# Patient Record
Sex: FEMALE | Race: WHITE | NOT HISPANIC OR LATINO | Employment: UNEMPLOYED | URBAN - METROPOLITAN AREA
[De-identification: names, ages, dates, MRNs, and addresses within clinical notes are randomized per-mention and may not be internally consistent; named-entity substitution may affect disease eponyms.]

---

## 2023-02-02 PROBLEM — R82.998 LEUKOCYTES IN URINE: Status: ACTIVE | Noted: 2023-02-02

## 2023-02-02 PROBLEM — R30.0 DYSURIA: Status: ACTIVE | Noted: 2023-02-02

## 2023-02-02 PROBLEM — R31.9 HEMATURIA: Status: ACTIVE | Noted: 2023-02-02

## 2023-02-02 PROBLEM — K63.5 COLON POLYP: Status: ACTIVE | Noted: 2023-02-02

## 2023-02-02 PROBLEM — R20.2 PARESTHESIAS: Status: ACTIVE | Noted: 2023-02-02

## 2023-02-02 PROBLEM — M76.62 ACHILLES TENDINITIS, LEFT LEG: Status: RESOLVED | Noted: 2023-02-02 | Resolved: 2023-02-02

## 2023-02-02 PROBLEM — L28.2 PRURITIC RASH: Status: ACTIVE | Noted: 2023-02-02

## 2023-02-02 PROBLEM — R10.2 ABDOMINAL PAIN, SUPRAPUBIC: Status: ACTIVE | Noted: 2023-02-02

## 2023-02-02 PROBLEM — J06.9 URI, ACUTE: Status: ACTIVE | Noted: 2023-02-02

## 2023-02-02 PROBLEM — K14.3 TONGUE PAPILLAE HYPERTROPHY: Status: ACTIVE | Noted: 2023-02-02

## 2023-02-02 PROBLEM — T81.40XA POST OP INFECTION: Status: ACTIVE | Noted: 2023-02-02

## 2023-02-02 PROBLEM — M72.2 PLANTAR FASCIITIS, BILATERAL: Status: ACTIVE | Noted: 2023-02-02

## 2023-02-02 PROBLEM — M76.62 ACHILLES TENDINITIS OF LEFT LOWER EXTREMITY: Status: ACTIVE | Noted: 2023-02-02

## 2023-02-02 PROBLEM — G56.03 BILATERAL CARPAL TUNNEL SYNDROME: Status: ACTIVE | Noted: 2023-02-02

## 2023-02-02 PROBLEM — R39.9 URINARY SYMPTOM OR SIGN: Status: ACTIVE | Noted: 2023-02-02

## 2023-02-02 PROBLEM — M79.643 HAND PAIN: Status: ACTIVE | Noted: 2023-02-02

## 2023-02-02 PROBLEM — R11.2 NAUSEA AND VOMITING: Status: ACTIVE | Noted: 2023-02-02

## 2023-02-02 PROBLEM — D25.9 UTERINE FIBROID: Status: ACTIVE | Noted: 2023-02-02

## 2023-02-02 PROBLEM — M76.62 ACHILLES BURSITIS OF LEFT LOWER EXTREMITY: Status: ACTIVE | Noted: 2023-02-02

## 2023-02-02 PROBLEM — M25.569 KNEE PAIN: Status: ACTIVE | Noted: 2023-02-02

## 2023-02-02 PROBLEM — G47.00 INSOMNIA: Status: ACTIVE | Noted: 2023-02-02

## 2023-02-02 PROBLEM — R10.2 ACUTE PAIN IN FEMALE PELVIS: Status: ACTIVE | Noted: 2023-02-02

## 2023-02-02 PROBLEM — B37.31 VAGINAL YEAST INFECTION: Status: ACTIVE | Noted: 2023-02-02

## 2023-02-02 PROBLEM — E11.9 INSULIN-REQUIRING OR DEPENDENT TYPE II DIABETES MELLITUS (MULTI): Status: ACTIVE | Noted: 2023-02-02

## 2023-02-02 PROBLEM — E78.5 HLD (HYPERLIPIDEMIA): Status: ACTIVE | Noted: 2023-02-02

## 2023-02-02 PROBLEM — D17.9 LIPOMA: Status: ACTIVE | Noted: 2023-02-02

## 2023-02-02 PROBLEM — E11.9 TYPE 2 DIABETES MELLITUS (MULTI): Status: RESOLVED | Noted: 2023-02-02 | Resolved: 2023-02-02

## 2023-02-02 PROBLEM — H66.93 ACUTE OTITIS MEDIA, BILATERAL: Status: ACTIVE | Noted: 2023-02-02

## 2023-02-02 PROBLEM — H60.93 BILATERAL OTITIS EXTERNA: Status: ACTIVE | Noted: 2023-02-02

## 2023-02-02 PROBLEM — R10.84 GENERALIZED ABDOMINAL PAIN: Status: ACTIVE | Noted: 2023-02-02

## 2023-02-02 PROBLEM — M54.9 BACK PAIN: Status: ACTIVE | Noted: 2023-02-02

## 2023-02-02 PROBLEM — B96.89 ACUTE BACTERIAL BRONCHITIS: Status: ACTIVE | Noted: 2023-02-02

## 2023-02-02 PROBLEM — F98.8 ADD (ATTENTION DEFICIT DISORDER): Status: ACTIVE | Noted: 2023-02-02

## 2023-02-02 PROBLEM — E66.01 SEVERE OBESITY (BMI 35.0-39.9) WITH COMORBIDITY (MULTI): Status: ACTIVE | Noted: 2023-02-02

## 2023-02-02 PROBLEM — R05.9 COUGH: Status: ACTIVE | Noted: 2023-02-02

## 2023-02-02 PROBLEM — K59.09 CHRONIC CONSTIPATION: Status: ACTIVE | Noted: 2023-02-02

## 2023-02-02 PROBLEM — F32.A DEPRESSION: Status: ACTIVE | Noted: 2023-02-02

## 2023-02-02 PROBLEM — J30.9 ALLERGIC RHINITIS: Status: ACTIVE | Noted: 2023-02-02

## 2023-02-02 PROBLEM — E11.9 TYPE 2 DIABETES MELLITUS (MULTI): Status: ACTIVE | Noted: 2023-02-02

## 2023-02-02 PROBLEM — R09.81 NASAL CONGESTION: Status: ACTIVE | Noted: 2023-02-02

## 2023-02-02 PROBLEM — L56.8: Status: ACTIVE | Noted: 2023-02-02

## 2023-02-02 PROBLEM — Z79.4 INSULIN-REQUIRING OR DEPENDENT TYPE II DIABETES MELLITUS (MULTI): Status: ACTIVE | Noted: 2023-02-02

## 2023-02-02 PROBLEM — K21.9 GERD WITHOUT ESOPHAGITIS: Status: ACTIVE | Noted: 2023-02-02

## 2023-02-02 PROBLEM — G89.18 POSTOPERATIVE PAIN: Status: ACTIVE | Noted: 2023-02-02

## 2023-02-02 PROBLEM — J20.8 ACUTE BACTERIAL BRONCHITIS: Status: ACTIVE | Noted: 2023-02-02

## 2023-02-02 PROBLEM — M79.672 LEFT FOOT PAIN: Status: ACTIVE | Noted: 2023-02-02

## 2023-02-02 RX ORDER — INSULIN GLARGINE 100 [IU]/ML
40 INJECTION, SOLUTION SUBCUTANEOUS EVERY MORNING
COMMUNITY
Start: 2022-06-29 | End: 2023-03-15 | Stop reason: SDUPTHER

## 2023-02-02 RX ORDER — ATORVASTATIN CALCIUM 20 MG/1
1 TABLET, FILM COATED ORAL NIGHTLY
COMMUNITY
Start: 2022-06-29 | End: 2023-03-15 | Stop reason: SDUPTHER

## 2023-02-02 RX ORDER — ALBUTEROL SULFATE 90 UG/1
1 AEROSOL, METERED RESPIRATORY (INHALATION) EVERY 4 HOURS PRN
COMMUNITY
End: 2023-03-15 | Stop reason: SDUPTHER

## 2023-02-02 RX ORDER — POLYETHYLENE GLYCOL 3350 17 G/17G
17 POWDER, FOR SOLUTION ORAL DAILY PRN
COMMUNITY
Start: 2022-10-03 | End: 2023-03-15 | Stop reason: SDUPTHER

## 2023-02-02 RX ORDER — TALC
1 POWDER (GRAM) TOPICAL NIGHTLY
COMMUNITY
Start: 2022-11-15 | End: 2023-03-15 | Stop reason: SDUPTHER

## 2023-02-02 RX ORDER — CLOTRIMAZOLE AND BETAMETHASONE DIPROPIONATE 10; .64 MG/G; MG/G
CREAM TOPICAL 2 TIMES DAILY
COMMUNITY
End: 2023-03-15 | Stop reason: SDUPTHER

## 2023-02-02 RX ORDER — BLOOD SUGAR DIAGNOSTIC
STRIP MISCELLANEOUS
COMMUNITY
Start: 2022-10-17 | End: 2023-03-15 | Stop reason: SDUPTHER

## 2023-02-02 RX ORDER — OMEPRAZOLE 40 MG/1
1 CAPSULE, DELAYED RELEASE ORAL DAILY
COMMUNITY
Start: 2019-06-10 | End: 2023-03-15 | Stop reason: SDUPTHER

## 2023-02-02 RX ORDER — FLUCONAZOLE 150 MG/1
150 TABLET ORAL ONCE
COMMUNITY
End: 2023-03-15 | Stop reason: SDUPTHER

## 2023-02-02 RX ORDER — LANCETS
EACH MISCELLANEOUS 3 TIMES DAILY
COMMUNITY
End: 2023-03-15 | Stop reason: SDUPTHER

## 2023-02-02 RX ORDER — BLOOD SUGAR DIAGNOSTIC
STRIP MISCELLANEOUS 3 TIMES DAILY
COMMUNITY
End: 2023-03-15 | Stop reason: SDUPTHER

## 2023-02-02 RX ORDER — BENZONATATE 100 MG/1
100 CAPSULE ORAL
COMMUNITY
End: 2023-03-15 | Stop reason: SDUPTHER

## 2023-02-02 RX ORDER — CEPHALEXIN 500 MG/1
500 CAPSULE ORAL 2 TIMES DAILY
COMMUNITY
End: 2023-08-29 | Stop reason: ALTCHOICE

## 2023-02-02 RX ORDER — HYDROCORTISONE 1 %
CREAM (GRAM) TOPICAL
COMMUNITY
Start: 2022-01-27 | End: 2024-03-07 | Stop reason: WASHOUT

## 2023-03-15 ENCOUNTER — OFFICE VISIT (OUTPATIENT)
Dept: PRIMARY CARE | Facility: CLINIC | Age: 38
End: 2023-03-15
Payer: MEDICARE

## 2023-03-15 VITALS
HEIGHT: 66 IN | HEART RATE: 95 BPM | DIASTOLIC BLOOD PRESSURE: 83 MMHG | TEMPERATURE: 97.1 F | WEIGHT: 247 LBS | SYSTOLIC BLOOD PRESSURE: 116 MMHG | BODY MASS INDEX: 39.7 KG/M2 | OXYGEN SATURATION: 98 %

## 2023-03-15 DIAGNOSIS — Z79.4 INSULIN-REQUIRING OR DEPENDENT TYPE II DIABETES MELLITUS (MULTI): Primary | ICD-10-CM

## 2023-03-15 DIAGNOSIS — E78.5 HYPERLIPIDEMIA, UNSPECIFIED HYPERLIPIDEMIA TYPE: ICD-10-CM

## 2023-03-15 DIAGNOSIS — E66.01 OBESITY, CLASS III, BMI 40-49.9 (MORBID OBESITY) (MULTI): ICD-10-CM

## 2023-03-15 DIAGNOSIS — E11.9 INSULIN-REQUIRING OR DEPENDENT TYPE II DIABETES MELLITUS (MULTI): Primary | ICD-10-CM

## 2023-03-15 DIAGNOSIS — R20.2 PARESTHESIAS: ICD-10-CM

## 2023-03-15 DIAGNOSIS — F90.8 ATTENTION DEFICIT HYPERACTIVITY DISORDER (ADHD), OTHER TYPE: ICD-10-CM

## 2023-03-15 DIAGNOSIS — K59.03 DRUG-INDUCED CONSTIPATION: ICD-10-CM

## 2023-03-15 DIAGNOSIS — K21.9 GERD WITHOUT ESOPHAGITIS: ICD-10-CM

## 2023-03-15 DIAGNOSIS — J45.909 ASTHMA, UNSPECIFIED ASTHMA SEVERITY, UNSPECIFIED WHETHER COMPLICATED, UNSPECIFIED WHETHER PERSISTENT (HHS-HCC): ICD-10-CM

## 2023-03-15 DIAGNOSIS — F32.A DEPRESSION, UNSPECIFIED DEPRESSION TYPE: ICD-10-CM

## 2023-03-15 PROBLEM — G56.03 BILATERAL CARPAL TUNNEL SYNDROME: Status: RESOLVED | Noted: 2023-02-02 | Resolved: 2023-03-15

## 2023-03-15 PROBLEM — E66.813 OBESITY, CLASS III, BMI 40-49.9 (MORBID OBESITY): Status: ACTIVE | Noted: 2023-03-15

## 2023-03-15 PROCEDURE — 99215 OFFICE O/P EST HI 40 MIN: CPT | Performed by: INTERNAL MEDICINE

## 2023-03-15 PROCEDURE — 3079F DIAST BP 80-89 MM HG: CPT | Performed by: INTERNAL MEDICINE

## 2023-03-15 PROCEDURE — 3074F SYST BP LT 130 MM HG: CPT | Performed by: INTERNAL MEDICINE

## 2023-03-15 RX ORDER — TALC
3 POWDER (GRAM) TOPICAL NIGHTLY
Qty: 90 TABLET | Refills: 3 | Status: SHIPPED | OUTPATIENT
Start: 2023-03-15 | End: 2023-08-29 | Stop reason: ALTCHOICE

## 2023-03-15 RX ORDER — INSULIN GLARGINE 100 [IU]/ML
40 INJECTION, SOLUTION SUBCUTANEOUS EVERY MORNING
Qty: 3 ML | Refills: 11 | Status: SHIPPED | OUTPATIENT
Start: 2023-03-15 | End: 2023-08-01

## 2023-03-15 RX ORDER — ATORVASTATIN CALCIUM 20 MG/1
20 TABLET, FILM COATED ORAL NIGHTLY
Qty: 90 TABLET | Refills: 3 | Status: SHIPPED | OUTPATIENT
Start: 2023-03-15 | End: 2023-08-29 | Stop reason: SDDI

## 2023-03-15 RX ORDER — POLYETHYLENE GLYCOL 3350 17 G/17G
17 POWDER, FOR SOLUTION ORAL DAILY PRN
Qty: 540 G | Refills: 11 | Status: SHIPPED | OUTPATIENT
Start: 2023-03-15 | End: 2023-08-29 | Stop reason: ALTCHOICE

## 2023-03-15 RX ORDER — ACETAMINOPHEN 500 MG
1000 TABLET ORAL EVERY 6 HOURS
COMMUNITY

## 2023-03-15 RX ORDER — FLUCONAZOLE 150 MG/1
150 TABLET ORAL ONCE
Qty: 1 TABLET | Refills: 0 | Status: SHIPPED | OUTPATIENT
Start: 2023-03-15 | End: 2023-03-15

## 2023-03-15 RX ORDER — MICONAZOLE NITRATE 2 %
CREAM WITH APPLICATOR VAGINAL
Qty: 45 G | Refills: 3 | Status: SHIPPED | OUTPATIENT
Start: 2023-03-15 | End: 2023-08-29 | Stop reason: ALTCHOICE

## 2023-03-15 RX ORDER — LANCETS 33 GAUGE
EACH MISCELLANEOUS
COMMUNITY
Start: 2022-11-03 | End: 2024-03-07 | Stop reason: SDUPTHER

## 2023-03-15 RX ORDER — OMEPRAZOLE 40 MG/1
40 CAPSULE, DELAYED RELEASE ORAL
Qty: 90 CAPSULE | Refills: 3 | Status: SHIPPED | OUTPATIENT
Start: 2023-03-15 | End: 2024-03-14

## 2023-03-15 RX ORDER — LANCETS
1 EACH MISCELLANEOUS 3 TIMES DAILY
Qty: 90 EACH | Refills: 3 | Status: SHIPPED | OUTPATIENT
Start: 2023-03-15

## 2023-03-15 RX ORDER — MICONAZOLE NITRATE 2 %
CREAM WITH APPLICATOR VAGINAL
COMMUNITY
Start: 2023-02-21 | End: 2023-03-15 | Stop reason: SDUPTHER

## 2023-03-15 RX ORDER — ALBUTEROL SULFATE 90 UG/1
1 AEROSOL, METERED RESPIRATORY (INHALATION) EVERY 4 HOURS PRN
Qty: 18 G | Refills: 3 | Status: SHIPPED | OUTPATIENT
Start: 2023-03-15 | End: 2023-08-29 | Stop reason: SDDI

## 2023-03-15 RX ORDER — KETOROLAC TROMETHAMINE 10 MG/1
TABLET, FILM COATED ORAL
COMMUNITY
Start: 2022-10-27 | End: 2023-11-02 | Stop reason: ALTCHOICE

## 2023-03-15 RX ORDER — BENZONATATE 100 MG/1
100 CAPSULE ORAL 3 TIMES DAILY PRN
Qty: 20 CAPSULE | Refills: 3 | Status: SHIPPED | OUTPATIENT
Start: 2023-03-15 | End: 2023-08-29 | Stop reason: ALTCHOICE

## 2023-03-15 RX ORDER — CLOTRIMAZOLE AND BETAMETHASONE DIPROPIONATE 10; .64 MG/G; MG/G
CREAM TOPICAL 2 TIMES DAILY
Qty: 60 G | Refills: 3 | Status: SHIPPED | OUTPATIENT
Start: 2023-03-15 | End: 2023-11-02 | Stop reason: SDUPTHER

## 2023-03-15 RX ORDER — BLOOD SUGAR DIAGNOSTIC
STRIP MISCELLANEOUS
Qty: 100 STRIP | Refills: 11 | Status: SHIPPED | OUTPATIENT
Start: 2023-03-15 | End: 2024-03-07 | Stop reason: SDUPTHER

## 2023-03-15 SDOH — ECONOMIC STABILITY: FOOD INSECURITY: WITHIN THE PAST 12 MONTHS, YOU WORRIED THAT YOUR FOOD WOULD RUN OUT BEFORE YOU GOT MONEY TO BUY MORE.: SOMETIMES TRUE

## 2023-03-15 SDOH — ECONOMIC STABILITY: FOOD INSECURITY: WITHIN THE PAST 12 MONTHS, THE FOOD YOU BOUGHT JUST DIDN'T LAST AND YOU DIDN'T HAVE MONEY TO GET MORE.: SOMETIMES TRUE

## 2023-03-15 ASSESSMENT — ENCOUNTER SYMPTOMS
DEPRESSION: 0
LOSS OF SENSATION IN FEET: 1
OCCASIONAL FEELINGS OF UNSTEADINESS: 1

## 2023-03-15 ASSESSMENT — LIFESTYLE VARIABLES: HOW MANY STANDARD DRINKS CONTAINING ALCOHOL DO YOU HAVE ON A TYPICAL DAY: PATIENT DOES NOT DRINK

## 2023-03-15 ASSESSMENT — PATIENT HEALTH QUESTIONNAIRE - PHQ9
1. LITTLE INTEREST OR PLEASURE IN DOING THINGS: NOT AT ALL
10. IF YOU CHECKED OFF ANY PROBLEMS, HOW DIFFICULT HAVE THESE PROBLEMS MADE IT FOR YOU TO DO YOUR WORK, TAKE CARE OF THINGS AT HOME, OR GET ALONG WITH OTHER PEOPLE: NOT DIFFICULT AT ALL
SUM OF ALL RESPONSES TO PHQ9 QUESTIONS 1 & 2: 2
2. FEELING DOWN, DEPRESSED OR HOPELESS: MORE THAN HALF THE DAYS

## 2023-03-15 NOTE — PROGRESS NOTES
Chief Complaint/HPI:  DM type 2: patient takes Lantus and Januvia. Glucoses fluctuate, she is aware of this. Patient had surgery last fall, had hysterectomy,  has ovaries, she does get intermittent sharp pain    Hyperlipidemia: patient takes atorvastatin, last labs were checked in 11/2022     Patient feels tired at times, gets numbness in the hands and feet.  Hands get numb at times    Panic attack: patient gets shortness of breath when she has panic attacks      ROS otherwise negative aside from what was mentioned above in HPI.      Patient Active Problem List   Diagnosis    Abdominal pain, suprapubic    Achilles bursitis of left lower extremity    Achilles tendinitis of left lower extremity    Acute bacterial bronchitis    Acute otitis media, bilateral    Acute pain in female pelvis    ADD (attention deficit disorder)    Allergic rhinitis    Back pain    Bilateral otitis externa    Chronic constipation    Colon polyp    Cough    Depression    Dermatitis, photoallergic    Dysuria    Generalized abdominal pain    GERD without esophagitis    Hand pain    Hematuria    HLD (hyperlipidemia)    Insomnia    Insulin-requiring or dependent type II diabetes mellitus (CMS/HCC)    Knee pain    Left foot pain    Leukocytes in urine    Lipoma    Nasal congestion    Nausea and vomiting    Paresthesias    Plantar fasciitis, bilateral    Post op infection    Postoperative pain    Pruritic rash    Tongue papillae hypertrophy    Type 2 diabetes mellitus (CMS/HCC)    URI, acute    Urinary symptom or sign    Uterine fibroid    Vaginal yeast infection    Obesity, Class III, BMI 40-49.9 (morbid obesity) (CMS/HCC)         Past Medical History:   Diagnosis Date    Achilles tendinitis, left leg 02/02/2023    Encounter for screening for malignant neoplasm of colon     Encounter for colonoscopy in patient with family history of colon cancer    Personal history of other diseases of the digestive system     History of acute pancreatitis     Personal history of other endocrine, nutritional and metabolic disease     History of type 1 diabetes mellitus    Type 2 diabetes mellitus (CMS/[facility]) 02/02/2023     Past Surgical History:   Procedure Laterality Date    OTHER SURGICAL HISTORY  06/29/2022    No history of surgery     Social History     Social History Narrative    Not on file         ALLERGIES  Morphine and Wool      MEDICATIONS  Current Outpatient Medications on File Prior to Visit   Medication Sig Dispense Refill    cephalexin (Keflex) 500 mg capsule Take 1 capsule (500 mg) by mouth in the morning and 1 capsule (500 mg) before bedtime. After meals.      flash glucose sensor (FREESTYLE KAMALJIT 2 SENSOR [facility])       hydrocortisone 1 % cream Hydrocortisone 1 % External Cream   Quantity: 28  Refills: 0        Start : 27-Jan-2022   Active      OneTouch Delica Plus Lancet 33 gauge misc       SITagliptin phosphate (Januvia) 100 mg tablet Take 1 tablet (100 mg) by mouth once daily.      [DISCONTINUED] albuterol 90 mcg/actuation inhaler Inhale 1 puff every 4 hours if needed.      [DISCONTINUED] atorvastatin (Lipitor) 20 mg tablet Take 1 tablet (20 mg) by mouth once daily at bedtime.      [DISCONTINUED] benzonatate (Tessalon) 100 mg capsule Take 1 capsule (100 mg) by mouth. Do not crush or chew. 2-3 times daily as directed      [DISCONTINUED] blood sugar diagnostic (Accu-Chek Guide test strips) strip TEST 3 TIMES DAILY.      [DISCONTINUED] blood sugar diagnostic (Accu-Chek Guide test strips) strip in the morning, at noon, and at bedtime.      [DISCONTINUED] clotrimazole-betamethasone (Lotrisone) cream in the morning and at bedtime. Apply and rub in a thin film to affected areas      [DISCONTINUED] fluconazole (Diflucan) 150 mg tablet Take 1 tablet (150 mg) by mouth 1 time. May repeat in 1 week if still symptomatic      [DISCONTINUED] insulin glargine (Lantus) 100 unit/mL (3 mL) pen Inject 40 Units under the skin once daily in the morning.      [DISCONTINUED]  "lancets misc in the morning, at noon, and at bedtime. As directed      [DISCONTINUED] melatonin 3 mg tablet Take 1 tablet (3 mg) by mouth once daily at bedtime.      [DISCONTINUED] miconazole (Micotin) 2 % vaginal cream APPLY INTRAVAGINALLY AT BEDTIME NIGHTLY.      [DISCONTINUED] omeprazole (PriLOSEC) 40 mg DR capsule Take 1 capsule (40 mg) by mouth once daily.      [DISCONTINUED] polyethylene glycol (Glycolax) 17 gram/dose powder Take 17 g by mouth once daily as needed (constipation). In liquid      acetaminophen (Tylenol) 500 mg tablet Take 2 tablets (1,000 mg) by mouth in the morning and 2 tablets (1,000 mg) at noon and 2 tablets (1,000 mg) in the evening and 2 tablets (1,000 mg) before bedtime.      elderberry fruit 50 mg/5 mL syrup 1   once a day      ketorolac (Toradol) 10 mg tablet        No current facility-administered medications on file prior to visit.         PHYSICAL EXAM  /83 (BP Location: Right arm, Patient Position: Sitting, BP Cuff Size: Large adult)   Pulse 95   Temp 36.2 °C (97.1 °F)   Ht 1.664 m (5' 5.5\")   Wt 112 kg (247 lb)   SpO2 98%   BMI 40.48 kg/m²   Body mass index is 40.48 kg/m².  Gen: Alert, NAD, she is morbidly obese, wearing a mask, accompanied by her dog  HEENT:  PERRLA, EOMI, conjunctiva and sclera normal in appearance  Respiratory:  Lungs CTAB  Cardiovascular:  Heart RRR. No M/R/G, difficulty hearing, due to barking dog  Neuro:  Gross motor and sensory intact, subjective numbness of the hands is noted      ASSESSMENT/PLAN  Problem List Items Addressed This Visit          Nervous    Paresthesias    Current Assessment & Plan     Patient has numbness in the hands, check NCTs of the hands will be ordered, check routine labs also          Relevant Medications    albuterol 90 mcg/actuation inhaler    benzonatate (Tessalon) 100 mg capsule    clotrimazole-betamethasone (Lotrisone) cream    fluconazole (Diflucan) 150 mg tablet    miconazole (Micotin) 2 % vaginal cream    " omeprazole (PriLOSEC) 40 mg DR capsule       Digestive    GERD without esophagitis       Endocrine/Metabolic    Insulin-requiring or dependent type II diabetes mellitus (CMS/HCC) - Primary    Current Assessment & Plan     Glucoses do fluctuate, she takes Januvia now. Continue the insulin therapy, trial of Ozempic to see if this promotes weight loss and improved glucose control, stop the Januvia when starting Ozempic, recheck labs         Relevant Medications    albuterol 90 mcg/actuation inhaler    benzonatate (Tessalon) 100 mg capsule    blood sugar diagnostic (Accu-Chek Guide test strips) strip    clotrimazole-betamethasone (Lotrisone) cream    fluconazole (Diflucan) 150 mg tablet    insulin glargine (Lantus) 100 unit/mL (3 mL) pen    lancets misc    miconazole (Micotin) 2 % vaginal cream    omeprazole (PriLOSEC) 40 mg DR capsule    semaglutide (Ozempic) injection 0.25 mg (Start on 3/15/2023  6:45 PM)    Obesity, Class III, BMI 40-49.9 (morbid obesity) (CMS/[facility])    Current Assessment & Plan     See if Ozempic causes any weight loss, will stop the Januvia         Relevant Medications    albuterol 90 mcg/actuation inhaler    benzonatate (Tessalon) 100 mg capsule    clotrimazole-betamethasone (Lotrisone) cream    fluconazole (Diflucan) 150 mg tablet    miconazole (Micotin) 2 % vaginal cream    omeprazole (PriLOSEC) 40 mg DR capsule    semaglutide (Ozempic) injection 0.25 mg (Start on 3/15/2023  6:45 PM)       Other    ADD (attention deficit disorder)    Relevant Medications    albuterol 90 mcg/actuation inhaler    benzonatate (Tessalon) 100 mg capsule    clotrimazole-betamethasone (Lotrisone) cream    fluconazole (Diflucan) 150 mg tablet    miconazole (Micotin) 2 % vaginal cream    omeprazole (PriLOSEC) 40 mg DR capsule    Depression    Relevant Medications    albuterol 90 mcg/actuation inhaler    benzonatate (Tessalon) 100 mg capsule    clotrimazole-betamethasone (Lotrisone) cream    fluconazole (Diflucan) 150 mg  tablet    melatonin 3 mg tablet    miconazole (Micotin) 2 % vaginal cream    omeprazole (PriLOSEC) 40 mg DR capsule    HLD (hyperlipidemia)    Current Assessment & Plan     Patient takes atorvastatin, check labs         Relevant Medications    albuterol 90 mcg/actuation inhaler    atorvastatin (Lipitor) 20 mg tablet    benzonatate (Tessalon) 100 mg capsule    clotrimazole-betamethasone (Lotrisone) cream    fluconazole (Diflucan) 150 mg tablet    miconazole (Micotin) 2 % vaginal cream    omeprazole (PriLOSEC) 40 mg DR capsule     Other Visit Diagnoses       Asthma, unspecified asthma severity, unspecified whether complicated, unspecified whether persistent        Relevant Medications    albuterol 90 mcg/actuation inhaler    benzonatate (Tessalon) 100 mg capsule    clotrimazole-betamethasone (Lotrisone) cream    fluconazole (Diflucan) 150 mg tablet    miconazole (Micotin) 2 % vaginal cream    omeprazole (PriLOSEC) 40 mg DR capsule    Drug-induced constipation        Relevant Medications    miconazole (Micotin) 2 % vaginal cream    omeprazole (PriLOSEC) 40 mg DR capsule    polyethylene glycol (Glycolax) 17 gram/dose powder          Wheezing: encourage the patient to quit smoking, meds refilled  Hany Sabillon MD

## 2023-03-15 NOTE — ASSESSMENT & PLAN NOTE
Glucoses do fluctuate, she takes Januvia now. Continue the insulin therapy, trial of Ozempic to see if this promotes weight loss and improved glucose control, stop the Januvia when starting Ozempic, recheck labs

## 2023-03-15 NOTE — ASSESSMENT & PLAN NOTE
Patient has numbness in the hands, check NCTs of the hands will be ordered, check routine labs also

## 2023-03-21 ENCOUNTER — TELEPHONE (OUTPATIENT)
Dept: PRIMARY CARE | Facility: CLINIC | Age: 38
End: 2023-03-21
Payer: MEDICAID

## 2023-03-21 NOTE — TELEPHONE ENCOUNTER
Prior authorization request received via fax for [employer] LANCETS    Form given to: PLACED IN LEAD MA'S BOX ON 3/21.

## 2023-03-21 NOTE — TELEPHONE ENCOUNTER
Pt is needing a letter for work accommodations to say that she can take a break daily in order to check her sugar to make sure it isn't high or low.

## 2023-05-13 ENCOUNTER — HOSPITAL ENCOUNTER (OUTPATIENT)
Dept: DATA CONVERSION | Facility: HOSPITAL | Age: 38
End: 2023-05-13

## 2023-06-14 ENCOUNTER — TELEPHONE (OUTPATIENT)
Dept: PRIMARY CARE | Facility: CLINIC | Age: 38
End: 2023-06-14
Payer: MEDICAID

## 2023-06-14 DIAGNOSIS — B37.31 VAGINAL YEAST INFECTION: Primary | ICD-10-CM

## 2023-06-14 RX ORDER — FLUCONAZOLE 150 MG/1
150 TABLET ORAL SEE ADMIN INSTRUCTIONS
Qty: 2 TABLET | Refills: 1 | Status: SHIPPED | OUTPATIENT
Start: 2023-06-14 | End: 2023-07-24 | Stop reason: SDUPTHER

## 2023-07-17 ENCOUNTER — APPOINTMENT (OUTPATIENT)
Dept: PRIMARY CARE | Facility: CLINIC | Age: 38
End: 2023-07-17
Payer: MEDICARE

## 2023-07-17 DIAGNOSIS — Z79.4 INSULIN-REQUIRING OR DEPENDENT TYPE II DIABETES MELLITUS (MULTI): Primary | ICD-10-CM

## 2023-07-17 DIAGNOSIS — E11.9 INSULIN-REQUIRING OR DEPENDENT TYPE II DIABETES MELLITUS (MULTI): Primary | ICD-10-CM

## 2023-07-17 RX ORDER — SITAGLIPTIN 100 MG/1
TABLET, FILM COATED ORAL
Qty: 90 TABLET | Refills: 3 | Status: SHIPPED | OUTPATIENT
Start: 2023-07-17

## 2023-07-17 NOTE — TELEPHONE ENCOUNTER
Pt called to check on her appt. (Today 07/17/23 @ 3:00pm)  Her phone number changed and didn't notify us so her appt was canceled.  Pt was to be check for her A1C.  Dr Sabillon next available appt in August.  She wanted to make sure she is ok to wait that long for her A1C appt. Thanks:)

## 2023-07-20 ENCOUNTER — CLINICAL SUPPORT (OUTPATIENT)
Dept: PRIMARY CARE | Facility: CLINIC | Age: 38
End: 2023-07-20
Payer: MEDICARE

## 2023-07-20 DIAGNOSIS — B37.31 VAGINAL YEAST INFECTION: ICD-10-CM

## 2023-07-20 DIAGNOSIS — N39.0 ACUTE UTI: ICD-10-CM

## 2023-07-20 DIAGNOSIS — R82.998 LEUKOCYTES IN URINE: Primary | ICD-10-CM

## 2023-07-20 LAB
POC APPEARANCE, URINE: ABNORMAL
POC BILIRUBIN, URINE: ABNORMAL
POC BLOOD, URINE: ABNORMAL
POC COLOR, URINE: ABNORMAL
POC GLUCOSE, URINE: ABNORMAL MG/DL
POC KETONES, URINE: ABNORMAL MG/DL
POC LEUKOCYTES, URINE: ABNORMAL
POC NITRITE,URINE: POSITIVE
POC PH, URINE: 6 PH
POC PROTEIN, URINE: ABNORMAL MG/DL
POC SPECIFIC GRAVITY, URINE: >=1.03
POC UROBILINOGEN, URINE: 1 EU/DL

## 2023-07-20 PROCEDURE — 87186 SC STD MICRODIL/AGAR DIL: CPT

## 2023-07-20 PROCEDURE — 87086 URINE CULTURE/COLONY COUNT: CPT

## 2023-07-20 PROCEDURE — 81002 URINALYSIS NONAUTO W/O SCOPE: CPT | Performed by: INTERNAL MEDICINE

## 2023-07-20 PROCEDURE — 99211 OFF/OP EST MAY X REQ PHY/QHP: CPT | Performed by: INTERNAL MEDICINE

## 2023-07-20 PROCEDURE — 87077 CULTURE AEROBIC IDENTIFY: CPT

## 2023-07-20 NOTE — PROGRESS NOTES
Patient came in stating she is a diabetic and gets yeast infections all the time. I had her leave a urine sample just in case and it does look like it is positive for infection. Please send in antibiotic and something for yeast she uses [facility] in [place]

## 2023-07-23 LAB — URINE CULTURE: ABNORMAL

## 2023-07-24 RX ORDER — SULFAMETHOXAZOLE AND TRIMETHOPRIM 800; 160 MG/1; MG/1
1 TABLET ORAL 2 TIMES DAILY
Qty: 14 TABLET | Refills: 0 | Status: SHIPPED | OUTPATIENT
Start: 2023-07-24 | End: 2023-07-31

## 2023-07-24 RX ORDER — FLUCONAZOLE 150 MG/1
150 TABLET ORAL SEE ADMIN INSTRUCTIONS
Qty: 2 TABLET | Refills: 1 | Status: SHIPPED | OUTPATIENT
Start: 2023-07-24 | End: 2023-08-29 | Stop reason: ALTCHOICE

## 2023-07-31 DIAGNOSIS — Z79.4 INSULIN-REQUIRING OR DEPENDENT TYPE II DIABETES MELLITUS (MULTI): ICD-10-CM

## 2023-07-31 DIAGNOSIS — E11.9 INSULIN-REQUIRING OR DEPENDENT TYPE II DIABETES MELLITUS (MULTI): ICD-10-CM

## 2023-08-01 DIAGNOSIS — E11.9 INSULIN-REQUIRING OR DEPENDENT TYPE II DIABETES MELLITUS (MULTI): ICD-10-CM

## 2023-08-01 DIAGNOSIS — Z79.4 INSULIN-REQUIRING OR DEPENDENT TYPE II DIABETES MELLITUS (MULTI): ICD-10-CM

## 2023-08-01 PROBLEM — K76.0 FATTY LIVER: Status: ACTIVE | Noted: 2021-01-05

## 2023-08-01 PROBLEM — H52.4 MYOPIA WITH ASTIGMATISM AND PRESBYOPIA, BILATERAL: Status: ACTIVE | Noted: 2021-03-09

## 2023-08-01 PROBLEM — B37.9 YEAST INFECTION: Status: ACTIVE | Noted: 2021-12-25

## 2023-08-01 PROBLEM — K21.9 GERD (GASTROESOPHAGEAL REFLUX DISEASE): Status: ACTIVE | Noted: 2020-10-23

## 2023-08-01 PROBLEM — G43.909 MIGRAINES: Status: ACTIVE | Noted: 2021-01-05

## 2023-08-01 PROBLEM — R19.7 DIARRHEA: Status: ACTIVE | Noted: 2021-12-25

## 2023-08-01 PROBLEM — Z86.19 HISTORY OF HELICOBACTER PYLORI INFECTION: Status: ACTIVE | Noted: 2021-01-05

## 2023-08-01 PROBLEM — K64.8 INTERNAL HEMORRHOIDS: Status: ACTIVE | Noted: 2021-01-05

## 2023-08-01 PROBLEM — M79.7 FIBROMYALGIA: Status: ACTIVE | Noted: 2021-01-18

## 2023-08-01 PROBLEM — R21 RASH: Status: ACTIVE | Noted: 2023-08-01

## 2023-08-01 PROBLEM — Z86.19 HISTORY OF HERPES GENITALIS: Status: ACTIVE | Noted: 2021-01-05

## 2023-08-01 PROBLEM — E78.49 OTHER HYPERLIPIDEMIA: Status: ACTIVE | Noted: 2022-03-09

## 2023-08-01 PROBLEM — N93.9: Status: ACTIVE | Noted: 2021-11-02

## 2023-08-01 PROBLEM — L23.9 ALLERGIC DERMATITIS: Status: ACTIVE | Noted: 2023-08-01

## 2023-08-01 PROBLEM — F17.200 TOBACCO USE DISORDER: Status: ACTIVE | Noted: 2021-11-02

## 2023-08-01 PROBLEM — H52.13 MYOPIA WITH ASTIGMATISM AND PRESBYOPIA, BILATERAL: Status: ACTIVE | Noted: 2021-03-09

## 2023-08-01 PROBLEM — H52.203 MYOPIA WITH ASTIGMATISM AND PRESBYOPIA, BILATERAL: Status: ACTIVE | Noted: 2021-03-09

## 2023-08-01 PROBLEM — H66.92 ACUTE OTITIS MEDIA, LEFT: Status: ACTIVE | Noted: 2017-07-03

## 2023-08-01 PROBLEM — D25.2: Status: ACTIVE | Noted: 2021-11-02

## 2023-08-01 PROBLEM — L03.90 CELLULITIS: Status: ACTIVE | Noted: 2023-08-01

## 2023-08-01 PROBLEM — F43.10 PTSD (POST-TRAUMATIC STRESS DISORDER): Status: ACTIVE | Noted: 2021-01-05

## 2023-08-01 PROBLEM — G47.30 SLEEP APNEA: Status: ACTIVE | Noted: 2021-11-02

## 2023-08-01 PROBLEM — D25.2 FIBROIDS, SUBSEROUS: Status: ACTIVE | Noted: 2021-11-02
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
## 2023-08-01 PROBLEM — D21.9 MYOMA: Status: ACTIVE | Noted: 2021-05-13

## 2023-08-01 RX ORDER — INSULIN GLARGINE 100 [IU]/ML
INJECTION, SOLUTION SUBCUTANEOUS
Qty: 21 ML | Refills: 3 | Status: SHIPPED | OUTPATIENT
Start: 2023-08-01 | End: 2023-08-01 | Stop reason: SDUPTHER

## 2023-08-01 RX ORDER — INSULIN GLARGINE 100 [IU]/ML
INJECTION, SOLUTION SUBCUTANEOUS
Qty: 21 ML | Refills: 3 | Status: SHIPPED | OUTPATIENT
Start: 2023-08-01 | End: 2024-01-13 | Stop reason: SDUPTHER

## 2023-08-01 RX ORDER — ASPIRIN 325 MG
1 TABLET, DELAYED RELEASE (ENTERIC COATED) ORAL EVERY EVENING
COMMUNITY
Start: 2023-07-25 | End: 2024-03-07 | Stop reason: WASHOUT

## 2023-08-01 RX ORDER — AZELASTINE 1 MG/ML
2 SPRAY, METERED NASAL
COMMUNITY
Start: 2021-12-14

## 2023-08-01 RX ORDER — PANTOPRAZOLE SODIUM 40 MG/1
1 TABLET, DELAYED RELEASE ORAL 2 TIMES DAILY
COMMUNITY
Start: 2020-12-09 | End: 2023-11-02 | Stop reason: ALTCHOICE

## 2023-08-01 RX ORDER — PREDNISONE 50 MG/1
50 TABLET ORAL DAILY
COMMUNITY
Start: 2023-07-28 | End: 2023-11-02 | Stop reason: ALTCHOICE

## 2023-08-01 RX ORDER — DIPHENHYDRAMINE HCL 25 MG
25 TABLET ORAL EVERY 6 HOURS PRN
COMMUNITY
Start: 2023-07-26

## 2023-08-01 RX ORDER — IBUPROFEN 800 MG/1
800 TABLET ORAL
COMMUNITY
Start: 2021-08-20

## 2023-08-01 RX ORDER — PREDNISONE 20 MG/1
20 TABLET ORAL DAILY
COMMUNITY
Start: 2023-07-26 | End: 2023-11-02 | Stop reason: ALTCHOICE

## 2023-08-01 RX ORDER — FAMOTIDINE 20 MG/1
1 TABLET, FILM COATED ORAL DAILY
COMMUNITY
Start: 2023-07-26 | End: 2023-11-02 | Stop reason: ALTCHOICE

## 2023-08-02 ENCOUNTER — TELEPHONE (OUTPATIENT)
Dept: PRIMARY CARE | Facility: CLINIC | Age: 38
End: 2023-08-02
Payer: MEDICAID

## 2023-08-02 DIAGNOSIS — E78.5 HYPERLIPIDEMIA, UNSPECIFIED HYPERLIPIDEMIA TYPE: ICD-10-CM

## 2023-08-02 DIAGNOSIS — Z79.4 INSULIN-REQUIRING OR DEPENDENT TYPE II DIABETES MELLITUS (MULTI): Primary | ICD-10-CM

## 2023-08-02 DIAGNOSIS — E11.9 INSULIN-REQUIRING OR DEPENDENT TYPE II DIABETES MELLITUS (MULTI): Primary | ICD-10-CM

## 2023-08-02 NOTE — TELEPHONE ENCOUNTER
Pt called in and is requesting a note be written stating that the pt is able to work while having diabetes and that it is under control. Pt has a potential job offer and will be needing the note. Pt would like a call once this is completed.

## 2023-08-29 ENCOUNTER — OFFICE VISIT (OUTPATIENT)
Dept: PRIMARY CARE | Facility: CLINIC | Age: 38
End: 2023-08-29
Payer: MEDICARE

## 2023-08-29 VITALS
RESPIRATION RATE: 16 BRPM | SYSTOLIC BLOOD PRESSURE: 103 MMHG | TEMPERATURE: 97.7 F | HEIGHT: 66 IN | DIASTOLIC BLOOD PRESSURE: 66 MMHG | BODY MASS INDEX: 38.09 KG/M2 | OXYGEN SATURATION: 97 % | HEART RATE: 107 BPM | WEIGHT: 237 LBS

## 2023-08-29 DIAGNOSIS — E66.01 OBESITY, CLASS III, BMI 40-49.9 (MORBID OBESITY) (MULTI): ICD-10-CM

## 2023-08-29 DIAGNOSIS — E11.9 INSULIN-REQUIRING OR DEPENDENT TYPE II DIABETES MELLITUS (MULTI): ICD-10-CM

## 2023-08-29 DIAGNOSIS — Z91.148 NONCOMPLIANCE WITH MEDICATION REGIMEN: Primary | ICD-10-CM

## 2023-08-29 DIAGNOSIS — Z79.4 INSULIN-REQUIRING OR DEPENDENT TYPE II DIABETES MELLITUS (MULTI): ICD-10-CM

## 2023-08-29 DIAGNOSIS — K76.0 FATTY LIVER: ICD-10-CM

## 2023-08-29 DIAGNOSIS — F17.200 TOBACCO DEPENDENCE SYNDROME: ICD-10-CM

## 2023-08-29 DIAGNOSIS — L02.91 ABSCESS: ICD-10-CM

## 2023-08-29 DIAGNOSIS — E78.49 OTHER HYPERLIPIDEMIA: ICD-10-CM

## 2023-08-29 PROBLEM — S46.219A RUPTURE OF BICEPS TENDON: Status: ACTIVE | Noted: 2023-08-29

## 2023-08-29 PROBLEM — G47.19 EXCESSIVE DAYTIME SLEEPINESS: Status: ACTIVE | Noted: 2023-08-29

## 2023-08-29 PROBLEM — N39.0 ACUTE LOWER URINARY TRACT INFECTION: Status: ACTIVE | Noted: 2023-08-29

## 2023-08-29 PROBLEM — R06.00 DYSPNEA: Status: ACTIVE | Noted: 2023-08-29

## 2023-08-29 PROBLEM — D72.829 LEUKOCYTOSIS: Status: ACTIVE | Noted: 2023-08-29

## 2023-08-29 PROBLEM — F60.3 BORDERLINE PERSONALITY DISORDER (MULTI): Status: ACTIVE | Noted: 2023-08-29

## 2023-08-29 PROBLEM — F33.9 RECURRENT MAJOR DEPRESSIVE DISORDER (CMS-HCC): Status: ACTIVE | Noted: 2023-08-29

## 2023-08-29 PROBLEM — K62.5 RECTAL HEMORRHAGE: Status: ACTIVE | Noted: 2023-08-29

## 2023-08-29 PROBLEM — R53.82 CHRONIC FATIGUE: Status: ACTIVE | Noted: 2023-08-29

## 2023-08-29 PROCEDURE — 99214 OFFICE O/P EST MOD 30 MIN: CPT | Performed by: INTERNAL MEDICINE

## 2023-08-29 PROCEDURE — 3074F SYST BP LT 130 MM HG: CPT | Performed by: INTERNAL MEDICINE

## 2023-08-29 PROCEDURE — 3078F DIAST BP <80 MM HG: CPT | Performed by: INTERNAL MEDICINE

## 2023-08-29 RX ORDER — CHLORHEXIDINE GLUCONATE 40 MG/ML
SOLUTION TOPICAL DAILY PRN
Qty: 473 ML | Refills: 1 | Status: SHIPPED | OUTPATIENT
Start: 2023-08-29 | End: 2024-03-07 | Stop reason: WASHOUT

## 2023-08-29 RX ORDER — SULFAMETHOXAZOLE AND TRIMETHOPRIM 800; 160 MG/1; MG/1
1 TABLET ORAL 2 TIMES DAILY
Qty: 28 TABLET | Refills: 0 | Status: SHIPPED | OUTPATIENT
Start: 2023-08-29 | End: 2023-09-12

## 2023-08-29 RX ORDER — MUPIROCIN 20 MG/G
OINTMENT TOPICAL 3 TIMES DAILY
Qty: 22 G | Refills: 0 | Status: SHIPPED | OUTPATIENT
Start: 2023-08-29 | End: 2023-09-08

## 2023-08-29 RX ORDER — CAMPHOR/EUCALYPTUS/MENTHOL
2 LIQUID (ML) MISCELLANEOUS 4 TIMES DAILY PRN
Qty: 250 EACH | Refills: 3 | Status: SHIPPED | OUTPATIENT
Start: 2023-08-29 | End: 2024-03-07 | Stop reason: WASHOUT

## 2023-08-29 ASSESSMENT — ENCOUNTER SYMPTOMS
FATIGUE: 1
SORE THROAT: 0
ANOREXIA: 0
FEVER: 0
EYE PAIN: 1
SHORTNESS OF BREATH: 0
VOMITING: 0
RHINORRHEA: 1
DIARRHEA: 0
COUGH: 1
NAIL CHANGES: 0

## 2023-08-29 NOTE — PROGRESS NOTES
"Chief Complaint/HPI:    DM type 2: patient has taken insulin since 12/2021. 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 Patient has not had blood work for DM checked recently, patient takes insulin and Januvia,  she does not take Ozempic. Patient states that she has financial restraints     history of MRSA: patient had lesions  on the buttocks recently, she was treated with antibiotic therapy and prednisone. Redness has resolved, still has \"black circles\" on the back. Patient has a lesion on the right back area and a \"pimple\" on the chin     hyperlipidemia: patient apparently is not taking atorvastatin now     smoker: patient still smokes about 1 ppd she admits, she is trying to cut down.      ADHD/ depression/ PTSD: patient has been homeless in the past she states, patient prefers to avoid medication therapy, she prefers talking to someone, she currently talks to her friends who have similar issues     uterine fibroid: patient had surgery completed, hysterectomy was done     ROS otherwise negative aside from what was mentioned above in HPI.      Patient Active Problem List   Diagnosis    Abdominal pain, suprapubic    Achilles bursitis of left lower extremity    Achilles tendinitis of left lower extremity    Acute bacterial bronchitis    Acute otitis media, bilateral    Acute pain in female pelvis    ADD (attention deficit disorder)    Allergic rhinitis    Back pain    Bilateral otitis externa    Chronic constipation    Colon polyp    Cough    Depression    Dermatitis, photoallergic    Dysuria    Generalized abdominal pain    GERD without esophagitis    Hand pain    Hematuria    HLD (hyperlipidemia)    Insomnia    " Insulin-requiring or dependent type II diabetes mellitus (CMS/HCC)    Knee pain    Left foot pain    Leukocytes in urine    Lipoma    Nasal congestion    Nausea and vomiting    Paresthesias    Plantar fasciitis, bilateral    Post op infection    Postoperative pain    Pruritic rash    Tongue papillae hypertrophy    Type 2 diabetes mellitus (CMS/HCC)    URI, acute    Urinary symptom or sign    Uterine fibroid    Vaginal yeast infection    Obesity, Class III, BMI 40-49.9 (morbid obesity) (CMS/HCC)    Abnormal uterine bleeding due to subserous leiomyoma of uterus    Rash    Allergic dermatitis    GERD (gastroesophageal reflux disease)    Other hyperlipidemia    Acute otitis media, left    Yeast infection    Type 2 diabetes mellitus without retinopathy (CMS/HCC)    Tobacco use disorder    Sleep apnea    PTSD (post-traumatic stress disorder)    Myopia with astigmatism and presbyopia, bilateral    Migraines    Legal intervention    Internal hemorrhoids    History of herpes genitalis    History of Helicobacter pylori infection    Myoma    Fibromyalgia    Fatty liver    Diarrhea    Cellulitis    Fibroids, subserous    Acute lower urinary tract infection    Borderline personality disorder (CMS/HCC)    Chronic fatigue    Dyspnea    Excessive daytime sleepiness    Leukocytosis    Noncompliance with medication regimen    Rectal hemorrhage    Recurrent major depressive disorder (CMS/HCC)    Rupture of biceps tendon    Tobacco dependence syndrome    Abscess         Past Medical History:   Diagnosis Date    Achilles tendinitis, left leg 02/02/2023    Encounter for screening for malignant neoplasm of colon     Encounter for colonoscopy in patient with family history of colon cancer    Personal history of other diseases of the digestive system     History of acute pancreatitis    Personal history of other endocrine, nutritional and metabolic disease     History of type 1 diabetes mellitus    Type 2 diabetes mellitus (CMS/HCC)  02/02/2023     Past Surgical History:   Procedure Laterality Date    OTHER SURGICAL HISTORY  06/29/2022    No history of surgery     Social History     Social History Narrative    Not on file         ALLERGIES  Hydrocodone-acetaminophen, Morphine, Oxycodone-acetaminophen, and Wool      MEDICATIONS  Current Outpatient Medications on File Prior to Visit   Medication Sig Dispense Refill    blood sugar diagnostic (Accu-Chek Guide test strips) strip TEST 3 TIMES DAILY. 100 strip 11    clotrimazole (Lotrimin) 1 % vaginal cream Insert 1 applicator into the vagina once daily in the evening.      insulin glargine (Basaglar KwikPen U-100 Insulin) 100 unit/mL (3 mL) pen INJECT 40 UNITS UNDER THE SKIN ONCE DAILY IN THE MORNING. 21 mL 3    lancets misc 1 each  in the morning and 1 each in the evening and 1 each before bedtime. As directed. 90 each 3    omeprazole (PriLOSEC) 40 mg DR capsule Take 1 capsule (40 mg) by mouth once daily in the morning. Take before meals. 90 capsule 3    OneTouch Delica Plus Lancet 33 gauge misc       SITagliptin phosphate (Januvia) 100 mg tablet TAKE 1 TABLET BY MOUTH DAILY 90 tablet 3    [DISCONTINUED] cephalexin (Keflex) 500 mg capsule Take 1 capsule (500 mg) by mouth 2 times a day. After meals      acetaminophen (Tylenol) 500 mg tablet Take 2 tablets (1,000 mg) by mouth every 6 hours.      azelastine (Astelin) 137 mcg (0.1 %) nasal spray Administer 2 sprays into affected nostril(s) once daily.      clotrimazole-betamethasone (Lotrisone) cream Apply topically 2 times a day. Apply and rub in a thin film to affected areas 60 g 3    diphenhydrAMINE (Benadryl Allergy) 25 mg tablet Take 1 tablet (25 mg) by mouth every 6 hours if needed for itching or allergies.      elderberry fruit 50 mg/5 mL syrup 1   once a day      flash glucose sensor (FREESTYLE KAMALJIT 2 SENSOR [facility])       hydrocortisone 1 % cream Hydrocortisone 1 % External Cream   Quantity: 28  Refills: 0        Start : 27-Jan-2022   Active       ibuprofen 800 mg tablet Take 1 tablet (800 mg) by mouth.      ketorolac (Toradol) 10 mg tablet       pantoprazole (ProtoNix) 40 mg EC tablet Take 1 tablet (40 mg) by mouth 2 times a day.      Pepcid 20 mg tablet Take 1 tablet (20 mg) by mouth once daily.      predniSONE (Deltasone) 20 mg tablet Take 1 tablet (20 mg) by mouth once daily.      predniSONE (Deltasone) 50 mg tablet Take 1 tablet (50 mg) by mouth once daily.      [DISCONTINUED] albuterol 90 mcg/actuation inhaler Inhale 1 puff every 4 hours if needed for shortness of breath. (Patient not taking: Reported on 8/29/2023) 18 g 3    [DISCONTINUED] atorvastatin (Lipitor) 20 mg tablet Take 1 tablet (20 mg) by mouth once daily at bedtime. (Patient not taking: Reported on 8/29/2023) 90 tablet 3    [DISCONTINUED] benzonatate (Tessalon) 100 mg capsule Take 1 capsule (100 mg) by mouth 3 times a day as needed for cough. Do not crush or chew. 2-3 times daily as directed (Patient not taking: Reported on 8/29/2023) 20 capsule 3    [DISCONTINUED] fluconazole (Diflucan) 150 mg tablet Take 1 tablet (150 mg) by mouth see administration instructions. May repeat x 1 if symptoms persist (Patient not taking: Reported on 8/29/2023) 2 tablet 1    [DISCONTINUED] melatonin 3 mg tablet Take 1 tablet (3 mg) by mouth once daily at bedtime. (Patient not taking: Reported on 8/29/2023) 90 tablet 3    [DISCONTINUED] miconazole (Micotin) 2 % vaginal cream APPLY INTRAVAGINALLY AT BEDTIME NIGHTLY.  Strength: 2 % (Patient not taking: Reported on 8/29/2023) 45 g 3    [DISCONTINUED] polyethylene glycol (Glycolax) 17 gram/dose powder Take 17 g by mouth once daily as needed (constipation). In liquid (Patient not taking: Reported on 8/29/2023) 540 g 11     Current Facility-Administered Medications on File Prior to Visit   Medication Dose Route Frequency Provider Last Rate Last Admin    semaglutide (Ozempic) injection 0.25 mg  0.25 mg subcutaneous Once Hany Sabillon MD             PHYSICAL  "EXAM  /66 (BP Location: Right arm, Patient Position: Sitting, BP Cuff Size: Large adult)   Pulse 107   Temp 36.5 °C (97.7 °F)   Resp 16   Ht 1.676 m (5' 6\")   Wt 108 kg (237 lb)   SpO2 97%   BMI 38.25 kg/m²   Body mass index is 38.25 kg/m².  Constitutional   General appearance: Abnormal.  well developed, well nourished, morbidly obese , speech is rapid, A and O x 3  Eyes   Inspection of eyes: Sclera and conjunctiva were normal.   Ears, Nose, Mouth, and Throat   Ears: Auricles: Normal.   Pulmonary   Respiratory assessment: No respiratory distress, normal respiratory rhythm and effort.    Auscultation of Lungs: Clear bilateral breath sounds.   Cardiovascular   Auscultation of heart: Apical pulse normal, heart rate and rhythm normal, normal S1 and S2, no murmurs and no pericardial rub.    Exam for edema: No peripheral edema.   Skin   Examination of the skin for lesions: Abnormal.  a well demarcated raised lesion over the left chin, submental region, a 2nd lesion is present over the right lateral abdomen, it appears to be swelled, it is tender. The lesions are erythematous.   Neurologic   Cranial nerves: Nerves 2-12 were intact, no focal neuro defects.   Psychiatric   Orientation: Oriented to person, place, and time.    Mood and affect: Normal.     ASSESSMENT/PLAN  Problem List Items Addressed This Visit       Insulin-requiring or dependent type II diabetes mellitus (CMS/HCC)    Current Assessment & Plan     Check labs, glucose readings may have an effect on recurrence of abscesses         Relevant Orders    CBC and Auto Differential    Comprehensive metabolic panel    Hemoglobin A1c    Albumin, urine, random    Obesity, Class III, BMI 40-49.9 (morbid obesity) (CMS/HCC)    Relevant Orders    CBC and Auto Differential    Comprehensive metabolic panel    Other hyperlipidemia    Current Assessment & Plan     Check labs, patient stopped atorvastatin         Relevant Orders    CBC and Auto Differential    Lipid " panel    Comprehensive metabolic panel    Fatty liver    Current Assessment & Plan     Check labs         Relevant Orders    CBC and Auto Differential    Lipid panel    Comprehensive metabolic panel    Noncompliance with medication regimen - Primary    Relevant Orders    CBC and Auto Differential    Comprehensive metabolic panel    Tobacco dependence syndrome    Relevant Orders    CBC and Auto Differential    Comprehensive metabolic panel    Abscess    Current Assessment & Plan     Concerned about MRSA abscess, patient denies pregnancy, post hysterectomy. Will treat with Bactrim DS, trial of Bactroban under the nails to prevent MRSA transmission.         Relevant Medications    sulfamethoxazole-trimethoprim (Bactrim DS) 800-160 mg tablet    mupirocin (Bactroban) 2 % ointment    chlorhexidine (Hibiclens) 4 % external liquid    disposable gloves misc    Other Relevant Orders    CBC and Auto Differential    Comprehensive metabolic panel    Referral to Infectious Disease     Referral to ID for an assessment of recurrent abscesses     Follow up in 3 months    Hany Sabillon MD

## 2023-08-29 NOTE — ASSESSMENT & PLAN NOTE
Concerned about MRSA abscess, patient denies pregnancy, post hysterectomy. Will treat with Bactrim DS, trial of Bactroban under the nails to prevent MRSA transmission.

## 2023-08-30 ENCOUNTER — TELEPHONE (OUTPATIENT)
Dept: PRIMARY CARE | Facility: CLINIC | Age: 38
End: 2023-08-30
Payer: MEDICAID

## 2023-08-30 NOTE — TELEPHONE ENCOUNTER
Pt called to say that she started to take the Bactrum and woke up last night with a huge, itchy rash on her back.  She did go to the Emergency Room where they gave her some medication to help.  She has called scheduling for an Infectious Disease doctor but not able to get into till November.  She has spoken to another Infectious Disease doctor's office but waiting for there answer on if they can see her.  Pt said that she would like to know if we are able to squeeze her in with a  Infectious Disease doctor because she feels her quality of life isn't good.  Is there anything that we can do?

## 2023-10-11 ENCOUNTER — TELEPHONE (OUTPATIENT)
Dept: GASTROENTEROLOGY | Facility: CLINIC | Age: 38
End: 2023-10-11
Payer: MEDICAID

## 2023-10-11 NOTE — TELEPHONE ENCOUNTER
Patient was scheduled at 1:15 on a hospital coverage day. This is a scheduling error since I start at 1:45 and already have 3 new patients scheduled that afternoon. Please reschedule patient

## 2023-10-23 ENCOUNTER — APPOINTMENT (OUTPATIENT)
Dept: GASTROENTEROLOGY | Facility: CLINIC | Age: 38
End: 2023-10-23
Payer: MEDICARE

## 2023-10-24 ENCOUNTER — APPOINTMENT (OUTPATIENT)
Dept: RADIOLOGY | Facility: HOSPITAL | Age: 38
End: 2023-10-24
Payer: MEDICARE

## 2023-10-24 ENCOUNTER — HOSPITAL ENCOUNTER (EMERGENCY)
Facility: HOSPITAL | Age: 38
Discharge: HOME | End: 2023-10-24
Attending: EMERGENCY MEDICINE
Payer: MEDICARE

## 2023-10-24 VITALS
HEART RATE: 90 BPM | BODY MASS INDEX: 38.99 KG/M2 | WEIGHT: 234 LBS | OXYGEN SATURATION: 96 % | HEIGHT: 65 IN | TEMPERATURE: 98.4 F | SYSTOLIC BLOOD PRESSURE: 115 MMHG | RESPIRATION RATE: 20 BRPM | DIASTOLIC BLOOD PRESSURE: 88 MMHG

## 2023-10-24 DIAGNOSIS — J06.9 VIRAL URI WITH COUGH: ICD-10-CM

## 2023-10-24 LAB
FLUAV RNA RESP QL NAA+PROBE: NOT DETECTED
FLUBV RNA RESP QL NAA+PROBE: NOT DETECTED
SARS-COV-2 RNA RESP QL NAA+PROBE: NOT DETECTED

## 2023-10-24 PROCEDURE — 2500000002 HC RX 250 W HCPCS SELF ADMINISTERED DRUGS (ALT 637 FOR MEDICARE OP, ALT 636 FOR OP/ED): Performed by: EMERGENCY MEDICINE

## 2023-10-24 PROCEDURE — 94640 AIRWAY INHALATION TREATMENT: CPT

## 2023-10-24 PROCEDURE — 71045 X-RAY EXAM CHEST 1 VIEW: CPT | Mod: FOREIGN READ | Performed by: RADIOLOGY

## 2023-10-24 PROCEDURE — 99283 EMERGENCY DEPT VISIT LOW MDM: CPT | Mod: 25 | Performed by: EMERGENCY MEDICINE

## 2023-10-24 PROCEDURE — 71045 X-RAY EXAM CHEST 1 VIEW: CPT | Mod: FY

## 2023-10-24 PROCEDURE — 87636 SARSCOV2 & INF A&B AMP PRB: CPT

## 2023-10-24 RX ORDER — AZITHROMYCIN 250 MG/1
250 TABLET, FILM COATED ORAL DAILY
Qty: 6 TABLET | Refills: 0 | Status: SHIPPED | OUTPATIENT
Start: 2023-10-24 | End: 2023-10-29

## 2023-10-24 RX ORDER — ALBUTEROL SULFATE 90 UG/1
1-2 AEROSOL, METERED RESPIRATORY (INHALATION) EVERY 6 HOURS PRN
Qty: 18 G | Refills: 0 | Status: SHIPPED | OUTPATIENT
Start: 2023-10-24 | End: 2024-06-10 | Stop reason: WASHOUT

## 2023-10-24 RX ORDER — IPRATROPIUM BROMIDE AND ALBUTEROL SULFATE 2.5; .5 MG/3ML; MG/3ML
3 SOLUTION RESPIRATORY (INHALATION) ONCE
Status: COMPLETED | OUTPATIENT
Start: 2023-10-24 | End: 2023-10-24

## 2023-10-24 RX ORDER — BENZONATATE 100 MG/1
100 CAPSULE ORAL 3 TIMES DAILY PRN
Qty: 15 CAPSULE | Refills: 0 | Status: SHIPPED | OUTPATIENT
Start: 2023-10-24 | End: 2023-10-29

## 2023-10-24 RX ADMIN — IPRATROPIUM BROMIDE AND ALBUTEROL SULFATE 3 ML: 2.5; .5 SOLUTION RESPIRATORY (INHALATION) at 01:37

## 2023-10-24 ASSESSMENT — PAIN DESCRIPTION - LOCATION: LOCATION: RIB CAGE

## 2023-10-24 ASSESSMENT — COLUMBIA-SUICIDE SEVERITY RATING SCALE - C-SSRS
2. HAVE YOU ACTUALLY HAD ANY THOUGHTS OF KILLING YOURSELF?: NO
6. HAVE YOU EVER DONE ANYTHING, STARTED TO DO ANYTHING, OR PREPARED TO DO ANYTHING TO END YOUR LIFE?: NO
1. IN THE PAST MONTH, HAVE YOU WISHED YOU WERE DEAD OR WISHED YOU COULD GO TO SLEEP AND NOT WAKE UP?: NO

## 2023-10-24 ASSESSMENT — PAIN SCALES - GENERAL: PAINLEVEL_OUTOF10: 8

## 2023-10-24 ASSESSMENT — PAIN - FUNCTIONAL ASSESSMENT: PAIN_FUNCTIONAL_ASSESSMENT: 0-10

## 2023-10-24 NOTE — ED PROVIDER NOTES
HPI   Chief Complaint   Patient presents with    Shortness of Breath     Sick for 1 week, OTC medications not helping.  Severe rib pain when coughing, cannot control bladder.       HPI:  The patient is a 38-year-old female that is had cough and chest congestion for the last 4 to 5 days cough is occasionally productive of mucousy phlegm she also has feels like she cannot take a deep breath in she did have mild fever couple days ago but that since has gotten better she was really encouraged to come in by her significant other to be checked out because her symptoms have lasted so long.  She has some mild nausea has not had a bowel movement in a couple days but still has a good oral intake and appetite    Limitations to history: None  Independent Historians: Significant other at bedside  External Records Reviewed: EMR records  ------------------------------------------------------------------------------------------------------------------------------------------  ROS: a ten point review of systems was performed and negative except as per HPI.  ------------------------------------------------------------------------------------------------------------------------------------------  PMH / PSH: as per HPI, reviewed in EMR and discussed with the patient []  MEDS:  reviewed in EMR and discussed with the patient []  ALLERGIES: reviewed in EMR[]  SocH:  as per HPI, otherwise reviewed in EMR significant tobacco use  FH:  as per HPI, otherwise reviewed in EMR []  ------------------------------------------------------------------------------------------------------------------------------------------  Physical Exam:  VS: As documented in the triage note and EMR flowsheet from this visit was reviewed  General: Well appearing. No acute distress.   Eyes:  Extraocular movements grossly intact. No scleral icterus.   HEENT: Atraumatic. Normocephalic.    Neck: Supple. No gross masses  CV: RRR, audible S1/S2, 2+ symmetric peripheral  pulses  Resp: Good air exchange occasional rhonchi on auscultation bilaterally. No respiratory distress.  Non-labored respirations  GI: Soft, non-tender, non-distended, no rebound or gaurding  MSK: Symmetric muscle bulk. No gross step offs or deformities.  Skin: Warm, dry, no obvious rash.  Neuro: Speech fluent. Awake. Alert. Appropriate conversation.  Psych: Appropriate mood and affect for situation  ------------------------------------------------------------------------------------------------------------------------------------------  Hospital Course / Medical Decision Making:  Patient is well-appearing on my assessment she is unable unable to take a deep breath without inducing cough also family who is at the bedside states that she coughs so hard and so much that it induces vomiting.  She has not really been taking anything to help control her symptoms at home.  She had a flu and COVID swab done here as well as chest x-ray which were negative for any focal infiltrative process and swabs were negative as well.  She was trialed with a DuoNeb breathing treatment here did improve her bronchospasm because of her significant cigarette smoking we will cover her for atypical coverage with a course of azithromycin albuterol inhaler and Tessalon Perles which were sent over to her pharmacy at [facility] she was encouraged to continue supportive care at home and to follow-up with her regular primary care physician    Patient care discussed with:   Social Determinants affecting care:     Final diagnosis and disposition: Viral URI            Jocelyn Brantley MD                                      No data recorded                Patient History   Past Medical History:   Diagnosis Date    Achilles tendinitis, left leg 02/02/2023    Encounter for screening for malignant neoplasm of colon     Encounter for colonoscopy in patient with family history of colon cancer    Personal history of other diseases of the digestive  system     History of acute pancreatitis    Personal history of other endocrine, nutritional and metabolic disease     History of type 1 diabetes mellitus    Type 2 diabetes mellitus (CMS/HCC) 02/02/2023     Past Surgical History:   Procedure Laterality Date    OTHER SURGICAL HISTORY  06/29/2022    No history of surgery     Family History   Problem Relation Name Age of Onset    Heart block Mother      Hypertension Mother      Lung disease Mother      Cancer Mother      Other (seasonal allergies) Mother      Other (cardiac disorder) Father      Hypertension Father      Lung disease Father      Cancer Father      Other (seasonal allergies) Father      Colon cancer Brother       Social History     Tobacco Use    Smoking status: Every Day     Packs/day: 1     Types: Cigarettes    Smokeless tobacco: Never   Vaping Use    Vaping Use: Every day    Substances: Nicotine, Flavoring   Substance Use Topics    Alcohol use: Not Currently    Drug use: Never       Physical Exam   ED Triage Vitals [10/24/23 0020]   Temp Heart Rate Resp BP   36.7 °C (98.1 °F) 98 18 120/84      SpO2 Temp src Heart Rate Source Patient Position   97 % -- -- --      BP Location FiO2 (%)     -- --       Physical Exam    ED Course & MDM        Medical Decision Making      Procedure  Procedures     Jocelyn Brantley MD  10/24/23 0133

## 2023-10-27 PROBLEM — L53.9: Status: ACTIVE | Noted: 2023-10-27

## 2023-10-27 PROBLEM — G47.33 OBSTRUCTIVE SLEEP APNEA SYNDROME: Status: ACTIVE | Noted: 2019-10-11

## 2023-10-27 PROBLEM — F90.9 ATTENTION DEFICIT HYPERACTIVITY DISORDER: Status: ACTIVE | Noted: 2023-10-27

## 2023-10-27 PROBLEM — L25.9 CONTACT DERMATITIS: Status: ACTIVE | Noted: 2023-10-27

## 2023-10-27 PROBLEM — F32.A DEPRESSIVE DISORDER: Status: ACTIVE | Noted: 2023-10-27

## 2023-10-27 PROBLEM — N32.9 DISORDER OF BLADDER: Status: ACTIVE | Noted: 2023-10-27

## 2023-10-27 PROBLEM — F31.9 BIPOLAR DISORDER (MULTI): Status: ACTIVE | Noted: 2020-01-14

## 2023-10-27 PROBLEM — M79.603 PAIN IN UPPER LIMB: Status: ACTIVE | Noted: 2023-10-27

## 2023-10-27 PROBLEM — R51.9 HEADACHE: Status: ACTIVE | Noted: 2023-10-27

## 2023-10-27 PROBLEM — G43.909 MIGRAINE: Status: ACTIVE | Noted: 2023-10-27

## 2023-10-27 PROBLEM — L98.9 DISORDER OF SKIN: Status: ACTIVE | Noted: 2023-10-27

## 2023-10-27 PROBLEM — L02.91 ABSCESS OF SKIN: Status: ACTIVE | Noted: 2023-10-27

## 2023-10-27 PROBLEM — F41.9 ANXIETY: Status: ACTIVE | Noted: 2023-10-27

## 2023-10-27 PROBLEM — I10 HYPERTENSIVE DISORDER: Status: ACTIVE | Noted: 2023-10-27

## 2023-10-27 PROBLEM — K64.9 HEMORRHOIDS: Status: ACTIVE | Noted: 2023-10-27

## 2023-10-27 PROBLEM — R11.0 NAUSEA: Status: ACTIVE | Noted: 2023-10-27

## 2023-10-27 PROBLEM — M19.90 ARTHRITIS: Status: ACTIVE | Noted: 2023-10-27

## 2023-10-27 PROBLEM — H91.90 HEARING LOSS: Status: ACTIVE | Noted: 2023-10-27

## 2023-10-27 PROBLEM — M25.529 PAIN IN ELBOW: Status: ACTIVE | Noted: 2023-10-27

## 2023-10-27 PROBLEM — G47.10 HYPERSOMNOLENCE: Status: ACTIVE | Noted: 2023-10-27

## 2023-10-27 PROBLEM — J06.9 ACUTE UPPER RESPIRATORY INFECTION: Status: ACTIVE | Noted: 2023-10-27

## 2023-10-27 PROBLEM — R10.9 ABDOMINAL PAIN: Status: ACTIVE | Noted: 2019-10-11

## 2023-10-27 PROBLEM — J45.909 ASTHMA (HHS-HCC): Status: ACTIVE | Noted: 2023-10-27

## 2023-11-02 ENCOUNTER — LAB (OUTPATIENT)
Dept: LAB | Facility: LAB | Age: 38
End: 2023-11-02
Payer: MEDICARE

## 2023-11-02 ENCOUNTER — OFFICE VISIT (OUTPATIENT)
Dept: GASTROENTEROLOGY | Facility: CLINIC | Age: 38
End: 2023-11-02
Payer: MEDICARE

## 2023-11-02 VITALS
HEART RATE: 111 BPM | SYSTOLIC BLOOD PRESSURE: 125 MMHG | BODY MASS INDEX: 36.96 KG/M2 | DIASTOLIC BLOOD PRESSURE: 85 MMHG | HEIGHT: 66 IN | WEIGHT: 230 LBS

## 2023-11-02 DIAGNOSIS — R10.9 ABDOMINAL PAIN, UNSPECIFIED ABDOMINAL LOCATION: ICD-10-CM

## 2023-11-02 DIAGNOSIS — R19.4 CHANGE IN BOWEL HABIT: ICD-10-CM

## 2023-11-02 DIAGNOSIS — K21.9 GASTROESOPHAGEAL REFLUX DISEASE, UNSPECIFIED WHETHER ESOPHAGITIS PRESENT: ICD-10-CM

## 2023-11-02 DIAGNOSIS — K59.09 CHRONIC CONSTIPATION: Primary | ICD-10-CM

## 2023-11-02 PROBLEM — K64.9 HEMORRHOIDS: Status: ACTIVE | Noted: 2021-01-05

## 2023-11-02 LAB — TSH SERPL-ACNC: 1.7 MIU/L (ref 0.44–3.98)

## 2023-11-02 PROCEDURE — 83516 IMMUNOASSAY NONANTIBODY: CPT

## 2023-11-02 PROCEDURE — 36415 COLL VENOUS BLD VENIPUNCTURE: CPT

## 2023-11-02 PROCEDURE — 84443 ASSAY THYROID STIM HORMONE: CPT

## 2023-11-02 PROCEDURE — 3079F DIAST BP 80-89 MM HG: CPT | Performed by: INTERNAL MEDICINE

## 2023-11-02 PROCEDURE — 99204 OFFICE O/P NEW MOD 45 MIN: CPT | Performed by: INTERNAL MEDICINE

## 2023-11-02 PROCEDURE — 3074F SYST BP LT 130 MM HG: CPT | Performed by: INTERNAL MEDICINE

## 2023-11-02 NOTE — PATIENT INSTRUCTIONS
I have ordered labs to investigate your symptoms.      It is likely that medication side effects are playing a role in your symptoms.    You should also make sure that your diabetes is controlled and that you maintain a healthy weight. Obesity and uncontrolled diabetes will worsen most GI symptoms.      Constipation  I recommend that you use MiraLAX for constipation. This is available over the counter. I would recommend that you start taking 1 capful once daily.  If that is not effective after 4-5 days you can increase the dose.  You can increase the dose up to 2 capfuls twice daily if needed.  If the initial dose is causing your stools to be too loose or too many bowel movements then you can decrease the dose as much as you need to, but try to find a dose that you can take every day or every other day that is giving you soft and easy to pass stools.

## 2023-11-02 NOTE — PROGRESS NOTES
[facility]    ASSESSMENT and PLAN:       Drea Fajardo is a 38 y.o. female with a significant past medical history of HTN, DM2, MEKA, obesity, HLD, constipation, GERD, chronic pain, PTSD, depression/anxiety, bipolar, ADHD, and migraines who presents for consultation requested by the ER providers (Shelly Lechuga MD) for the evaluation of abdominal pain.       Constipation and abdominal pain and rectal bleeding  Long standing/chronic symptoms consistent with slow transit constipation and IBS-c. She reportedly has had some work up of this in the past, but details from that are unclear. Bleeding is likely related to hemorrhoids or prolapse and difficult to rule out some component of pelvic floor dysfunction. Her comorbidities and medication side effects are also likely playing a role. Will order labs to evaluate symptoms and will also check gastric emptying study given history of diabetes. Will request records from her previous endoscopies.  - labs ordered  - MiraLAX for constipation, discussed need for self titration      GERD  Longstanding symptom and reported history of H pylori that appears to have been treated in the past. She also reports multiple previous EGDs that have been normal. Will continue PPI at this time and plan to obtain records from last EGD. Seems unlikely that another EGD would be needed at this time.  - continue PPI      Colon cancer screening  Increased risk based on family history. Unclear previous endoscopy results and will request records from [facility] to determine appropriate interval for next colonoscopy  - records requested from [facility]      Follow up in 3 months.      Darius Tyler MD        Gastroenterology    [facility]    Clinical   [facility]        Subjective   HISTORY OF PRESENT ILLNESS:     Chief Complaint  New Patient Visit (ED 9/22/23- Abdominal pain, nausea,  "changes in bowel habits)    History Of Present Illness:    Drea Fajardo is a 38 y.o. female with a significant past medical history of HTN, DM2, MEKA, obesity (Body mass index is 37.12 kg/m².), HLD, constipation, GERD, chronic pain, PTSD, depression/anxiety, bipolar, ADHD, and migraines who presents for consultation requested by the ER providers (Shelly Lechuga MD) for the evaluation of abdominal pain.    She follows with Hany Sabillon MD as her PCP.       She was seen in the [facility] ER on 9/22/2023. Labs at that time showed a normal CBC, normal CMP, and elevated lipase (131, uln of 82). CT at that time was normal with no evidence of pancreatitis.    She was previously seen by GI (Dr. Figueroa) in 2019 for constipation, abdominal pain, GERD, rectal bleeding and rectal pain. At that time she reported that an EGD and colonoscopy were done in 2016 and showed colon polyps. She had recommended starting Omprazole and MiraLAX.    Problem list overview note written by gurpreet IYER states that she had also seen GI in 2020 and was treated for H pylori after EGD and colonoscopy.    No previous endoscopy reports in the  EMR.      Today she complains of chronic constipation, GERD, and abdominal pain. She says that these symptoms have been present for years and that \"nobody can figure them out\". She says that she most recently saw GI at [facility], but she does not want to follow up there again. She says that her last EGD and colonoscopy were in 2019 at [facility] and these were normal except for polyps. She believes that she was supposed to have another colonoscopy in 3-5 years.    She reports constipation with a BM every 3 days. Stool is usually hard with a lot of straining, but there are occasions when she will have some looser stool. This is associated with increased gas/bloating as well as diffuse abdominal pain/discomfort. Occasional she will see spots/streaks of bright red blood in her stool. She reports a " history of rectal prolapse that started after her last colonoscopy. She says that she most saw GI at [facility].    She also reports chronic heartburn that has been worse over the last 2 months. She has been taking Nexium or Prilosec (whichever is cheaper) once a day. She complains of heartburn with sour taste in her mouth. She says that she was previously treated for H pylori.    She has been losing weight intentionally with Ozempic.      Patient denies any N/V, dysphagia, odynophagia, diarrhea, hematemesis, or melena.      Review of systems:   Review of Systems   Constitutional:  Positive for fatigue. Negative for chills, fever and unexpected weight change.   HENT:  Negative for congestion, sneezing, sore throat, trouble swallowing and voice change.    Respiratory:  Negative for cough, shortness of breath and wheezing.    Cardiovascular:  Negative for chest pain, palpitations and leg swelling.   Gastrointestinal:         As detailed above.   Genitourinary:  Negative for dysuria and hematuria.   Musculoskeletal:  Positive for arthralgias. Negative for myalgias.   Skin:  Negative for pallor and rash.   Neurological:  Negative for dizziness, seizures, syncope, weakness, numbness and headaches.   Hematological:  Negative for adenopathy. Does not bruise/bleed easily.   Psychiatric/Behavioral:  Negative for confusion. The patient is not nervous/anxious.    All other systems reviewed and are negative.      I performed a complete 10 point review of systems and it is negative except as noted in HPI or above.      PAST HISTORIES:       Past Medical History:  She has a past medical history of Achilles tendinitis, left leg (02/02/2023), Encounter for screening for malignant neoplasm of colon, Personal history of other diseases of the digestive system, Personal history of other endocrine, nutritional and metabolic disease, and Type 2 diabetes mellitus (CMS/HCC) (02/02/2023).    Past Surgical History:  She has a past surgical  "history that includes Other surgical history (06/29/2022) and Colonoscopy.      Social History:  She reports that she has been smoking cigarettes. She has been smoking an average of 1 pack per day. She has never used smokeless tobacco. She reports that she does not currently use alcohol. She reports that she does not use drugs.    Family History:  She says that her brother had \"a rare form\" of colon cancer diagnosed at the age of 37. She denies any other family history of GI disease, including no family history of pancreatitis, Crohn's, gastroesophageal cancer, or ulcerative colitis.    Family History   Problem Relation Name Age of Onset    Heart block Mother      Hypertension Mother      Lung disease Mother      Cancer Mother      Other (seasonal allergies) Mother      Other (cardiac disorder) Father      Hypertension Father      Lung disease Father      Cancer Father      Other (seasonal allergies) Father      Colon cancer Brother          Allergies:  Hydrocodone-acetaminophen, Oxycodone-acetaminophen, Wool, and Morphine      Objective   OBJECTIVE:       Last Recorded Vitals:  Vitals:    11/02/23 1139   BP: 125/85   Pulse: (!) 111   Weight: 104 kg (230 lb)   Height: 1.676 m (5' 6\")     /85   Pulse (!) 111   Ht 1.676 m (5' 6\")   Wt 104 kg (230 lb)   BMI 37.12 kg/m²      Physical Exam:    Physical Exam  Vitals reviewed.   Constitutional:       General: She is not in acute distress.     Appearance: She is not ill-appearing.   HENT:      Head: Normocephalic and atraumatic.   Eyes:      General: No scleral icterus.  Cardiovascular:      Rate and Rhythm: Normal rate and regular rhythm.      Pulses: Normal pulses.      Heart sounds: Normal heart sounds. No murmur heard.  Pulmonary:      Effort: Pulmonary effort is normal. No respiratory distress.      Breath sounds: Normal breath sounds. No wheezing.   Abdominal:      General: Bowel sounds are normal.      Palpations: Abdomen is soft.      Tenderness: There is " abdominal tenderness (diffuse). There is no rebound.   Musculoskeletal:         General: No swelling or deformity.   Skin:     General: Skin is warm and dry.      Coloration: Skin is not jaundiced.      Findings: No rash.   Neurological:      General: No focal deficit present.      Mental Status: She is alert and oriented to person, place, and time.   Psychiatric:         Mood and Affect: Mood normal.         Behavior: Behavior normal.         Thought Content: Thought content normal.         Judgment: Judgment normal.         Home Medications:  Prior to Admission medications    Medication Sig Start Date End Date Taking? Authorizing Provider   acetaminophen (Tylenol) 500 mg tablet Take 2 tablets (1,000 mg) by mouth every 6 hours.    Historical Provider, MD   albuterol 90 mcg/actuation inhaler Inhale 1-2 puffs every 6 hours if needed for wheezing. 10/24/23 11/23/23  Jocelyn Brantley MD   azelastine (Astelin) 137 mcg (0.1 %) nasal spray Administer 2 sprays into affected nostril(s) once daily. 12/14/21   Historical Provider, MD   azithromycin (Zithromax Z-Romain) 250 mg tablet Take 1 tablet (250 mg) by mouth once daily for 5 days. 10/24/23 10/29/23  Jocelyn Brantley MD   benzonatate (Tessalon) 100 mg capsule Take 1 capsule (100 mg) by mouth 3 times a day as needed for cough for up to 5 days. Do not crush or chew. 10/24/23 10/29/23  Jocelyn Brantley MD   blood sugar diagnostic (Accu-Chek Guide test strips) strip TEST 3 TIMES DAILY. 3/15/23   Hany Sabillon MD   cephalexin (Keflex) 500 mg capsule TAKE 1 CAPSULE BY MOUTH FOUR TIMES A DAY 8/30/23 8/29/24  JUAN CARLOS Jauregui-CNP   cephalexin (Keflex) 500 mg capsule TAKE 1 CAPSULE BY MOUTH FOUR TIMES A DAY 7/28/23 7/27/24  JUAN CARLOS Jauregui-CNP   chlorhexidine (Hibiclens) 4 % external liquid Apply topically once daily as needed for wound care. Use to wash daily for 5 days to reduce colonization from wounds 8/29/23   Hany Sabillon MD   clotrimazole  (Lotrimin) 1 % vaginal cream Insert 1 applicator into the vagina once daily in the evening. 7/25/23   Historical Provider, MD   clotrimazole-betamethasone (Lotrisone) cream Apply topically 2 times a day. Apply and rub in a thin film to affected areas 3/15/23   Hany Sabillon MD   diphenhydrAMINE (Benadryl Allergy) 25 mg tablet Take 1 tablet (25 mg) by mouth every 6 hours if needed for itching or allergies. 7/26/23   Historical Provider, MD   disposable gloves misc 2 each 4 times a day as needed (abscess avoidance). 8/29/23   Hany Sabillon MD   elderberry fruit 50 mg/5 mL syrup 1   once a day    Historical Provider, MD   flash glucose sensor (FREESTYLE KAMALJIT 2 SENSOR [facility] [place]     Historical Provider, MD   hydrocortisone 1 % cream Hydrocortisone 1 % External Cream   Quantity: 28  Refills: 0        Start : 27-Jan-2022   Active 1/27/22   Historical Provider, MD   ibuprofen 800 mg tablet Take 1 tablet (800 mg) by mouth. 8/20/21   Historical Provider, MD   insulin glargine (Basaglar KwikPen U-100 Insulin) 100 unit/mL (3 mL) pen INJECT 40 UNITS UNDER THE SKIN ONCE DAILY IN THE MORNING. 8/1/23   Hany Sabillon MD   ketorolac (Toradol) 10 mg tablet  10/27/22   Historical Provider, MD   lancets misc 1 each  in the morning and 1 each in the evening and 1 each before bedtime. As directed. 3/15/23   Hany Sabillon MD   omeprazole (PriLOSEC) 40 mg DR capsule Take 1 capsule (40 mg) by mouth once daily in the morning. Take before meals. 3/15/23 3/14/24  Hany Sabillon MD   OneTouch Delica Plus Lancet 33 gauge misc  11/3/22   Historical Provider, MD   pantoprazole (ProtoNix) 40 mg EC tablet Take 1 tablet (40 mg) by mouth 2 times a day. 12/9/20   Historical Provider, MD   Pepcid 20 mg tablet Take 1 tablet (20 mg) by mouth once daily. 7/26/23   Historical Provider, MD   predniSONE (Deltasone) 20 mg tablet Take 1 tablet (20 mg) by mouth once daily. 7/26/23   Historical Provider, MD   predniSONE  "(Deltasone) 50 mg tablet Take 1 tablet (50 mg) by mouth once daily. 7/28/23   Historical Provider, MD   predniSONE (Deltasone) 50 mg tablet TAKE 1 TABLET BY MOUTH ONCE DAILY 7/28/23 7/27/24  Barbara Jansen, APRN-CNP   SITagliptin phosphate (Januvia) 100 mg tablet TAKE 1 TABLET BY MOUTH DAILY 7/17/23   Hany Sabillon MD         Relevant Results Recent labs reviewed in the EMR.  Lab Results   Component Value Date    HGB 13.8 09/22/2023    HGB 13.7 07/28/2023    HGB 14.7 11/15/2022    MCV 84 09/22/2023    MCV 83 07/28/2023    MCV 83 11/15/2022     09/22/2023     07/28/2023     11/15/2022       Lab Results   Component Value Date    IRON 82 03/26/2019       Lab Results   Component Value Date     (L) 09/22/2023    K 3.8 09/22/2023     09/22/2023    BUN 16 09/22/2023    CREATININE 0.69 09/22/2023       Lab Results   Component Value Date    BILITOT 0.6 09/22/2023     Lab Results   Component Value Date    ALT 10 09/22/2023    ALT 9 07/28/2023    ALT 19 11/15/2022    AST 10 09/22/2023    AST 9 07/28/2023    AST 13 11/15/2022    ALKPHOS 57 09/22/2023    ALKPHOS 55 07/28/2023    ALKPHOS 66 11/15/2022       No results found for: \"CRP\"    No results found for: \"CALPS\"    Radiology: Reviewed imaging reviewed in the EMR.  XR chest 1 view    Result Date: 10/24/2023  STUDY: Chest Radiograph;  10/24/2023 at 12:48 AM INDICATION: Shortness of  breath and cough. COMPARISON: None available. ACCESSION NUMBER(S): [id number] ORDERING CLINICIAN: BARBARA JANSEN TECHNIQUE:  Frontal chest was obtained at 00:48 hours. FINDINGS: CARDIOMEDIASTINAL SILHOUETTE: Cardiomediastinal silhouette is normal in size and configuration.  LUNGS: Lungs are clear.  ABDOMEN: No remarkable upper abdominal findings.  BONES: No acute osseous changes.    No acute cardiopulmonary abnormality by radiograph. Signed by Rubin Martinez MD      "

## 2023-11-02 NOTE — LETTER
November 3, 2023     Shelly Lechuga MD  [address] [facility] [address]    Patient: Drea Fajardo   YOB: 1985   Date of Visit: 11/2/2023       Dear Dr. Shelly Lechuga MD:    Thank you for referring Drea Fajardo to me for evaluation. Below are my notes for this consultation.  If you have questions, please do not hesitate to call me. I look forward to following your patient along with you.       Sincerely,     Darius Tyler MD      CC: Hany Sabillon MD  ______________________________________________________________________________________         [facility]    ASSESSMENT and PLAN:       Drea Fajardo is a 38 y.o. female with a significant past medical history of HTN, DM2, MEKA, obesity, HLD, constipation, GERD, chronic pain, PTSD, depression/anxiety, bipolar, ADHD, and migraines who presents for consultation requested by the ER providers (Shelly Lechuga MD) for the evaluation of abdominal pain.       Constipation and abdominal pain and rectal bleeding  Long standing/chronic symptoms consistent with slow transit constipation and IBS-c. She reportedly has had some work up of this in the past, but details from that are unclear. Bleeding is likely related to hemorrhoids or prolapse and difficult to rule out some component of pelvic floor dysfunction. Her comorbidities and medication side effects are also likely playing a role. Will order labs to evaluate symptoms and will also check gastric emptying study given history of diabetes. Will request records from her previous endoscopies.  - labs ordered  - MiraLAX for constipation, discussed need for self titration      GERD  Longstanding symptom and reported history of H pylori that appears to have been treated in the past. She also reports multiple previous EGDs that have been normal. Will continue PPI at this time and plan to obtain records from last EGD. Seems unlikely that another EGD would be  "needed at this time.  - continue PPI      Colon cancer screening  Increased risk based on family history. Unclear previous endoscopy results and will request records from [facility] to determine appropriate interval for next colonoscopy  - records requested from [facility]      Follow up in 3 months.      Darius Tyler MD        Gastroenterology    [facility]    Clinical   [facility]        Subjective  HISTORY OF PRESENT ILLNESS:     Chief Complaint  New Patient Visit (ED 9/22/23- Abdominal pain, nausea, changes in bowel habits)    History Of Present Illness:    Drea Fajardo is a 38 y.o. female with a significant past medical history of HTN, DM2, MEKA, obesity (Body mass index is 37.12 kg/m².), HLD, constipation, GERD, chronic pain, PTSD, depression/anxiety, bipolar, ADHD, and migraines who presents for consultation requested by the ER providers (Shelly Lechuga MD) for the evaluation of abdominal pain.    She follows with Hany Sabillon MD as her PCP.       She was seen in the [facility] ER on 9/22/2023. Labs at that time showed a normal CBC, normal CMP, and elevated lipase (131, uln of 82). CT at that time was normal with no evidence of pancreatitis.    She was previously seen by GI (Dr. Figueroa) in 2019 for constipation, abdominal pain, GERD, rectal bleeding and rectal pain. At that time she reported that an EGD and colonoscopy were done in 2016 and showed colon polyps. She had recommended starting Omprazole and MiraLAX.    Problem list overview note written by a MA states that she had also seen GI in 2020 and was treated for H pylori after EGD and colonoscopy.    No previous endoscopy reports in the  EMR.      Today she complains of chronic constipation, GERD, and abdominal pain. She says that these symptoms have been present for years and that \"nobody can figure them out\". She says that she most " recently saw GI at [facility], but she does not want to follow up there again. She says that her last EGD and colonoscopy were in 2019 at [facility] and these were normal except for polyps. She believes that she was supposed to have another colonoscopy in 3-5 years.    She reports constipation with a BM every 3 days. Stool is usually hard with a lot of straining, but there are occasions when she will have some looser stool. This is associated with increased gas/bloating as well as diffuse abdominal pain/discomfort. Occasional she will see spots/streaks of bright red blood in her stool. She reports a history of rectal prolapse that started after her last colonoscopy. She says that she most saw GI at [facility].    She also reports chronic heartburn that has been worse over the last 2 months. She has been taking Nexium or Prilosec (whichever is cheaper) once a day. She complains of heartburn with sour taste in her mouth. She says that she was previously treated for H pylori.    She has been losing weight intentionally with Ozempic.      Patient denies any N/V, dysphagia, odynophagia, diarrhea, hematemesis, or melena.      Review of systems:   Review of Systems   Constitutional:  Positive for fatigue. Negative for chills, fever and unexpected weight change.   HENT:  Negative for congestion, sneezing, sore throat, trouble swallowing and voice change.    Respiratory:  Negative for cough, shortness of breath and wheezing.    Cardiovascular:  Negative for chest pain, palpitations and leg swelling.   Gastrointestinal:         As detailed above.   Genitourinary:  Negative for dysuria and hematuria.   Musculoskeletal:  Positive for arthralgias. Negative for myalgias.   Skin:  Negative for pallor and rash.   Neurological:  Negative for dizziness, seizures, syncope, weakness, numbness and headaches.   Hematological:  Negative for adenopathy. Does not bruise/bleed easily.   Psychiatric/Behavioral:  Negative for confusion. The patient is not  "nervous/anxious.    All other systems reviewed and are negative.      I performed a complete 10 point review of systems and it is negative except as noted in HPI or above.      PAST HISTORIES:       Past Medical History:  She has a past medical history of Achilles tendinitis, left leg (02/02/2023), Encounter for screening for malignant neoplasm of colon, Personal history of other diseases of the digestive system, Personal history of other endocrine, nutritional and metabolic disease, and Type 2 diabetes mellitus (CMS/HCC) (02/02/2023).    Past Surgical History:  She has a past surgical history that includes Other surgical history (06/29/2022) and Colonoscopy.      Social History:  She reports that she has been smoking cigarettes. She has been smoking an average of 1 pack per day. She has never used smokeless tobacco. She reports that she does not currently use alcohol. She reports that she does not use drugs.    Family History:  She says that her brother had \"a rare form\" of colon cancer diagnosed at the age of 37. She denies any other family history of GI disease, including no family history of pancreatitis, Crohn's, gastroesophageal cancer, or ulcerative colitis.    Family History   Problem Relation Name Age of Onset   • Heart block Mother     • Hypertension Mother     • Lung disease Mother     • Cancer Mother     • Other (seasonal allergies) Mother     • Other (cardiac disorder) Father     • Hypertension Father     • Lung disease Father     • Cancer Father     • Other (seasonal allergies) Father     • Colon cancer Brother          Allergies:  Hydrocodone-acetaminophen, Oxycodone-acetaminophen, Wool, and Morphine      Objective  OBJECTIVE:       Last Recorded Vitals:  Vitals:    11/02/23 1139   BP: 125/85   Pulse: (!) 111   Weight: 104 kg (230 lb)   Height: 1.676 m (5' 6\")     /85   Pulse (!) 111   Ht 1.676 m (5' 6\")   Wt 104 kg (230 lb)   BMI 37.12 kg/m²      Physical Exam:    Physical Exam  Vitals " reviewed.   Constitutional:       General: She is not in acute distress.     Appearance: She is not ill-appearing.   HENT:      Head: Normocephalic and atraumatic.   Eyes:      General: No scleral icterus.  Cardiovascular:      Rate and Rhythm: Normal rate and regular rhythm.      Pulses: Normal pulses.      Heart sounds: Normal heart sounds. No murmur heard.  Pulmonary:      Effort: Pulmonary effort is normal. No respiratory distress.      Breath sounds: Normal breath sounds. No wheezing.   Abdominal:      General: Bowel sounds are normal.      Palpations: Abdomen is soft.      Tenderness: There is abdominal tenderness (diffuse). There is no rebound.   Musculoskeletal:         General: No swelling or deformity.   Skin:     General: Skin is warm and dry.      Coloration: Skin is not jaundiced.      Findings: No rash.   Neurological:      General: No focal deficit present.      Mental Status: She is alert and oriented to person, place, and time.   Psychiatric:         Mood and Affect: Mood normal.         Behavior: Behavior normal.         Thought Content: Thought content normal.         Judgment: Judgment normal.         Home Medications:  Prior to Admission medications    Medication Sig Start Date End Date Taking? Authorizing Provider   acetaminophen (Tylenol) 500 mg tablet Take 2 tablets (1,000 mg) by mouth every 6 hours.    Historical Provider, MD   albuterol 90 mcg/actuation inhaler Inhale 1-2 puffs every 6 hours if needed for wheezing. 10/24/23 11/23/23  Jocelyn Brantley MD   azelastine (Astelin) 137 mcg (0.1 %) nasal spray Administer 2 sprays into affected nostril(s) once daily. 12/14/21   Historical Provider, MD   azithromycin (Zithromax Z-Romain) 250 mg tablet Take 1 tablet (250 mg) by mouth once daily for 5 days. 10/24/23 10/29/23  Jocelyn Brantley MD   benzonatate (Tessalon) 100 mg capsule Take 1 capsule (100 mg) by mouth 3 times a day as needed for cough for up to 5 days. Do not crush or chew. 10/24/23  10/29/23  Jocelyn Brantley MD   blood sugar diagnostic (Accu-Chek Guide test strips) strip TEST 3 TIMES DAILY. 3/15/23   Hany Sabillon MD   cephalexin (Keflex) 500 mg capsule TAKE 1 CAPSULE BY MOUTH FOUR TIMES A DAY 8/30/23 8/29/24  JUAN CARLOS Jauregui-CNP   cephalexin (Keflex) 500 mg capsule TAKE 1 CAPSULE BY MOUTH FOUR TIMES A DAY 7/28/23 7/27/24  JUAN CARLOS Jauregui-CNP   chlorhexidine (Hibiclens) 4 % external liquid Apply topically once daily as needed for wound care. Use to wash daily for 5 days to reduce colonization from wounds 8/29/23   Hany Sabillon MD   clotrimazole (Lotrimin) 1 % vaginal cream Insert 1 applicator into the vagina once daily in the evening. 7/25/23   Historical Provider, MD haquele-betamethasone (Lotrisone) cream Apply topically 2 times a day. Apply and rub in a thin film to affected areas 3/15/23   Hany Sabillon MD   diphenhydrAMINE (Benadryl Allergy) 25 mg tablet Take 1 tablet (25 mg) by mouth every 6 hours if needed for itching or allergies. 7/26/23   Historical Provider, MD   disposable gloves misc 2 each 4 times a day as needed (abscess avoidance). 8/29/23   Hany Sabillon MD   elderberry fruit 50 mg/5 mL syrup 1   once a day    Historical Provider, MD   flash glucose sensor (FREESTYLE KAMALJIT 2 SENSOR MIS)     Historical Provider, MD   hydrocortisone 1 % cream Hydrocortisone 1 % External Cream   Quantity: 28  Refills: 0        Start : 27-Jan-2022   Active 1/27/22   Historical Provider, MD   ibuprofen 800 mg tablet Take 1 tablet (800 mg) by mouth. 8/20/21   Historical Provider, MD   insulin glargine (Basaglar KwikPen U-100 Insulin) 100 unit/mL (3 mL) pen INJECT 40 UNITS UNDER THE SKIN ONCE DAILY IN THE MORNING. 8/1/23   Hany Sabillon MD   ketorolac (Toradol) 10 mg tablet  10/27/22   Historical Provider, MD   lancets misc 1 each  in the morning and 1 each in the evening and 1 each before bedtime. As directed. 3/15/23   Hany SAL  "MD [place]   omeprazole (PriLOSEC) 40 mg DR capsule Take 1 capsule (40 mg) by mouth once daily in the morning. Take before meals. 3/15/23 3/14/24  Hany Sabillon MD   OneTouch Delica Plus Lancet 33 gauge misc  11/3/22   Historical Provider, MD   pantoprazole (ProtoNix) 40 mg EC tablet Take 1 tablet (40 mg) by mouth 2 times a day. 12/9/20   Historical Provider, MD   Pepcid 20 mg tablet Take 1 tablet (20 mg) by mouth once daily. 7/26/23   Historical Provider, MD   predniSONE (Deltasone) 20 mg tablet Take 1 tablet (20 mg) by mouth once daily. 7/26/23   Historical Provider, MD   predniSONE (Deltasone) 50 mg tablet Take 1 tablet (50 mg) by mouth once daily. 7/28/23   Historical Provider, MD   predniSONE (Deltasone) 50 mg tablet TAKE 1 TABLET BY MOUTH ONCE DAILY 7/28/23 7/27/24  Anish Tiwari, APRN-CNP   SITagliptin phosphate (Januvia) 100 mg tablet TAKE 1 TABLET BY MOUTH DAILY 7/17/23   Hany Sabillon MD         Relevant Results Recent labs reviewed in the EMR.  Lab Results   Component Value Date    HGB 13.8 09/22/2023    HGB 13.7 07/28/2023    HGB 14.7 11/15/2022    MCV 84 09/22/2023    MCV 83 07/28/2023    MCV 83 11/15/2022     09/22/2023     07/28/2023     11/15/2022       Lab Results   Component Value Date    IRON 82 03/26/2019       Lab Results   Component Value Date     (L) 09/22/2023    K 3.8 09/22/2023     09/22/2023    BUN 16 09/22/2023    CREATININE 0.69 09/22/2023       Lab Results   Component Value Date    BILITOT 0.6 09/22/2023     Lab Results   Component Value Date    ALT 10 09/22/2023    ALT 9 07/28/2023    ALT 19 11/15/2022    AST 10 09/22/2023    AST 9 07/28/2023    AST 13 11/15/2022    ALKPHOS 57 09/22/2023    ALKPHOS 55 07/28/2023    ALKPHOS 66 11/15/2022       No results found for: \"CRP\"    No results found for: \"CALPS\"    Radiology: Reviewed imaging reviewed in the EMR.  XR chest 1 view    Result Date: 10/24/2023  STUDY: Chest Radiograph;  " 10/24/2023 at 12:48 AM INDICATION: Shortness of  breath and cough. COMPARISON: None available. ACCESSION NUMBER(S): [id number] ORDERING CLINICIAN: BARBARA JANSEN TECHNIQUE:  Frontal chest was obtained at 00:48 hours. FINDINGS: CARDIOMEDIASTINAL SILHOUETTE: Cardiomediastinal silhouette is normal in size and configuration.  LUNGS: Lungs are clear.  ABDOMEN: No remarkable upper abdominal findings.  BONES: No acute osseous changes.    No acute cardiopulmonary abnormality by radiograph. Signed by Rubin Martinez MD

## 2023-11-03 ENCOUNTER — TELEPHONE (OUTPATIENT)
Dept: GASTROENTEROLOGY | Facility: CLINIC | Age: 38
End: 2023-11-03
Payer: MEDICAID

## 2023-11-03 LAB — TTG IGA SER IA-ACNC: <1 U/ML

## 2023-11-03 ASSESSMENT — ENCOUNTER SYMPTOMS
ADENOPATHY: 0
HEADACHES: 0
HEMATURIA: 0
FATIGUE: 1
COUGH: 0
NUMBNESS: 0
FEVER: 0
ARTHRALGIAS: 1
WEAKNESS: 0
BRUISES/BLEEDS EASILY: 0
ROS GI COMMENTS: AS DETAILED ABOVE.
TROUBLE SWALLOWING: 0
DYSURIA: 0
CHILLS: 0
VOICE CHANGE: 0
UNEXPECTED WEIGHT CHANGE: 0
MYALGIAS: 0
WHEEZING: 0
NERVOUS/ANXIOUS: 0
DIZZINESS: 0
CONFUSION: 0
PALPITATIONS: 0
SEIZURES: 0
SHORTNESS OF BREATH: 0
SORE THROAT: 0

## 2023-11-03 NOTE — TELEPHONE ENCOUNTER
----- Message from Darius Tyler MD sent at 11/3/2023  8:51 AM EDT -----  Normal TSH    Office Staff: please print and mail to patient, MyChart not active

## 2023-11-04 ENCOUNTER — APPOINTMENT (OUTPATIENT)
Dept: RADIOLOGY | Facility: HOSPITAL | Age: 38
End: 2023-11-04
Payer: MEDICARE

## 2023-11-04 ENCOUNTER — HOSPITAL ENCOUNTER (EMERGENCY)
Facility: HOSPITAL | Age: 38
Discharge: HOME | End: 2023-11-04
Payer: MEDICARE

## 2023-11-04 VITALS
HEIGHT: 66 IN | TEMPERATURE: 97.9 F | WEIGHT: 230 LBS | OXYGEN SATURATION: 99 % | HEART RATE: 99 BPM | DIASTOLIC BLOOD PRESSURE: 99 MMHG | RESPIRATION RATE: 18 BRPM | SYSTOLIC BLOOD PRESSURE: 144 MMHG | BODY MASS INDEX: 36.96 KG/M2

## 2023-11-04 DIAGNOSIS — S63.612A SPRAIN OF RIGHT MIDDLE FINGER, UNSPECIFIED SITE OF DIGIT, INITIAL ENCOUNTER: Primary | ICD-10-CM

## 2023-11-04 PROCEDURE — 73140 X-RAY EXAM OF FINGER(S): CPT | Mod: RIGHT SIDE | Performed by: RADIOLOGY

## 2023-11-04 PROCEDURE — 99283 EMERGENCY DEPT VISIT LOW MDM: CPT | Mod: 25

## 2023-11-04 PROCEDURE — 73140 X-RAY EXAM OF FINGER(S): CPT | Mod: RT,FY

## 2023-11-04 ASSESSMENT — PAIN DESCRIPTION - LOCATION: LOCATION: FINGER (COMMENT WHICH ONE)

## 2023-11-04 ASSESSMENT — PAIN - FUNCTIONAL ASSESSMENT: PAIN_FUNCTIONAL_ASSESSMENT: 0-10

## 2023-11-04 ASSESSMENT — PAIN SCALES - GENERAL: PAINLEVEL_OUTOF10: 7

## 2023-11-04 ASSESSMENT — PAIN DESCRIPTION - ORIENTATION: ORIENTATION: RIGHT

## 2023-11-04 NOTE — ED PROVIDER NOTES
Chief Complaint   Patient presents with    Hand Pain     Pt has had pain for a few days       38-year-old female arrives to the emergency department the chief complaint of a right middle finger injury.  The patient was attempting to walk her dog, the dog's leash got wrapped around the patient's middle finger, the dog took off with slack in the leash, when it pulled, the patient states that she felt her finger snap, the patient has bruising and swelling that is isolated to the middle finger, as well as pain isolated to the middle finger extending into the base of the middle finger where it meets the hand.  Patient is able to move the finger, patient has distal sensation intact.  Patient endorses a 7 out of 10 pain.      History provided by:  Patient   used: No         PmHx, PsHx, Allergies, Family Hx, social Hx reviewed as documented    Given the focused nature of the complaint, only related components review of systems were evaluated, and abnormalities indicated in HPI    Physical Exam:    General: Patient is AAOx3, appears well developed, well nourished, is a good historian, answers questions appropriately    HEENT: head normocephalic, atraumatic, PERRLA, EOMs intact, oropharynx without erythema or exudate, buccal mucosa intact without lesions, TMs unremarkable, nose is patent bilateral    Pulmonary: CTAB, no accessory muscle use, able to speak full clear sentences    Cardiac: HRRR, no murmurs, rubs or gallops    GI: soft, non-tender, non-distended    Musculoskeletal: Right middle finger pain per HPI, full weight bearing, CUENCA, no joint effusions, clubbing or edema noted    Skin: Soft tissue swelling isolated to middle finger of the right hand as well as some diffuse bruising per HPI, otherwise intact, no lesions or rashes noted, turgor is good.    Medical Decision Making  This patient was seen in the emergency department with an attending physician available at all times throughout their ED  course    Primary consideration for this patient would be a sprain of the right middle finger versus fracture, an x-ray was used to further evaluate showing no acute abnormality or fracture, this is most consistent with a sprain given the events prior to arrival and mechanism of injury.    The primary nursing staff wrapped the patient's middle finger in an Ace wrap coming back into their hand to provide compression, patient will ice and take ibuprofen when she gets home, denying the need for any analgesic here while in the emergency department.    Patient is amenable to the plan of discharge as outlined above, all patient's questions pertaining to their ED course were answered in their entirety.  Strict return precautions were discussed with the patient and they verbalized understanding.  Further, it was made clear to the patient that from an emergent basis, all effort and testing was done to eliminate any imminent dangerous or potentially dangerous conditions of the patient however if their symptoms get much worse or feel life-threatening, they are to return to the emergency department or call 911 immediately.    Amount and/or Complexity of Data Reviewed  Radiology: ordered. Decision-making details documented in ED Course.       Diagnoses as of 11/04/23 1939   Sprain of right middle finger, unspecified site of digit, initial encounter       The patient has had the following imaging during this ER visit: XR FINGERS RIGHT 2+ VIEWS     Patient History   Past Medical History:   Diagnosis Date    Achilles tendinitis, left leg 02/02/2023    Encounter for screening for malignant neoplasm of colon     Encounter for colonoscopy in patient with family history of colon cancer    Personal history of other diseases of the digestive system     History of acute pancreatitis    Personal history of other endocrine, nutritional and metabolic disease     History of type 1 diabetes mellitus    Type 2 diabetes mellitus (CMS/[facility])  "02/02/2023     Past Surgical History:   Procedure Laterality Date    COLONOSCOPY      OTHER SURGICAL HISTORY  06/29/2022    No history of surgery     Family History   Problem Relation Name Age of Onset    Heart block Mother      Hypertension Mother      Lung disease Mother      Cancer Mother      Other (seasonal allergies) Mother      Other (cardiac disorder) Father      Hypertension Father      Lung disease Father      Cancer Father      Other (seasonal allergies) Father      Colon cancer Brother       Social History     Tobacco Use    Smoking status: Every Day     Packs/day: 1     Types: Cigarettes    Smokeless tobacco: Never   Vaping Use    Vaping Use: Every day    Substances: Nicotine, Flavoring   Substance Use Topics    Alcohol use: Not Currently    Drug use: Never       ED Triage Vitals [11/04/23 1831]   Temp Heart Rate Resp BP   36.6 °C (97.9 °F) 99 18 (!) 144/99      SpO2 Temp src Heart Rate Source Patient Position   99 % -- -- --      BP Location FiO2 (%)     -- --       Vitals:    11/04/23 1831   BP: (!) 144/99   Pulse: 99   Resp: 18   Temp: 36.6 °C (97.9 °F)   SpO2: 99%   Weight: 104 kg (230 lb)   Height: 1.676 m (5' 6\")               JUAN CARLOS Jauregui-CNP  11/04/23 1939    "

## 2023-11-04 NOTE — DISCHARGE INSTRUCTIONS
Please remember to rest, ice, compression, elevate.  Take 600 mg ibuprofen every 6 hours as needed

## 2023-11-09 ENCOUNTER — TELEPHONE (OUTPATIENT)
Dept: PRIMARY CARE | Facility: CLINIC | Age: 38
End: 2023-11-09
Payer: MEDICAID

## 2023-11-09 NOTE — TELEPHONE ENCOUNTER
Tried calling patient's number again, but she did not answer.  Tried calling her alternate number and she did not answer so I left a message.  Contacted the answer service to see what number she had left them as her contact number.  It was the primary number we have in her chart.  I tried calling that number again and left another message.

## 2023-11-09 NOTE — TELEPHONE ENCOUNTER
Pt called answering service about head pain. Tried calling her mobile at 5:51PM and got no response. Left voicemail.

## 2024-01-13 ENCOUNTER — APPOINTMENT (OUTPATIENT)
Dept: RADIOLOGY | Facility: HOSPITAL | Age: 39
End: 2024-01-13
Payer: MEDICARE

## 2024-01-13 ENCOUNTER — HOSPITAL ENCOUNTER (EMERGENCY)
Facility: HOSPITAL | Age: 39
Discharge: HOME | End: 2024-01-13
Attending: EMERGENCY MEDICINE
Payer: MEDICARE

## 2024-01-13 VITALS
TEMPERATURE: 97.4 F | HEART RATE: 104 BPM | BODY MASS INDEX: 35.36 KG/M2 | RESPIRATION RATE: 18 BRPM | OXYGEN SATURATION: 96 % | SYSTOLIC BLOOD PRESSURE: 129 MMHG | WEIGHT: 220 LBS | DIASTOLIC BLOOD PRESSURE: 76 MMHG | HEIGHT: 66 IN

## 2024-01-13 DIAGNOSIS — E11.65 TYPE 2 DIABETES MELLITUS WITH HYPERGLYCEMIA, WITH LONG-TERM CURRENT USE OF INSULIN (MULTI): ICD-10-CM

## 2024-01-13 DIAGNOSIS — R73.9 HYPERGLYCEMIA: Primary | ICD-10-CM

## 2024-01-13 DIAGNOSIS — E27.8 ADRENAL NODULE (MULTI): ICD-10-CM

## 2024-01-13 DIAGNOSIS — R10.30 LOWER ABDOMINAL PAIN: ICD-10-CM

## 2024-01-13 DIAGNOSIS — Z79.4 TYPE 2 DIABETES MELLITUS WITH HYPERGLYCEMIA, WITH LONG-TERM CURRENT USE OF INSULIN (MULTI): ICD-10-CM

## 2024-01-13 DIAGNOSIS — Z79.4 INSULIN-REQUIRING OR DEPENDENT TYPE II DIABETES MELLITUS (MULTI): ICD-10-CM

## 2024-01-13 DIAGNOSIS — E11.9 INSULIN-REQUIRING OR DEPENDENT TYPE II DIABETES MELLITUS (MULTI): ICD-10-CM

## 2024-01-13 LAB
ALBUMIN SERPL BCP-MCNC: 3.6 G/DL (ref 3.4–5)
ALP SERPL-CCNC: 55 U/L (ref 33–110)
ALT SERPL W P-5'-P-CCNC: 8 U/L (ref 7–45)
ANION GAP SERPL CALC-SCNC: 11 MMOL/L (ref 10–20)
APPEARANCE UR: CLEAR
AST SERPL W P-5'-P-CCNC: 10 U/L (ref 9–39)
B-OH-BUTYR SERPL-SCNC: 0.29 MMOL/L (ref 0.02–0.27)
BASOPHILS # BLD AUTO: 0.04 X10*3/UL (ref 0–0.1)
BASOPHILS NFR BLD AUTO: 0.9 %
BILIRUB SERPL-MCNC: 0.5 MG/DL (ref 0–1.2)
BILIRUB UR STRIP.AUTO-MCNC: NEGATIVE MG/DL
BUN SERPL-MCNC: 16 MG/DL (ref 6–23)
CALCIUM SERPL-MCNC: 8.7 MG/DL (ref 8.6–10.3)
CHLORIDE SERPL-SCNC: 99 MMOL/L (ref 98–107)
CO2 SERPL-SCNC: 24 MMOL/L (ref 21–32)
COLOR UR: ABNORMAL
CREAT SERPL-MCNC: 1.07 MG/DL (ref 0.5–1.05)
EGFRCR SERPLBLD CKD-EPI 2021: 69 ML/MIN/1.73M*2
EOSINOPHIL # BLD AUTO: 0.09 X10*3/UL (ref 0–0.7)
EOSINOPHIL NFR BLD AUTO: 2.1 %
ERYTHROCYTE [DISTWIDTH] IN BLOOD BY AUTOMATED COUNT: 12.4 % (ref 11.5–14.5)
GLUCOSE BLD MANUAL STRIP-MCNC: 279 MG/DL (ref 74–99)
GLUCOSE BLD MANUAL STRIP-MCNC: 311 MG/DL (ref 74–99)
GLUCOSE BLD MANUAL STRIP-MCNC: 541 MG/DL (ref 74–99)
GLUCOSE SERPL-MCNC: 525 MG/DL (ref 74–99)
GLUCOSE UR STRIP.AUTO-MCNC: ABNORMAL MG/DL
HCG UR QL IA.RAPID: NEGATIVE
HCT VFR BLD AUTO: 38.9 % (ref 36–46)
HGB BLD-MCNC: 13.7 G/DL (ref 12–16)
IMM GRANULOCYTES # BLD AUTO: 0.01 X10*3/UL (ref 0–0.7)
IMM GRANULOCYTES NFR BLD AUTO: 0.2 % (ref 0–0.9)
KETONES UR STRIP.AUTO-MCNC: NEGATIVE MG/DL
LEUKOCYTE ESTERASE UR QL STRIP.AUTO: NEGATIVE
LIPASE SERPL-CCNC: 32 U/L (ref 9–82)
LYMPHOCYTES # BLD AUTO: 1.15 X10*3/UL (ref 1.2–4.8)
LYMPHOCYTES NFR BLD AUTO: 26.5 %
MCH RBC QN AUTO: 29.3 PG (ref 26–34)
MCHC RBC AUTO-ENTMCNC: 35.2 G/DL (ref 32–36)
MCV RBC AUTO: 83 FL (ref 80–100)
MONOCYTES # BLD AUTO: 0.44 X10*3/UL (ref 0.1–1)
MONOCYTES NFR BLD AUTO: 10.1 %
NEUTROPHILS # BLD AUTO: 2.61 X10*3/UL (ref 1.2–7.7)
NEUTROPHILS NFR BLD AUTO: 60.2 %
NITRITE UR QL STRIP.AUTO: NEGATIVE
NRBC BLD-RTO: 0 /100 WBCS (ref 0–0)
PH UR STRIP.AUTO: 6 [PH]
PLATELET # BLD AUTO: 171 X10*3/UL (ref 150–450)
POTASSIUM SERPL-SCNC: 3.6 MMOL/L (ref 3.5–5.3)
PROT SERPL-MCNC: 7 G/DL (ref 6.4–8.2)
PROT UR STRIP.AUTO-MCNC: NEGATIVE MG/DL
RBC # BLD AUTO: 4.67 X10*6/UL (ref 4–5.2)
RBC # UR STRIP.AUTO: NEGATIVE /UL
SODIUM SERPL-SCNC: 130 MMOL/L (ref 136–145)
SP GR UR STRIP.AUTO: 1.03
UROBILINOGEN UR STRIP.AUTO-MCNC: <2 MG/DL
WBC # BLD AUTO: 4.3 X10*3/UL (ref 4.4–11.3)

## 2024-01-13 PROCEDURE — 96360 HYDRATION IV INFUSION INIT: CPT

## 2024-01-13 PROCEDURE — 96361 HYDRATE IV INFUSION ADD-ON: CPT

## 2024-01-13 PROCEDURE — 2500000002 HC RX 250 W HCPCS SELF ADMINISTERED DRUGS (ALT 637 FOR MEDICARE OP, ALT 636 FOR OP/ED): Performed by: EMERGENCY MEDICINE

## 2024-01-13 PROCEDURE — 82010 KETONE BODYS QUAN: CPT | Performed by: EMERGENCY MEDICINE

## 2024-01-13 PROCEDURE — 81025 URINE PREGNANCY TEST: CPT | Performed by: EMERGENCY MEDICINE

## 2024-01-13 PROCEDURE — 2550000001 HC RX 255 CONTRASTS: Performed by: EMERGENCY MEDICINE

## 2024-01-13 PROCEDURE — 80053 COMPREHEN METABOLIC PANEL: CPT | Performed by: EMERGENCY MEDICINE

## 2024-01-13 PROCEDURE — 74177 CT ABD & PELVIS W/CONTRAST: CPT | Performed by: RADIOLOGY

## 2024-01-13 PROCEDURE — 2500000004 HC RX 250 GENERAL PHARMACY W/ HCPCS (ALT 636 FOR OP/ED): Performed by: EMERGENCY MEDICINE

## 2024-01-13 PROCEDURE — 99284 EMERGENCY DEPT VISIT MOD MDM: CPT | Performed by: EMERGENCY MEDICINE

## 2024-01-13 PROCEDURE — 82947 ASSAY GLUCOSE BLOOD QUANT: CPT | Mod: 59

## 2024-01-13 PROCEDURE — 83690 ASSAY OF LIPASE: CPT | Performed by: EMERGENCY MEDICINE

## 2024-01-13 PROCEDURE — 81003 URINALYSIS AUTO W/O SCOPE: CPT | Performed by: EMERGENCY MEDICINE

## 2024-01-13 PROCEDURE — 87800 DETECT AGNT MULT DNA DIREC: CPT | Mod: PORLAB | Performed by: EMERGENCY MEDICINE

## 2024-01-13 PROCEDURE — 36415 COLL VENOUS BLD VENIPUNCTURE: CPT | Performed by: EMERGENCY MEDICINE

## 2024-01-13 PROCEDURE — 85025 COMPLETE CBC W/AUTO DIFF WBC: CPT | Performed by: EMERGENCY MEDICINE

## 2024-01-13 PROCEDURE — 74177 CT ABD & PELVIS W/CONTRAST: CPT

## 2024-01-13 RX ORDER — FLASH GLUCOSE SENSOR
KIT MISCELLANEOUS
Qty: 1 EACH | Refills: 0 | Status: SHIPPED | OUTPATIENT
Start: 2024-01-13 | End: 2024-03-07 | Stop reason: WASHOUT

## 2024-01-13 RX ORDER — INSULIN GLARGINE 100 [IU]/ML
INJECTION, SOLUTION SUBCUTANEOUS
Qty: 21 ML | Refills: 0 | Status: SHIPPED | OUTPATIENT
Start: 2024-01-13 | End: 2024-01-18 | Stop reason: SDUPTHER

## 2024-01-13 RX ADMIN — IOHEXOL 75 ML: 350 INJECTION, SOLUTION INTRAVENOUS at 18:08

## 2024-01-13 RX ADMIN — INSULIN HUMAN 10 UNITS: 100 INJECTION, SOLUTION PARENTERAL at 17:00

## 2024-01-13 RX ADMIN — SODIUM CHLORIDE 1000 ML: 9 INJECTION, SOLUTION INTRAVENOUS at 15:35

## 2024-01-13 ASSESSMENT — COLUMBIA-SUICIDE SEVERITY RATING SCALE - C-SSRS
2. HAVE YOU ACTUALLY HAD ANY THOUGHTS OF KILLING YOURSELF?: NO
1. IN THE PAST MONTH, HAVE YOU WISHED YOU WERE DEAD OR WISHED YOU COULD GO TO SLEEP AND NOT WAKE UP?: NO
6. HAVE YOU EVER DONE ANYTHING, STARTED TO DO ANYTHING, OR PREPARED TO DO ANYTHING TO END YOUR LIFE?: NO

## 2024-01-13 NOTE — PROGRESS NOTES
Patient care signed out to me by Dr. Kaur.  Plan to follow-up metabolic panel and lipase as well as CT abdomen pelvis    MDM/ED Course  ED Course as of 01/13/24 1943   Sat Jan 13, 2024   1634 Comprehensive metabolic panel(!!)  Metabolic panel shows hyperglycemia of 525, pseudohyponatremia of 130, no other electrolyte abnormalities.  Creatinine is elevated here which is new consistent with dehydration.  Liver functions normal.  Hydroxybutyrate slightly elevated 0.29.  No anion gap and normal bicarb, patient's not in DKA. []   1650 Regular subcutaneous insulin ordered via pharmacy []   1833  []   1942 CT shows no real acute findings.  Incidental findings of of hepatomegaly, borderline splenomegaly, lymphadenopathy and adrenal nodule.  These were all discussed with the patient with recommendation to follow-up with her outpatient provider.  Patient be written a prescription for insulin that frozen her refrigerator.  Patient stated not to use that insulin.  Patient also given a glucometer.  Feel the patient had a viral infection causing her abdominal lymphadenopathy and her hyperglycemia.  Patient needs outpatient follow-up with her primary care doctor.  Will order STD testing per patient's request.    No indication for admission.  Discussed findings and diagnosis with the patient, follow-up and return to ED precautions given, patient voiced understanding, agrees with plan, questions answered, patient was discharged in stable condition. []      ED Course User Index  [] Anish Harrison MD         Diagnoses as of 01/13/24 1943   Hyperglycemia   Adrenal nodule (CMS/HCC)   Lower abdominal pain   Type 2 diabetes mellitus with hyperglycemia, with long-term current use of insulin (CMS/HCC)
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
## 2024-01-14 LAB — HOLD SPECIMEN: NORMAL

## 2024-01-15 LAB
C TRACH RRNA SPEC QL NAA+PROBE: NEGATIVE
N GONORRHOEA DNA SPEC QL PROBE+SIG AMP: NEGATIVE

## 2024-01-18 ENCOUNTER — TELEPHONE (OUTPATIENT)
Dept: PRIMARY CARE | Facility: CLINIC | Age: 39
End: 2024-01-18
Payer: MEDICAID

## 2024-01-18 DIAGNOSIS — E11.9 INSULIN-REQUIRING OR DEPENDENT TYPE II DIABETES MELLITUS (MULTI): ICD-10-CM

## 2024-01-18 DIAGNOSIS — Z79.4 INSULIN-REQUIRING OR DEPENDENT TYPE II DIABETES MELLITUS (MULTI): ICD-10-CM

## 2024-01-18 RX ORDER — INSULIN GLARGINE 100 [IU]/ML
INJECTION, SOLUTION SUBCUTANEOUS
Qty: 21 ML | Refills: 0 | OUTPATIENT
Start: 2024-01-18

## 2024-01-18 RX ORDER — INSULIN GLARGINE 100 [IU]/ML
INJECTION, SOLUTION SUBCUTANEOUS
Qty: 36 ML | Refills: 1 | Status: SHIPPED | OUTPATIENT
Start: 2024-01-18
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
## 2024-02-23 ENCOUNTER — TELEPHONE (OUTPATIENT)
Dept: PRIMARY CARE | Facility: CLINIC | Age: 39
End: 2024-02-23
Payer: MEDICAID

## 2024-02-23 NOTE — TELEPHONE ENCOUNTER
"Just called the pt to confirm her appt on Thurs 2/29/24. She asked me to let you know that when she was in the ER \"a month ago\" , they found a mass on her liver. She has had major weight loss and they told her it isn't do to a virus she had, or the diabetic medication she takes. She wanted to let you know so you are aware for her appt.     I thought this should be brought to your attention now to see if there is anything she should do. Please advise.  "

## 2024-03-01 ENCOUNTER — TELEPHONE (OUTPATIENT)
Dept: PRIMARY CARE | Facility: CLINIC | Age: 39
End: 2024-03-01
Payer: MEDICAID

## 2024-03-01 NOTE — TELEPHONE ENCOUNTER
Pt called in and stated a mass on her liver was found and she has also lost 60 pounds in 4 months without tying. Pt states she barely eats and when she eats she feels nauseous. Pt is requesting a referral to see a specialist for this, please advise.

## 2024-03-06 NOTE — TELEPHONE ENCOUNTER
- Continuing metoprolol as above. Hold benazepril 20mg PO daily given DUNIA.   Patient has appointment this week

## 2024-03-07 ENCOUNTER — LAB (OUTPATIENT)
Dept: LAB | Facility: LAB | Age: 39
End: 2024-03-07
Payer: MEDICARE

## 2024-03-07 ENCOUNTER — OFFICE VISIT (OUTPATIENT)
Dept: PRIMARY CARE | Facility: CLINIC | Age: 39
End: 2024-03-07
Payer: MEDICARE

## 2024-03-07 VITALS
BODY MASS INDEX: 34.07 KG/M2 | DIASTOLIC BLOOD PRESSURE: 84 MMHG | RESPIRATION RATE: 16 BRPM | OXYGEN SATURATION: 99 % | SYSTOLIC BLOOD PRESSURE: 133 MMHG | HEART RATE: 86 BPM | HEIGHT: 66 IN | WEIGHT: 212 LBS | TEMPERATURE: 97.9 F

## 2024-03-07 DIAGNOSIS — F90.8 ATTENTION DEFICIT HYPERACTIVITY DISORDER (ADHD), OTHER TYPE: ICD-10-CM

## 2024-03-07 DIAGNOSIS — L02.91 ABSCESS: ICD-10-CM

## 2024-03-07 DIAGNOSIS — F60.3 BORDERLINE PERSONALITY DISORDER (MULTI): ICD-10-CM

## 2024-03-07 DIAGNOSIS — E78.5 HYPERLIPIDEMIA, UNSPECIFIED HYPERLIPIDEMIA TYPE: ICD-10-CM

## 2024-03-07 DIAGNOSIS — S81.812A LEG LACERATION, LEFT, INITIAL ENCOUNTER: ICD-10-CM

## 2024-03-07 DIAGNOSIS — J45.909 ASTHMA, UNSPECIFIED ASTHMA SEVERITY, UNSPECIFIED WHETHER COMPLICATED, UNSPECIFIED WHETHER PERSISTENT (HHS-HCC): ICD-10-CM

## 2024-03-07 DIAGNOSIS — K21.9 GERD WITHOUT ESOPHAGITIS: ICD-10-CM

## 2024-03-07 DIAGNOSIS — R20.2 PARESTHESIAS: ICD-10-CM

## 2024-03-07 DIAGNOSIS — Z11.4 ENCOUNTER FOR SCREENING FOR HIV: ICD-10-CM

## 2024-03-07 DIAGNOSIS — Z91.148 NONCOMPLIANCE WITH MEDICATION REGIMEN: ICD-10-CM

## 2024-03-07 DIAGNOSIS — E66.01 OBESITY, CLASS III, BMI 40-49.9 (MORBID OBESITY) (MULTI): ICD-10-CM

## 2024-03-07 DIAGNOSIS — K59.03 DRUG-INDUCED CONSTIPATION: ICD-10-CM

## 2024-03-07 DIAGNOSIS — Z11.59 NEED FOR HEPATITIS C SCREENING TEST: ICD-10-CM

## 2024-03-07 DIAGNOSIS — F32.A DEPRESSION, UNSPECIFIED DEPRESSION TYPE: ICD-10-CM

## 2024-03-07 DIAGNOSIS — F17.200 TOBACCO DEPENDENCE SYNDROME: ICD-10-CM

## 2024-03-07 DIAGNOSIS — E66.09 CLASS 1 OBESITY DUE TO EXCESS CALORIES WITH SERIOUS COMORBIDITY AND BODY MASS INDEX (BMI) OF 34.0 TO 34.9 IN ADULT: ICD-10-CM

## 2024-03-07 DIAGNOSIS — E78.49 OTHER HYPERLIPIDEMIA: ICD-10-CM

## 2024-03-07 DIAGNOSIS — F31.9 BIPOLAR AFFECTIVE DISORDER, REMISSION STATUS UNSPECIFIED (MULTI): ICD-10-CM

## 2024-03-07 DIAGNOSIS — K76.0 FATTY LIVER: ICD-10-CM

## 2024-03-07 DIAGNOSIS — E11.9 INSULIN-REQUIRING OR DEPENDENT TYPE II DIABETES MELLITUS (MULTI): ICD-10-CM

## 2024-03-07 DIAGNOSIS — E11.9 INSULIN-REQUIRING OR DEPENDENT TYPE II DIABETES MELLITUS (MULTI): Primary | ICD-10-CM

## 2024-03-07 DIAGNOSIS — Z21 HIV POSITIVE (MULTI): Primary | ICD-10-CM

## 2024-03-07 DIAGNOSIS — Z79.4 INSULIN-REQUIRING OR DEPENDENT TYPE II DIABETES MELLITUS (MULTI): Primary | ICD-10-CM

## 2024-03-07 DIAGNOSIS — Z79.4 INSULIN-REQUIRING OR DEPENDENT TYPE II DIABETES MELLITUS (MULTI): ICD-10-CM

## 2024-03-07 PROBLEM — E66.811 CLASS 1 OBESITY DUE TO EXCESS CALORIES WITH BODY MASS INDEX (BMI) OF 34.0 TO 34.9 IN ADULT: Status: ACTIVE | Noted: 2023-03-15

## 2024-03-07 PROBLEM — Z20.822 CONTACT WITH AND (SUSPECTED) EXPOSURE TO COVID-19: Status: ACTIVE | Noted: 2023-09-22

## 2024-03-07 PROBLEM — G56.00 CARPAL TUNNEL SYNDROME: Status: ACTIVE | Noted: 2024-03-07

## 2024-03-07 PROBLEM — R10.2 SUPRAPUBIC PAIN: Status: ACTIVE | Noted: 2019-10-11

## 2024-03-07 PROBLEM — Z86.39 HISTORY OF TYPE 1 DIABETES MELLITUS: Status: ACTIVE | Noted: 2024-03-07

## 2024-03-07 PROBLEM — R19.8 ALTERED BOWEL FUNCTION: Status: ACTIVE | Noted: 2024-03-07

## 2024-03-07 PROBLEM — H60.90 OTITIS EXTERNA: Status: ACTIVE | Noted: 2024-03-07

## 2024-03-07 PROBLEM — N72 INFLAMMATORY DISEASE OF CERVIX UTERI: Status: ACTIVE | Noted: 2022-10-04

## 2024-03-07 LAB
25(OH)D3 SERPL-MCNC: 20 NG/ML (ref 30–100)
ALBUMIN SERPL BCP-MCNC: 3.5 G/DL (ref 3.4–5)
ALP SERPL-CCNC: 55 U/L (ref 33–110)
ALT SERPL W P-5'-P-CCNC: 15 U/L (ref 7–45)
ANION GAP SERPL CALC-SCNC: 9 MMOL/L (ref 10–20)
AST SERPL W P-5'-P-CCNC: 15 U/L (ref 9–39)
BASOPHILS # BLD AUTO: 0.07 X10*3/UL (ref 0–0.1)
BASOPHILS NFR BLD AUTO: 0.9 %
BILIRUB SERPL-MCNC: 0.5 MG/DL (ref 0–1.2)
BUN SERPL-MCNC: 13 MG/DL (ref 6–23)
CALCIUM SERPL-MCNC: 9.2 MG/DL (ref 8.6–10.3)
CHLORIDE SERPL-SCNC: 103 MMOL/L (ref 98–107)
CHOLEST SERPL-MCNC: 178 MG/DL (ref 0–199)
CHOLESTEROL/HDL RATIO: 6.2
CO2 SERPL-SCNC: 27 MMOL/L (ref 21–32)
CREAT SERPL-MCNC: 0.7 MG/DL (ref 0.5–1.05)
CREAT UR-MCNC: 101.6 MG/DL (ref 20–320)
EGFRCR SERPLBLD CKD-EPI 2021: >90 ML/MIN/1.73M*2
EOSINOPHIL # BLD AUTO: 0.13 X10*3/UL (ref 0–0.7)
EOSINOPHIL NFR BLD AUTO: 1.7 %
ERYTHROCYTE [DISTWIDTH] IN BLOOD BY AUTOMATED COUNT: 13.2 % (ref 11.5–14.5)
GLUCOSE SERPL-MCNC: 206 MG/DL (ref 74–99)
HCT VFR BLD AUTO: 41.2 % (ref 36–46)
HCV AB SER QL: NONREACTIVE
HDLC SERPL-MCNC: 28.5 MG/DL
HGB BLD-MCNC: 13.5 G/DL (ref 12–16)
IMM GRANULOCYTES # BLD AUTO: 0.04 X10*3/UL (ref 0–0.7)
IMM GRANULOCYTES NFR BLD AUTO: 0.5 % (ref 0–0.9)
LDLC SERPL CALC-MCNC: 113 MG/DL
LYMPHOCYTES # BLD AUTO: 1.97 X10*3/UL (ref 1.2–4.8)
LYMPHOCYTES NFR BLD AUTO: 25.5 %
MCH RBC QN AUTO: 28.4 PG (ref 26–34)
MCHC RBC AUTO-ENTMCNC: 32.8 G/DL (ref 32–36)
MCV RBC AUTO: 87 FL (ref 80–100)
MICROALBUMIN UR-MCNC: 10 MG/L
MICROALBUMIN/CREAT UR: 9.8 UG/MG CREAT
MONOCYTES # BLD AUTO: 0.57 X10*3/UL (ref 0.1–1)
MONOCYTES NFR BLD AUTO: 7.4 %
NEUTROPHILS # BLD AUTO: 4.94 X10*3/UL (ref 1.2–7.7)
NEUTROPHILS NFR BLD AUTO: 64 %
NON HDL CHOLESTEROL: 150 MG/DL (ref 0–149)
NRBC BLD-RTO: 0 /100 WBCS (ref 0–0)
PLATELET # BLD AUTO: 209 X10*3/UL (ref 150–450)
POTASSIUM SERPL-SCNC: 3.8 MMOL/L (ref 3.5–5.3)
PROT SERPL-MCNC: 7.4 G/DL (ref 6.4–8.2)
RBC # BLD AUTO: 4.75 X10*6/UL (ref 4–5.2)
SODIUM SERPL-SCNC: 135 MMOL/L (ref 136–145)
TRIGL SERPL-MCNC: 184 MG/DL (ref 0–149)
TSH SERPL-ACNC: 1.58 MIU/L (ref 0.44–3.98)
VLDL: 37 MG/DL (ref 0–40)
WBC # BLD AUTO: 7.7 X10*3/UL (ref 4.4–11.3)

## 2024-03-07 PROCEDURE — 83036 HEMOGLOBIN GLYCOSYLATED A1C: CPT

## 2024-03-07 PROCEDURE — 80053 COMPREHEN METABOLIC PANEL: CPT

## 2024-03-07 PROCEDURE — 84443 ASSAY THYROID STIM HORMONE: CPT

## 2024-03-07 PROCEDURE — 82570 ASSAY OF URINE CREATININE: CPT

## 2024-03-07 PROCEDURE — 3008F BODY MASS INDEX DOCD: CPT | Performed by: INTERNAL MEDICINE

## 2024-03-07 PROCEDURE — 85025 COMPLETE CBC W/AUTO DIFF WBC: CPT

## 2024-03-07 PROCEDURE — 80061 LIPID PANEL: CPT

## 2024-03-07 PROCEDURE — 87389 HIV-1 AG W/HIV-1&-2 AB AG IA: CPT

## 2024-03-07 PROCEDURE — 90715 TDAP VACCINE 7 YRS/> IM: CPT | Performed by: INTERNAL MEDICINE

## 2024-03-07 PROCEDURE — 82306 VITAMIN D 25 HYDROXY: CPT

## 2024-03-07 PROCEDURE — 3079F DIAST BP 80-89 MM HG: CPT | Performed by: INTERNAL MEDICINE

## 2024-03-07 PROCEDURE — 90471 IMMUNIZATION ADMIN: CPT | Performed by: INTERNAL MEDICINE

## 2024-03-07 PROCEDURE — 86803 HEPATITIS C AB TEST: CPT

## 2024-03-07 PROCEDURE — 36415 COLL VENOUS BLD VENIPUNCTURE: CPT

## 2024-03-07 PROCEDURE — 4004F PT TOBACCO SCREEN RCVD TLK: CPT | Performed by: INTERNAL MEDICINE

## 2024-03-07 PROCEDURE — 82043 UR ALBUMIN QUANTITATIVE: CPT

## 2024-03-07 PROCEDURE — 86703 HIV-1/HIV-2 1 RESULT ANTBDY: CPT

## 2024-03-07 PROCEDURE — 99215 OFFICE O/P EST HI 40 MIN: CPT | Performed by: INTERNAL MEDICINE

## 2024-03-07 PROCEDURE — 3075F SYST BP GE 130 - 139MM HG: CPT | Performed by: INTERNAL MEDICINE

## 2024-03-07 RX ORDER — CEPHALEXIN 500 MG/1
500 CAPSULE ORAL 2 TIMES DAILY
Qty: 14 CAPSULE | Refills: 0 | Status: SHIPPED | OUTPATIENT
Start: 2024-03-07 | End: 2024-03-14

## 2024-03-07 RX ORDER — BLOOD SUGAR DIAGNOSTIC
1 STRIP MISCELLANEOUS 3 TIMES DAILY
Qty: 100 EACH | Refills: 6 | Status: SHIPPED | OUTPATIENT
Start: 2024-03-07

## 2024-03-07 RX ORDER — DEXTROSE 4 G
1 TABLET,CHEWABLE ORAL 3 TIMES DAILY
Qty: 1 EACH | Refills: 0 | Status: SHIPPED | OUTPATIENT
Start: 2024-03-07

## 2024-03-07 RX ORDER — LANCETS 33 GAUGE
EACH MISCELLANEOUS
Qty: 100 EACH | Refills: 6 | Status: SHIPPED | OUTPATIENT
Start: 2024-03-07

## 2024-03-07 RX ORDER — METFORMIN HYDROCHLORIDE 500 MG/1
500 TABLET, EXTENDED RELEASE ORAL
Qty: 180 TABLET | Refills: 3 | Status: SHIPPED | OUTPATIENT
Start: 2024-03-07 | End: 2025-03-07

## 2024-03-07 SDOH — ECONOMIC STABILITY: FOOD INSECURITY: WITHIN THE PAST 12 MONTHS, YOU WORRIED THAT YOUR FOOD WOULD RUN OUT BEFORE YOU GOT MONEY TO BUY MORE.: OFTEN TRUE

## 2024-03-07 SDOH — ECONOMIC STABILITY: FOOD INSECURITY: WITHIN THE PAST 12 MONTHS, THE FOOD YOU BOUGHT JUST DIDN'T LAST AND YOU DIDN'T HAVE MONEY TO GET MORE.: OFTEN TRUE

## 2024-03-07 ASSESSMENT — PATIENT HEALTH QUESTIONNAIRE - PHQ9
2. FEELING DOWN, DEPRESSED OR HOPELESS: NOT AT ALL
1. LITTLE INTEREST OR PLEASURE IN DOING THINGS: NOT AT ALL
SUM OF ALL RESPONSES TO PHQ9 QUESTIONS 1 & 2: 0

## 2024-03-07 ASSESSMENT — LIFESTYLE VARIABLES
SKIP TO QUESTIONS 9-10: 1
HOW MANY STANDARD DRINKS CONTAINING ALCOHOL DO YOU HAVE ON A TYPICAL DAY: PATIENT DOES NOT DRINK
HOW OFTEN DO YOU HAVE A DRINK CONTAINING ALCOHOL: NEVER
AUDIT-C TOTAL SCORE: 0
HOW OFTEN DO YOU HAVE SIX OR MORE DRINKS ON ONE OCCASION: NEVER

## 2024-03-07 NOTE — PROGRESS NOTES
Subjective   Patient ID: Drea Fajardo is a 38 y.o. female who presents for Follow-up (Pt went to the ER because she lost her  for her diabetes. Her sugar was very high, in the 500s. They did a CT scan and found a mass on her liver as well./She's also been sick for the past three months. States she has something stuck in the back of her throat./) and Weight Loss (States she's losing weight. Her acid reflux is really bad, to the point that if she eats too much she's throwing up her food. Her pill for GERD isn't working anymore.).    HPI     IDT2DM: Patient has taken insulin since 12/2021. Patient is on dual-medication therapy for glycemic control. Patient is currently only on basal-insulin therapy, Glargine 40u in the morning. The 2nd medication is Januvia 100 mg daily. She no longer has a glucometer and is looking to replace the device. No reports of peripheral neuropathy. She is not followed by an Ophthalmologist. Patient states that she has financial restraints. She reports of excessive weight loss within the last 4-months 50 Ibs due to nausea, vomiting, and early satiation. She was seen by GI () and was presenting with long-standing/chronic symptoms consistent with slow-transit constipation and IBS-C. She no longer uses MiraLAX. She is amenable to seeing a nutritionist. She drinks a lot of water, she was seen in the ER, she remains dehydrated. Patient c/o symptoms of GERD also.      Globus sensation: She reports of feeling a sensation to her throat upon swallowing. She is able to tolerate swallowing food and liquids. She localizes her sensation to the right side of her mid-neck.     History of MRSA: Patient had lesions on the buttocks, she was treated with antibiotic therapy and prednisone. She does report of any new lesions.     HLD: Patient apparently is not taking atorvastatin now, no recent lipid panel     Smoker: Patient still smokes about 1-2.5 ppd she admits     ADHD/Depression/PTSD/  "bipolar disorder: patient has been homeless in the past she states, patient prefers to avoid medication therapy, she prefers talking to someone, she currently talks to her friends who have similar issues. She is not seeking counseling services. No reports of recent mood fluctuations. Patient states that she no longer is sure that counseling would be helpful.     Uterine fibroid: patient had surgery completed, hysterectomy was done    ROS otherwise negative aside from what was mentioned above in HPI.     Objective   /84 (BP Location: Right arm, Patient Position: Sitting, BP Cuff Size: Adult)   Pulse 86   Temp 36.6 °C (97.9 °F)   Resp 16   Ht 1.676 m (5' 6\")   Wt 96.2 kg (212 lb)   LMP 07/16/2022   SpO2 99%   BMI 34.22 kg/m²     Physical Exam  Constitutional   General appearance: Abnormal.  well developed, well nourished, morbidly obese , speech is rapid, A and O x 3  Eyes   Inspection of eyes: Sclera and conjunctiva were normal.   Ears, Nose, Mouth, and Throat   Ears: Auricles: Normal. No thyromegaly or neck masses  Pulmonary   Respiratory assessment: No respiratory distress, normal respiratory rhythm and effort.    Auscultation of Lungs: Clear bilateral breath sounds. Occasional cough is noted   Cardiovascular   Auscultation of heart: Apical pulse normal, heart rate and rhythm normal, normal S1 and S2, no murmurs and no pericardial rub.    Exam for edema: No peripheral edema.   Skin   Patient has a an abrasion/ laceration on the left anterior leg, slight surrounding erythema noted. Patient admits slipping and falling a [employer]   Neurologic   Cranial nerves: Nerves 2-12 were intact, no focal neuro defects.   Psychiatric   Orientation: Oriented to person, place, and time.    Mood and affect: Normal.     Assessment/Plan   Problem List Items Addressed This Visit       Insulin-requiring or dependent type II diabetes mellitus (CMS/HCC) - Primary     Will add metformin therapy, trial of 500 mg twice daily, " continue insulin therapy, continue Januvia, check labs now         Relevant Medications    blood-glucose meter (OneTouch Ultra2 Meter) misc    OneTouch Ultra Test strip    OneTouch Delica Plus Lancet 33 gauge misc    metFORMIN XR (Glucophage-XR) 500 mg 24 hr tablet    Other Relevant Orders    Albumin , Urine Random    CBC and Auto Differential    Comprehensive Metabolic Panel    Hemoglobin A1C    Vitamin D 25-Hydroxy,Total (for eval of Vitamin D levels)    Referral to Nutrition Services    Class 1 obesity due to excess calories with body mass index (BMI) of 34.0 to 34.9 in adult    Relevant Orders    CBC and Auto Differential    Comprehensive Metabolic Panel    Vitamin D 25-Hydroxy,Total (for eval of Vitamin D levels)    Other hyperlipidemia    Relevant Orders    CBC and Auto Differential    Comprehensive Metabolic Panel    Lipid Panel    Vitamin D 25-Hydroxy,Total (for eval of Vitamin D levels)    Referral to Nutrition Services    Borderline personality disorder (CMS/HCC)    Relevant Orders    CBC and Auto Differential    Comprehensive Metabolic Panel    Vitamin D 25-Hydroxy,Total (for eval of Vitamin D levels)    Referral to [facility]    Referral to Nutrition Services    Noncompliance with medication regimen     Encourage the patient to seek behavioral health assessment, see nutrition support, monitor glucoses, take meds as directed          Tobacco dependence syndrome     Encourage smoking cessation also         Relevant Orders    Albumin , Urine Random    CBC and Auto Differential    Comprehensive Metabolic Panel    Vitamin D 25-Hydroxy,Total (for eval of Vitamin D levels)    Abscess    Relevant Orders    CBC and Auto Differential    Comprehensive Metabolic Panel    Vitamin D 25-Hydroxy,Total (for eval of Vitamin D levels)    Bipolar disorder (CMS/[facility])     Refer to access to behavioral health         Relevant Orders    CBC and Auto Differential    Comprehensive Metabolic Panel    TSH with  reflex to Free T4 if abnormal    Vitamin D 25-Hydroxy,Total (for eval of Vitamin D levels)    Referral to [facility]    Referral to Nutrition Services    Leg laceration, left, initial encounter     Will treat with cephalexin today, TDAP ordered. Notify the office if symptoms worsen         Relevant Medications    cephalexin (Keflex) 500 mg capsule     Other Visit Diagnoses       Obesity, Class III, BMI 40-49.9 (morbid obesity) (CMS/HCC)        Relevant Orders    CBC and Auto Differential    Comprehensive Metabolic Panel    Vitamin D 25-Hydroxy,Total (for eval of Vitamin D levels)    Referral to Nutrition Services    Need for hepatitis C screening test        Relevant Orders    Vitamin D 25-Hydroxy,Total (for eval of Vitamin D levels)    Hepatitis C antibody    Encounter for screening for HIV        Relevant Orders    Vitamin D 25-Hydroxy,Total (for eval of Vitamin D levels)    HIV 1/2 Antigen/Antibody Screen with Reflex to Confirmation          TDAP ordered today, check labs as ordered, add metformin therapy, referral to [facility] to behavioral health ordered, patient likely would be a candidate for food for life program. If the leg laceration noted becomes more erythematous or if drainage occurs , notify the office  Follow up in 3 months

## 2024-03-07 NOTE — ASSESSMENT & PLAN NOTE
Encourage the patient to seek behavioral health assessment, see nutrition support, monitor glucoses, take meds as directed

## 2024-03-07 NOTE — ASSESSMENT & PLAN NOTE
>>ASSESSMENT AND PLAN FOR TOBACCO DEPENDENCE SYNDROME WRITTEN ON 3/7/2024  1:14 PM BY HOANG AUSTIN MD    Encourage smoking cessation also

## 2024-03-07 NOTE — ASSESSMENT & PLAN NOTE
Will add metformin therapy, trial of 500 mg twice daily, continue insulin therapy, continue Januvia, check labs now

## 2024-03-08 LAB
EST. AVERAGE GLUCOSE BLD GHB EST-MCNC: 289 MG/DL
HBA1C MFR BLD: 11.7 %
HIV 1 & 2 AB SERPL IA.RAPID: ABNORMAL
HIV 1+2 AB+HIV1 P24 AG SERPL QL IA: ABNORMAL

## 2024-03-14 ENCOUNTER — TELEPHONE (OUTPATIENT)
Dept: PRIMARY CARE | Facility: CLINIC | Age: 39
End: 2024-03-14
Payer: MEDICAID

## 2024-03-14 NOTE — TELEPHONE ENCOUNTER
[facility] about the pt's positive HIV blood work.  He wanted to know if the pt was aware of the result because they have to call her to talk about HIV and the precautions she has to take.  He will call back on Monday to make sure that we have talked with the pt about the results, so he can call her on Monday. Has the pt been made aware? Please advise

## 2024-03-15 ENCOUNTER — LAB (OUTPATIENT)
Dept: LAB | Facility: LAB | Age: 39
End: 2024-03-15
Payer: MEDICARE

## 2024-03-15 DIAGNOSIS — B20 HIV INFECTION, UNSPECIFIED SYMPTOM STATUS (MULTI): ICD-10-CM

## 2024-03-15 DIAGNOSIS — Z21 HIV POSITIVE (MULTI): ICD-10-CM

## 2024-03-15 DIAGNOSIS — B20 HIV INFECTION, UNSPECIFIED SYMPTOM STATUS (MULTI): Primary | ICD-10-CM

## 2024-03-15 LAB
BASOPHILS # BLD AUTO: 0.04 X10*3/UL (ref 0–0.1)
BASOPHILS NFR BLD AUTO: 0.7 %
EOSINOPHIL # BLD AUTO: 0.11 X10*3/UL (ref 0–0.7)
EOSINOPHIL NFR BLD AUTO: 1.8 %
ERYTHROCYTE [DISTWIDTH] IN BLOOD BY AUTOMATED COUNT: 13.3 % (ref 11.5–14.5)
HCT VFR BLD AUTO: 40.7 % (ref 36–46)
HGB BLD-MCNC: 13.7 G/DL (ref 12–16)
IMM GRANULOCYTES # BLD AUTO: 0.02 X10*3/UL (ref 0–0.7)
IMM GRANULOCYTES NFR BLD AUTO: 0.3 % (ref 0–0.9)
LYMPHOCYTES # BLD AUTO: 1.54 X10*3/UL (ref 1.2–4.8)
LYMPHOCYTES NFR BLD AUTO: 25 %
MCH RBC QN AUTO: 29.1 PG (ref 26–34)
MCHC RBC AUTO-ENTMCNC: 33.7 G/DL (ref 32–36)
MCV RBC AUTO: 86 FL (ref 80–100)
MONOCYTES # BLD AUTO: 0.46 X10*3/UL (ref 0.1–1)
MONOCYTES NFR BLD AUTO: 7.5 %
NEUTROPHILS # BLD AUTO: 3.98 X10*3/UL (ref 1.2–7.7)
NEUTROPHILS NFR BLD AUTO: 64.7 %
NRBC BLD-RTO: 0 /100 WBCS (ref 0–0)
PLATELET # BLD AUTO: 187 X10*3/UL (ref 150–450)
RBC # BLD AUTO: 4.71 X10*6/UL (ref 4–5.2)
WBC # BLD AUTO: 6.2 X10*3/UL (ref 4.4–11.3)

## 2024-03-15 PROCEDURE — 87536 HIV-1 QUANT&REVRSE TRNSCRPJ: CPT

## 2024-03-15 PROCEDURE — 88184 FLOWCYTOMETRY/ TC 1 MARKER: CPT

## 2024-03-15 PROCEDURE — 36415 COLL VENOUS BLD VENIPUNCTURE: CPT

## 2024-03-15 PROCEDURE — 85025 COMPLETE CBC W/AUTO DIFF WBC: CPT

## 2024-03-15 PROCEDURE — 88185 FLOWCYTOMETRY/TC ADD-ON: CPT

## 2024-03-16 LAB
HIV1 RNA # PLAS NAA DL=20: ABNORMAL COPIES/ML
HIV1 RNA # PLAS NAA DL=20: DETECTED {COPIES}/ML
HIV1 RNA SPEC NAA+PROBE-LOG#: 4.74 LOG10 CPY/ML

## 2024-03-17 LAB
CD3+CD4+ CELLS # BLD: 0.34 X10E9/L
CD3+CD4+ CELLS # BLD: 339 /MM3
CD3+CD4+ CELLS NFR BLD: 22 %
CD3+CD4+ CELLS/CD3+CD8+ CLL BLD: 0.39 %
CD3+CD8+ CELLS # BLD: 0.86 X10E9/L
CD3+CD8+ CELLS NFR BLD: 56 %
LYMPHOCYTES # SPEC AUTO: 1.54 X10*3/UL

## 2024-03-20 NOTE — TELEPHONE ENCOUNTER
Patient is angry and upset and cussing at me because she does not understand what it meant that you need to see a specialist to have treatment of a disease. She thinks that she was told she was receiving medication from Dr. SCHULZ for her HIV and that is not stated anywhere in any message that I can see. I have read all messages to patient in this thread and she says she was not told that she needed to see a specialist first. She wants to file a formal complaint because she doesn't know why a doctor can't just give her medication because a doctor is a doctor and our whole office is incompetent. I offered to let her speak with the manager, she declined. Patient explained that she has had problems with every hospital system and they all treat her the same.

## 2024-03-22 ENCOUNTER — APPOINTMENT (OUTPATIENT)
Dept: NUTRITION | Facility: CLINIC | Age: 39
End: 2024-03-22
Payer: MEDICARE

## 2024-03-29 PROCEDURE — 87906 NFCT AGT GNTYP ALYS HIV1: CPT

## 2024-03-29 PROCEDURE — 87901 NFCT AGT GNTYP ALYS HIV1 REV: CPT

## 2024-03-29 PROCEDURE — 87900 PHENOTYPE INFECT AGENT DRUG: CPT

## 2024-04-10 ENCOUNTER — LAB REQUISITION (OUTPATIENT)
Dept: LAB | Facility: HOSPITAL | Age: 39
End: 2024-04-10
Payer: MEDICAID

## 2024-04-10 DIAGNOSIS — B20 HUMAN IMMUNODEFICIENCY VIRUS (HIV) DISEASE (MULTI): ICD-10-CM

## 2024-04-17 LAB
ELECTRONICALLY SIGNED BY: NORMAL
HIV GENOTYPE RESULTS: NORMAL

## 2024-05-01 ENCOUNTER — APPOINTMENT (OUTPATIENT)
Dept: PRIMARY CARE | Facility: CLINIC | Age: 39
End: 2024-05-01
Payer: MEDICAID

## 2024-06-08 ENCOUNTER — HOSPITAL ENCOUNTER (EMERGENCY)
Facility: HOSPITAL | Age: 39
Discharge: HOME | End: 2024-06-08
Attending: STUDENT IN AN ORGANIZED HEALTH CARE EDUCATION/TRAINING PROGRAM
Payer: MEDICARE

## 2024-06-08 VITALS
OXYGEN SATURATION: 98 % | BODY MASS INDEX: 32.14 KG/M2 | SYSTOLIC BLOOD PRESSURE: 122 MMHG | TEMPERATURE: 97 F | HEART RATE: 90 BPM | DIASTOLIC BLOOD PRESSURE: 83 MMHG | RESPIRATION RATE: 20 BRPM | WEIGHT: 200 LBS | HEIGHT: 66 IN

## 2024-06-08 DIAGNOSIS — L02.91 ABSCESS: Primary | ICD-10-CM

## 2024-06-08 DIAGNOSIS — L03.211 CELLULITIS OF FACE: ICD-10-CM

## 2024-06-08 PROCEDURE — 2500000001 HC RX 250 WO HCPCS SELF ADMINISTERED DRUGS (ALT 637 FOR MEDICARE OP): Performed by: STUDENT IN AN ORGANIZED HEALTH CARE EDUCATION/TRAINING PROGRAM

## 2024-06-08 PROCEDURE — 99284 EMERGENCY DEPT VISIT MOD MDM: CPT | Mod: 25

## 2024-06-08 PROCEDURE — 2500000001 HC RX 250 WO HCPCS SELF ADMINISTERED DRUGS (ALT 637 FOR MEDICARE OP)

## 2024-06-08 PROCEDURE — 2500000002 HC RX 250 W HCPCS SELF ADMINISTERED DRUGS (ALT 637 FOR MEDICARE OP, ALT 636 FOR OP/ED): Mod: MUE | Performed by: STUDENT IN AN ORGANIZED HEALTH CARE EDUCATION/TRAINING PROGRAM

## 2024-06-08 PROCEDURE — 2500000005 HC RX 250 GENERAL PHARMACY W/O HCPCS: Performed by: STUDENT IN AN ORGANIZED HEALTH CARE EDUCATION/TRAINING PROGRAM

## 2024-06-08 PROCEDURE — 76882 US LMTD JT/FCL EVL NVASC XTR: CPT | Performed by: STUDENT IN AN ORGANIZED HEALTH CARE EDUCATION/TRAINING PROGRAM

## 2024-06-08 PROCEDURE — 10060 I&D ABSCESS SIMPLE/SINGLE: CPT | Performed by: STUDENT IN AN ORGANIZED HEALTH CARE EDUCATION/TRAINING PROGRAM

## 2024-06-08 RX ORDER — LIDOCAINE HYDROCHLORIDE AND EPINEPHRINE 10; 10 MG/ML; UG/ML
5 INJECTION, SOLUTION INFILTRATION; PERINEURAL ONCE
Status: COMPLETED | OUTPATIENT
Start: 2024-06-08 | End: 2024-06-08

## 2024-06-08 RX ORDER — CEPHALEXIN 500 MG/1
500 CAPSULE ORAL 4 TIMES DAILY
Qty: 28 CAPSULE | Refills: 0 | Status: SHIPPED | OUTPATIENT
Start: 2024-06-08 | End: 2024-06-10 | Stop reason: WASHOUT

## 2024-06-08 RX ORDER — CEPHALEXIN 250 MG/1
500 CAPSULE ORAL ONCE
Status: COMPLETED | OUTPATIENT
Start: 2024-06-08 | End: 2024-06-08

## 2024-06-08 RX ORDER — SULFAMETHOXAZOLE AND TRIMETHOPRIM 800; 160 MG/1; MG/1
1 TABLET ORAL ONCE
Status: COMPLETED | OUTPATIENT
Start: 2024-06-08 | End: 2024-06-08

## 2024-06-08 RX ORDER — DIPHENHYDRAMINE HCL 25 MG
CAPSULE ORAL
Status: COMPLETED
Start: 2024-06-08 | End: 2024-06-08

## 2024-06-08 RX ORDER — SULFAMETHOXAZOLE AND TRIMETHOPRIM 800; 160 MG/1; MG/1
1 TABLET ORAL 2 TIMES DAILY
Qty: 14 TABLET | Refills: 0 | Status: SHIPPED | OUTPATIENT
Start: 2024-06-08 | End: 2024-06-15

## 2024-06-08 RX ADMIN — DIPHENHYDRAMINE HYDROCHLORIDE 50 MG: 25 CAPSULE ORAL at 20:05

## 2024-06-08 RX ADMIN — Medication 3 ML: at 19:41

## 2024-06-08 RX ADMIN — SULFAMETHOXAZOLE AND TRIMETHOPRIM 1 TABLET: 800; 160 TABLET ORAL at 19:43

## 2024-06-08 RX ADMIN — LIDOCAINE HYDROCHLORIDE,EPINEPHRINE BITARTRATE 5 ML: 10; .01 INJECTION, SOLUTION INFILTRATION; PERINEURAL at 19:41

## 2024-06-08 RX ADMIN — CEPHALEXIN 500 MG: 250 CAPSULE ORAL at 19:43

## 2024-06-08 ASSESSMENT — PAIN - FUNCTIONAL ASSESSMENT: PAIN_FUNCTIONAL_ASSESSMENT: 0-10

## 2024-06-08 ASSESSMENT — PAIN SCALES - GENERAL: PAINLEVEL_OUTOF10: 7

## 2024-06-08 ASSESSMENT — LIFESTYLE VARIABLES
TOTAL SCORE: 0
HAVE YOU EVER FELT YOU SHOULD CUT DOWN ON YOUR DRINKING: NO
EVER FELT BAD OR GUILTY ABOUT YOUR DRINKING: NO
HAVE PEOPLE ANNOYED YOU BY CRITICIZING YOUR DRINKING: NO
EVER HAD A DRINK FIRST THING IN THE MORNING TO STEADY YOUR NERVES TO GET RID OF A HANGOVER: NO

## 2024-06-08 ASSESSMENT — PAIN DESCRIPTION - LOCATION: LOCATION: FACE

## 2024-06-08 ASSESSMENT — PAIN DESCRIPTION - PAIN TYPE: TYPE: ACUTE PAIN

## 2024-06-08 ASSESSMENT — ENCOUNTER SYMPTOMS: PAIN: 1

## 2024-06-08 ASSESSMENT — PAIN DESCRIPTION - DESCRIPTORS: DESCRIPTORS: ACHING

## 2024-06-08 NOTE — ED PROCEDURE NOTE
Procedure    Performed by: Tremaine Rachel MD  Authorized by: Tremaine Rachel MD          Integumentary Indications: pain, redness and swelling        Procedure: Soft Tissue Ultrasound    Findings:  Fluid Collection: A FLUID COLLECTION was identified.  Cobblestoning: Cobblestoning was identified.  Color Doppler: NO abnormal color flow was identified.    Impression:  Soft Tissue: The soft tissue ultrasound exam had ABNORMAL findings as specified.                   Tremaine Rachel MD  06/08/24 1596

## 2024-06-08 NOTE — ED PROVIDER NOTES
HPI   Chief Complaint   Patient presents with    Abscess     C/O abscess and rash to chin that became worse yesterday.       HPI: Patient is a 38-year-old female who is presenting to the emergency department today for concern for facial abscess.  She has a history of MRSA infections, she was seen in urgent care a week ago and had a abscess drained on the right side of her chin, 24 hours ago developed swelling and pain to the left side of her chin.  Initially was the size of a pimple but then grew.  She has been trying warm compresses without any improvement, has not taken anything for pain, denies any lightheadedness, dizziness, chest pain, palpitations, shortness of breath.  Denies any intraoral pain or swelling.  She reports difficulty with her CPAP due to where the strap lays over this area of pain and swelling.      ROS: Complete 12 point review of systems performed, otherwise negative except as noted in the history of present illness    PMH: Reviewed, documented below in note. Pertinents in HPI  PSH: Reviewed and documented below in note. Pertinents in HPI  SH: No illicits, not homeless.  Fam: Reviewed, noncontributory to patients current complaint  MEDS: Reviewed and documented below in note. Pertinents in HPI  ALLERGIES: Reviewed and documented below in note.        History provided by:  Patient   used: No                          No data recorded                Patient History   Past Medical History:   Diagnosis Date    Achilles tendinitis, left leg 02/02/2023    Encounter for screening for malignant neoplasm of colon     Encounter for colonoscopy in patient with family history of colon cancer    Personal history of other diseases of the digestive system     History of acute pancreatitis    Personal history of other endocrine, nutritional and metabolic disease     History of type 1 diabetes mellitus    Type 2 diabetes mellitus (Multi) 02/02/2023     Past Surgical History:   Procedure  "Laterality Date    COLONOSCOPY      OTHER SURGICAL HISTORY  06/29/2022    No history of surgery     Family History   Problem Relation Name Age of Onset    Heart block Mother      Hypertension Mother      Lung disease Mother      Cancer Mother      Other (seasonal allergies) Mother      Other (cardiac disorder) Father      Hypertension Father      Lung disease Father      Cancer Father      Other (seasonal allergies) Father      Colon cancer Brother       Social History     Tobacco Use    Smoking status: Every Day     Current packs/day: 1.50     Types: Cigarettes    Smokeless tobacco: Never   Vaping Use    Vaping status: Former    Substances: Nicotine, Flavoring   Substance Use Topics    Alcohol use: Not Currently    Drug use: Never       Physical Exam   Visit Vitals  /83 (BP Location: Left arm, Patient Position: Sitting)   Pulse 90   Temp 36.1 °C (97 °F) (Tympanic)   Resp 20   Ht 1.676 m (5' 6\")   Wt 90.7 kg (200 lb)   LMP 07/16/2022   SpO2 98%   BMI 32.28 kg/m²   OB Status Hysterectomy   Smoking Status Every Day   BSA 2.05 m²      Physical Exam  Vitals and nursing note reviewed.   Constitutional:       Appearance: Normal appearance.   HENT:      Head: Normocephalic and atraumatic.      Comments: Area of induration, swelling, pain, redness to the L side of chin extending submandibullarly.     Nose: Nose normal.      Mouth/Throat:      Mouth: Mucous membranes are moist.      Pharynx: Oropharynx is clear. No oropharyngeal exudate or posterior oropharyngeal erythema.   Eyes:      Extraocular Movements: Extraocular movements intact.      Pupils: Pupils are equal, round, and reactive to light.   Neck:      Vascular: No carotid bruit.   Cardiovascular:      Rate and Rhythm: Normal rate and regular rhythm.      Pulses: Normal pulses.      Heart sounds: Normal heart sounds.   Pulmonary:      Effort: Pulmonary effort is normal.      Breath sounds: Normal breath sounds.   Musculoskeletal:      Cervical back: Normal " range of motion. No rigidity.   Skin:     General: Skin is warm and dry.      Capillary Refill: Capillary refill takes less than 2 seconds.      Findings: Erythema present.   Neurological:      General: No focal deficit present.      Mental Status: She is alert and oriented to person, place, and time.      Sensory: No sensory deficit.      Motor: No weakness.   Psychiatric:         Mood and Affect: Mood normal.         Behavior: Behavior normal.         Point of Care Ultrasound   Final Result          Labs Reviewed - No data to display      ED Course & MDM   Diagnoses as of 06/08/24 1956   Abscess   Cellulitis of face           Medical Decision Making  All mentioned lab results, ECGs, and imaging were independently reviewed by myself  - Patient evaluated. Patient presenting to the emergency department today for concern for a facial abscess.  Ultrasound performed does show a little bit of fluid collection with cobblestoning, concerning for underlying abscess and infection.  Incision and drainage was performed, patient tolerated procedure well, patient started on antibiotics.  No evidence of sepsis and therefore while labs and IV antibiotics were considered I do not believe are clinically indicated.  Patient was discharged with prescription for antibiotics and a dermatology referral was placed.    - Monitored for any changes in stability or symptomatology. Patient remained stable.   - Counseled regarding labs, imaging, diagnosis, and plan. Patient was agreeable. All questions were answered. The patient was receptive and agreeable to the plan of care.   -The patient was instructed to return to the emergency department if any symptoms recurred, worsened, or if there were any additional concerns.    *Disclaimer: This note was dictated by speech recognition. Minor errors in transcription may be present. Please call with questions.    Rashaun Rachel MD             Your medication list        START taking these  medications        Instructions Last Dose Given Next Dose Due   cephalexin 500 mg capsule  Commonly known as: Keflex      Take 1 capsule (500 mg) by mouth 4 times a day for 7 days.       sulfamethoxazole-trimethoprim 800-160 mg tablet  Commonly known as: Bactrim DS      Take 1 tablet by mouth 2 times a day for 7 days.              CONTINUE taking these medications        Instructions Last Dose Given Next Dose Due   blood-glucose meter misc  Commonly known as: OneTouch Ultra2 Meter      1 each 3 times a day.       lancets misc      1 each  in the morning and 1 each in the evening and 1 each before bedtime. As directed.       OneTouch Delica Plus Lancet 33 gauge misc  Generic drug: lancets      Test 3 times per day              ASK your doctor about these medications        Instructions Last Dose Given Next Dose Due   acetaminophen 500 mg tablet  Commonly known as: Tylenol           albuterol 90 mcg/actuation inhaler      Inhale 1-2 puffs every 6 hours if needed for wheezing.       azelastine 137 mcg (0.1 %) nasal spray  Commonly known as: Astelin           Basaglar KwikPen U-100 Insulin 100 unit/mL (3 mL) pen  Generic drug: insulin glargine      INJECT 40 UNITS UNDER THE SKIN ONCE DAILY IN THE MORNING.       Benadryl Allergy 25 mg tablet  Generic drug: diphenhydrAMINE           ibuprofen 800 mg tablet           Januvia 100 mg tablet  Generic drug: SITagliptin phosphate      TAKE 1 TABLET BY MOUTH DAILY       metFORMIN  mg 24 hr tablet  Commonly known as: Glucophage-XR      Take 1 tablet (500 mg) by mouth 2 times a day with meals. Do not crush, chew, or split.       omeprazole 40 mg DR capsule  Commonly known as: PriLOSEC      Take 1 capsule (40 mg) by mouth once daily in the morning. Take before meals.       OneTouch Ultra Test strip  Generic drug: blood sugar diagnostic      1 each 3 times a day.                 Where to Get Your Medications        These medications were sent to [facility][id number] -  [place], [place] - [address] AT [facility] [address] [facility]  [address], [place] [address]      Phone: [phone]   cephalexin 500 mg capsule  sulfamethoxazole-trimethoprim 800-160 mg tablet         Procedure  Incision and Drainage    Performed by: Tremaine Rachel MD  Authorized by: Tremaine Rachel MD    Consent:     Consent obtained:  Verbal    Consent given by:  Patient    Risks discussed:  Bleeding, incomplete drainage and pain    Alternatives discussed:  Observation  [place] protocol:     Patient identity confirmed:  Verbally with patient  Location:     Type:  Abscess    Size:  2.2cm x 1.2 cm    Location:  Head    Head location:  Face  Pre-procedure details:     Skin preparation:  Antiseptic wash  Sedation:     Sedation type:  None  Anesthesia:     Anesthesia method:  Topical application and local infiltration    Topical anesthetic:  LET    Local anesthetic:  Lidocaine 1% WITH epi  Procedure type:     Complexity:  Simple  Procedure details:     Ultrasound guidance: no      Incision types:  Stab incision    Incision depth:  Subcutaneous    Drainage:  Bloody    Drainage amount:  Scant    Wound treatment:  Wound left open    Packing materials:  None  Post-procedure details:     Procedure completion:  Tolerated well, no immediate complications       *This report was transcribed using voice recognition software.  Every effort was made to ensure accuracy; however, inadvertent computerized transcription errors may be present.*  Tremaine Rachel MD  06/08/24         Tremaine Rachel MD  06/08/24 1956

## 2024-06-08 NOTE — ED TRIAGE NOTES
Pt to ER with c/o rash and abscess to chin that became worse yesterday. Pt states she was seen at [facility] for the same complaint.

## 2024-06-09 ENCOUNTER — HOSPITAL ENCOUNTER (EMERGENCY)
Facility: HOSPITAL | Age: 39
Discharge: LEFT BEFORE DISCHARGE | End: 2024-06-09
Payer: MEDICARE

## 2024-06-09 VITALS
SYSTOLIC BLOOD PRESSURE: 122 MMHG | WEIGHT: 200 LBS | TEMPERATURE: 97.5 F | HEIGHT: 66 IN | HEART RATE: 88 BPM | BODY MASS INDEX: 32.14 KG/M2 | RESPIRATION RATE: 16 BRPM | DIASTOLIC BLOOD PRESSURE: 85 MMHG | OXYGEN SATURATION: 99 %

## 2024-06-09 PROCEDURE — 4500999001 HC ED NO CHARGE

## 2024-06-09 ASSESSMENT — PAIN DESCRIPTION - LOCATION: LOCATION: FACE

## 2024-06-09 ASSESSMENT — COLUMBIA-SUICIDE SEVERITY RATING SCALE - C-SSRS
6. HAVE YOU EVER DONE ANYTHING, STARTED TO DO ANYTHING, OR PREPARED TO DO ANYTHING TO END YOUR LIFE?: NO
1. IN THE PAST MONTH, HAVE YOU WISHED YOU WERE DEAD OR WISHED YOU COULD GO TO SLEEP AND NOT WAKE UP?: NO
2. HAVE YOU ACTUALLY HAD ANY THOUGHTS OF KILLING YOURSELF?: NO

## 2024-06-09 ASSESSMENT — PAIN DESCRIPTION - DESCRIPTORS: DESCRIPTORS: ACHING

## 2024-06-09 ASSESSMENT — PAIN SCALES - GENERAL: PAINLEVEL_OUTOF10: 8

## 2024-06-09 ASSESSMENT — PAIN - FUNCTIONAL ASSESSMENT: PAIN_FUNCTIONAL_ASSESSMENT: 0-10

## 2024-06-09 ASSESSMENT — PAIN DESCRIPTION - ORIENTATION: ORIENTATION: LEFT

## 2024-06-09 ASSESSMENT — PAIN DESCRIPTION - FREQUENCY: FREQUENCY: CONSTANT/CONTINUOUS

## 2024-06-09 NOTE — ED TRIAGE NOTES
Pt returning to the ED with increased L sided face pain. Pain now is radiating in to her ear.  Dx today with an access  on her L chin

## 2024-06-10 ENCOUNTER — OFFICE VISIT (OUTPATIENT)
Dept: PRIMARY CARE | Facility: CLINIC | Age: 39
End: 2024-06-10
Payer: MEDICARE

## 2024-06-10 VITALS
OXYGEN SATURATION: 97 % | BODY MASS INDEX: 33.59 KG/M2 | HEIGHT: 66 IN | TEMPERATURE: 97.9 F | DIASTOLIC BLOOD PRESSURE: 86 MMHG | SYSTOLIC BLOOD PRESSURE: 123 MMHG | WEIGHT: 209 LBS | HEART RATE: 59 BPM | RESPIRATION RATE: 16 BRPM

## 2024-06-10 DIAGNOSIS — E11.9 INSULIN-REQUIRING OR DEPENDENT TYPE II DIABETES MELLITUS (MULTI): ICD-10-CM

## 2024-06-10 DIAGNOSIS — E78.5 HYPERLIPIDEMIA, UNSPECIFIED HYPERLIPIDEMIA TYPE: ICD-10-CM

## 2024-06-10 DIAGNOSIS — F60.3 BORDERLINE PERSONALITY DISORDER (MULTI): ICD-10-CM

## 2024-06-10 DIAGNOSIS — F41.9 ANXIETY: ICD-10-CM

## 2024-06-10 DIAGNOSIS — F33.1 MODERATE EPISODE OF RECURRENT MAJOR DEPRESSIVE DISORDER (MULTI): ICD-10-CM

## 2024-06-10 DIAGNOSIS — Z79.4 INSULIN-REQUIRING OR DEPENDENT TYPE II DIABETES MELLITUS (MULTI): ICD-10-CM

## 2024-06-10 DIAGNOSIS — F31.9 BIPOLAR AFFECTIVE DISORDER, REMISSION STATUS UNSPECIFIED (MULTI): ICD-10-CM

## 2024-06-10 DIAGNOSIS — Z21 ASYMPTOMATIC HIV INFECTION, WITH NO HISTORY OF HIV-RELATED ILLNESS (MULTI): ICD-10-CM

## 2024-06-10 DIAGNOSIS — E11.65 POORLY CONTROLLED TYPE 2 DIABETES MELLITUS (MULTI): ICD-10-CM

## 2024-06-10 DIAGNOSIS — E11.9 TYPE 2 DIABETES MELLITUS WITHOUT COMPLICATION, UNSPECIFIED WHETHER LONG TERM INSULIN USE (MULTI): Primary | ICD-10-CM

## 2024-06-10 DIAGNOSIS — J45.909 ASTHMA, UNSPECIFIED ASTHMA SEVERITY, UNSPECIFIED WHETHER COMPLICATED, UNSPECIFIED WHETHER PERSISTENT (HHS-HCC): ICD-10-CM

## 2024-06-10 DIAGNOSIS — F43.10 PTSD (POST-TRAUMATIC STRESS DISORDER): ICD-10-CM

## 2024-06-10 PROCEDURE — 3049F LDL-C 100-129 MG/DL: CPT | Performed by: INTERNAL MEDICINE

## 2024-06-10 PROCEDURE — 4004F PT TOBACCO SCREEN RCVD TLK: CPT | Performed by: INTERNAL MEDICINE

## 2024-06-10 PROCEDURE — 3061F NEG MICROALBUMINURIA REV: CPT | Performed by: INTERNAL MEDICINE

## 2024-06-10 PROCEDURE — 3046F HEMOGLOBIN A1C LEVEL >9.0%: CPT | Performed by: INTERNAL MEDICINE

## 2024-06-10 PROCEDURE — 3074F SYST BP LT 130 MM HG: CPT | Performed by: INTERNAL MEDICINE

## 2024-06-10 PROCEDURE — 3079F DIAST BP 80-89 MM HG: CPT | Performed by: INTERNAL MEDICINE

## 2024-06-10 PROCEDURE — 3008F BODY MASS INDEX DOCD: CPT | Performed by: INTERNAL MEDICINE

## 2024-06-10 PROCEDURE — 99215 OFFICE O/P EST HI 40 MIN: CPT | Performed by: INTERNAL MEDICINE

## 2024-06-10 PROCEDURE — G2211 COMPLEX E/M VISIT ADD ON: HCPCS | Performed by: INTERNAL MEDICINE

## 2024-06-10 RX ORDER — BICTEGRAVIR SODIUM, EMTRICITABINE, AND TENOFOVIR ALAFENAMIDE FUMARATE 50; 200; 25 MG/1; MG/1; MG/1
1 TABLET ORAL
COMMUNITY
Start: 2024-03-29

## 2024-06-10 ASSESSMENT — LIFESTYLE VARIABLES
AUDIT-C TOTAL SCORE: 0
SKIP TO QUESTIONS 9-10: 1
HOW OFTEN DO YOU HAVE A DRINK CONTAINING ALCOHOL: NEVER
HOW OFTEN DO YOU HAVE SIX OR MORE DRINKS ON ONE OCCASION: NEVER
HOW MANY STANDARD DRINKS CONTAINING ALCOHOL DO YOU HAVE ON A TYPICAL DAY: PATIENT DOES NOT DRINK

## 2024-06-10 ASSESSMENT — PATIENT HEALTH QUESTIONNAIRE - PHQ9
7. TROUBLE CONCENTRATING ON THINGS, SUCH AS READING THE NEWSPAPER OR WATCHING TELEVISION: NOT AT ALL
SUM OF ALL RESPONSES TO PHQ9 QUESTIONS 1 & 2: 5
4. FEELING TIRED OR HAVING LITTLE ENERGY: MORE THAN HALF THE DAYS
6. FEELING BAD ABOUT YOURSELF - OR THAT YOU ARE A FAILURE OR HAVE LET YOURSELF OR YOUR FAMILY DOWN: MORE THAN HALF THE DAYS
9. THOUGHTS THAT YOU WOULD BE BETTER OFF DEAD, OR OF HURTING YOURSELF: NOT AT ALL
3. TROUBLE FALLING OR STAYING ASLEEP: MORE THAN HALF THE DAYS
5. POOR APPETITE OR OVEREATING: MORE THAN HALF THE DAYS
8. MOVING OR SPEAKING SO SLOWLY THAT OTHER PEOPLE COULD HAVE NOTICED. OR THE OPPOSITE, BEING SO FIGETY OR RESTLESS THAT YOU HAVE BEEN MOVING AROUND A LOT MORE THAN USUAL: SEVERAL DAYS
1. LITTLE INTEREST OR PLEASURE IN DOING THINGS: NEARLY EVERY DAY
2. FEELING DOWN, DEPRESSED OR HOPELESS: MORE THAN HALF THE DAYS
SUM OF ALL RESPONSES TO PHQ QUESTIONS 1-9: 14
10. IF YOU CHECKED OFF ANY PROBLEMS, HOW DIFFICULT HAVE THESE PROBLEMS MADE IT FOR YOU TO DO YOUR WORK, TAKE CARE OF THINGS AT HOME, OR GET ALONG WITH OTHER PEOPLE: VERY DIFFICULT

## 2024-06-10 NOTE — ASSESSMENT & PLAN NOTE
Patient admits that she often forgets her med therapy, will continue current meds including Januvia, compliance with meds may be the issues. Patient's current ability to care for self is concerning, will refer to clinical pharmacy for assessment of med issues. Refer to endocrinology for an assessment.

## 2024-06-10 NOTE — PROGRESS NOTES
"Chief Complaint/HPI:    IDT2DM: Patient has taken insulin since 12/2021. Patient is on dual-medication therapy for glycemic control. Patient is currently only on basal-insulin therapy, Glargine 40u in the morning. The patient takes Januvia, metformin also.  No reports of peripheral neuropathy. She was seen by GI () and was presenting with long-standing/chronic symptoms consistent with slow-transit constipation and IBS-C. She no longer uses MiraLAX. Patient does urinate frequently      HIV positive: Patient was seen at [facility], she was started on Biktarvy,  she follows up with ID now.      History of MRSA: Patient has lesions on the chin, she has prescription for Bactrim DS, she has not started it yet.      HLD: Patient apparently is not taking atorvastatin now, no recent lipid panel     Smoker: Patient still smokes about 1-2.5 ppd she admits     ADHD/Depression/PTSD/ bipolar disorder: patient has been homeless in the past she states, patient admits that she needs to see someone for therapy, she prefers talking to someone, she is not able to focus. Patient apparently took bupropion in the past, patient states that she overdosed on bupropion at age 15. Patient plans to move into a new residence, she is trying to find a place to \"go home to\", she will move into a  residence  pending an inspection     Uterine fibroid: patient had surgery completed, hysterectomy was done      ROS otherwise negative aside from what was mentioned above in HPI.      Patient Active Problem List   Diagnosis    Achilles bursitis of left lower extremity    Achilles tendinitis of left lower extremity    Acute bacterial bronchitis    Acute otitis media, bilateral    ADD (attention deficit disorder)    Allergic rhinitis    Back pain    Bilateral otitis externa    Chronic constipation    Colon polyp    Cough    Depression    Dermatitis, photoallergic    Dysuria    Hand pain    Hematuria    HLD (hyperlipidemia)    Insomnia    Insulin-requiring " or dependent type II diabetes mellitus (Multi)    Knee pain    Left foot pain    Leukocytes in urine    Lipoma    Nasal congestion    Nausea and vomiting    Paresthesias    Plantar fasciitis, bilateral    Post op infection    Postoperative pain    Pruritic rash    Tongue papillae hypertrophy    Poorly controlled type 2 diabetes mellitus (Multi)    URI, acute    Urinary symptom or sign    Uterine fibroid    Vaginal yeast infection    Class 1 obesity due to excess calories with body mass index (BMI) of 34.0 to 34.9 in adult    Abnormal uterine bleeding due to subserous leiomyoma of uterus    Rash    Allergic dermatitis    GERD (gastroesophageal reflux disease)    Other hyperlipidemia    Acute otitis media, left    Yeast infection    Type 2 diabetes mellitus without retinopathy (Multi)    Tobacco use disorder    Sleep apnea    PTSD (post-traumatic stress disorder)    Myopia with astigmatism and presbyopia, bilateral    Migraines    Legal intervention    History of herpes genitalis    History of Helicobacter pylori infection    Myoma    Fibromyalgia    Fatty liver    Cellulitis    Fibroids, subserous    Acute lower urinary tract infection    Borderline personality disorder (Multi)    Chronic fatigue    Dyspnea    Excessive daytime sleepiness    Leukocytosis    Noncompliance with medication regimen    Rectal hemorrhage    Recurrent major depressive disorder (CMS-HCC)    Rupture of biceps tendon    Tobacco dependence syndrome    Abscess    Acute upper respiratory infection    Obstructive sleep apnea syndrome    Asthma (HHS-HCC)    Erythema of back    Nausea    Migraine    Hemorrhoids    Hypersomnolence    Pain in upper limb    Pain in elbow    Headache    Contact dermatitis    Depressive disorder    Bipolar disorder (Multi)    Abdominal pain    Attention deficit hyperactivity disorder    Abscess of skin    Hypertensive disorder    Hearing loss    Disorder of skin    Disorder of bladder    Arthritis    Anxiety     Suprapubic pain    Carpal tunnel syndrome    Contact with and (suspected) exposure to covid-19    Altered bowel function    History of type 1 diabetes mellitus    Inflammatory disease of cervix uteri    Otitis externa    Severe obesity (Multi)    Leg laceration, left, initial encounter    Asymptomatic HIV infection, with no history of HIV-related illness (Multi)         Past Medical History:   Diagnosis Date    Achilles tendinitis, left leg 02/02/2023    Encounter for screening for malignant neoplasm of colon     Encounter for colonoscopy in patient with family history of colon cancer    Personal history of other diseases of the digestive system     History of acute pancreatitis    Personal history of other endocrine, nutritional and metabolic disease     History of type 1 diabetes mellitus    Type 2 diabetes mellitus (Multi) 02/02/2023     Past Surgical History:   Procedure Laterality Date    COLONOSCOPY      HYSTERECTOMY      OTHER SURGICAL HISTORY  06/29/2022    No history of surgery     Social History     Social History Narrative    Not on file         ALLERGIES  Duloxetine hcl, Hydrocodone-acetaminophen, Lanolin, Oxycodone-acetaminophen, Wool, and Morphine      MEDICATIONS  Current Outpatient Medications on File Prior to Visit   Medication Sig Dispense Refill    acetaminophen (Tylenol) 500 mg tablet Take 2 tablets (1,000 mg) by mouth every 6 hours.      azelastine (Astelin) 137 mcg (0.1 %) nasal spray Administer 2 sprays into affected nostril(s) once daily.      Biktarvy -25 mg tablet Take 1 tablet by mouth once daily.      blood-glucose meter (OneTouch Ultra2 Meter) misc 1 each 3 times a day. 1 each 0    diphenhydrAMINE (Benadryl Allergy) 25 mg tablet Take 1 tablet (25 mg) by mouth every 6 hours if needed for itching or allergies.      ibuprofen 800 mg tablet Take 1 tablet (800 mg) by mouth.      insulin glargine (Basaglar KwikPen U-100 Insulin) 100 unit/mL (3 mL) pen INJECT 40 UNITS UNDER THE SKIN ONCE  "DAILY IN THE MORNING. 36 mL 1    lancets misc 1 each  in the morning and 1 each in the evening and 1 each before bedtime. As directed. 90 each 3    metFORMIN XR (Glucophage-XR) 500 mg 24 hr tablet Take 1 tablet (500 mg) by mouth 2 times a day with meals. Do not crush, chew, or split. 180 tablet 3    OneTouch Delica Plus Lancet 33 gauge misc Test 3 times per day 100 each 6    OneTouch Ultra Test strip 1 each 3 times a day. 100 each 6    SITagliptin phosphate (Januvia) 100 mg tablet TAKE 1 TABLET BY MOUTH DAILY 90 tablet 3    sulfamethoxazole-trimethoprim (Bactrim DS) 800-160 mg tablet Take 1 tablet by mouth 2 times a day for 7 days. 14 tablet 0    omeprazole (PriLOSEC) 40 mg DR capsule Take 1 capsule (40 mg) by mouth once daily in the morning. Take before meals. 90 capsule 3    [DISCONTINUED] albuterol 90 mcg/actuation inhaler Inhale 1-2 puffs every 6 hours if needed for wheezing. 18 g 0    [DISCONTINUED] cephalexin (Keflex) 500 mg capsule Take 1 capsule (500 mg) by mouth 4 times a day for 7 days. (Patient not taking: Reported on 6/10/2024) 28 capsule 0     Current Facility-Administered Medications on File Prior to Visit   Medication Dose Route Frequency Provider Last Rate Last Admin    semaglutide (Ozempic) injection 0.25 mg  0.25 mg subcutaneous Once Hany Sabillon MD             PHYSICAL EXAM  /86 (BP Location: Right arm, Patient Position: Sitting, BP Cuff Size: Adult)   Pulse 59   Temp 36.6 °C (97.9 °F)   Resp 16   Ht 1.676 m (5' 6\")   Wt 94.8 kg (209 lb)   LMP 07/16/2022   SpO2 97%   BMI 33.73 kg/m²   Body mass index is 33.73 kg/m².  Constitutional   General appearance:  well developed, well nourished, obese , speech is rapid, A and O x 3  Eyes   Inspection of eyes: Sclera and conjunctiva were normal.   Ears, Nose, Mouth, and Throat   Ears: Auricles: Normal. No thyromegaly or neck masses, neck is tender to palpation  Pulmonary   Respiratory assessment: No respiratory distress, normal " "respiratory rhythm and effort.    Auscultation of Lungs: Clear bilateral breath sounds.   Cardiovascular   Auscultation of heart: Apical pulse normal, heart rate and rhythm normal, normal S1 and S2, no murmurs and no pericardial rub.    Exam for edema: No peripheral edema.   Skin   Patient has a an abrasion/ laceration on the chin.  Neurologic   Cranial nerves: Nerves 2-12 were intact, no focal neuro defects.   Psychiatric   Orientation: Oriented to person, place, and time.  Speech is pressured.   Mood and affect: Normal.         ASSESSMENT/PLAN  Problem List Items Addressed This Visit       HLD (hyperlipidemia)    Current Assessment & Plan     Takes no med therapy, she forgets to take her med therapy she admits          Relevant Orders    CBC and Auto Differential    Comprehensive Metabolic Panel    Lipid Panel    Insulin-requiring or dependent type II diabetes mellitus (Multi)    Current Assessment & Plan     Patient admits that she often forgets her med therapy, will continue current meds including Januvia, compliance with meds may be the issues. Patient's current ability to care for self is concerning, will refer to clinical pharmacy for assessment of med issues. Refer to endocrinology for an assessment.         Relevant Orders    Referral to Clinical Pharmacy    Referral to Endocrinology    CBC and Auto Differential    Comprehensive Metabolic Panel    Poorly controlled type 2 diabetes mellitus (Multi) - Primary    Relevant Orders    Referral to Clinical Pharmacy    Referral to Endocrinology    CBC and Auto Differential    Comprehensive Metabolic Panel    PTSD (post-traumatic stress disorder)    Overview     Last Assessment & Plan: Formatting of this note might be different from the original. Uncontrolled Has previously spoken with Brianne/Nadine but not noted to have spoken yet with Dr. Monge. Suspect large part of patients pain, anxiety, and frustration stems from this. Patient yet again stated that \"no " "one wants to help me\", however has failed to FU outside of [facility] [place] phone appointments. May benefit from therapy with Saleem if deemed appropriate.         Relevant Orders    Referral to Psychiatry    CBC and Auto Differential    Comprehensive Metabolic Panel    TSH with reflex to Free T4 if abnormal    Borderline personality disorder (Multi)    Relevant Orders    Referral to Psychiatry    CBC and Auto Differential    Comprehensive Metabolic Panel    Recurrent major depressive disorder (CMS-HCC)    Current Assessment & Plan     Patient continues to have issues with attention, focus, anxiety and depression. She talks to no one about current issues. Will refer to psychiatry for an evaluation. This may be the ongoing issue that is making other physical issues difficult to control. Patient states that she falls frequently also, concerned about a personality disorder also         Relevant Orders    Referral to Psychiatry    CBC and Auto Differential    Comprehensive Metabolic Panel    Asthma (HHS-HCC)    Relevant Orders    CBC and Auto Differential    Comprehensive Metabolic Panel    Bipolar disorder (Multi)    Overview     Psych         Relevant Orders    Referral to Psychiatry    CBC and Auto Differential    Comprehensive Metabolic Panel    Anxiety    Relevant Orders    Referral to Psychiatry    CBC and Auto Differential    Comprehensive Metabolic Panel    TSH with reflex to Free T4 if abnormal    Asymptomatic HIV infection, with no history of HIV-related illness (Multi)    Current Assessment & Plan     Continue follow up with HIV specialists in [place], continue Biktarvy         Relevant Orders    CBC and Auto Differential    Comprehensive Metabolic Panel     Labs ordered, ;patient will start Bactrim DS for facial rash  Refer to endocrinology, clinical pharmacy and psychiatry for assessment.    Follow up in 4 months  Hany Sabillon MD   "

## 2024-06-10 NOTE — ASSESSMENT & PLAN NOTE
Patient continues to have issues with attention, focus, anxiety and depression. She talks to no one about current issues. Will refer to psychiatry for an evaluation. This may be the ongoing issue that is making other physical issues difficult to control. Patient states that she falls frequently also, concerned about a personality disorder also

## 2024-06-17 ENCOUNTER — APPOINTMENT (OUTPATIENT)
Dept: DERMATOLOGY | Facility: CLINIC | Age: 39
End: 2024-06-17
Payer: MEDICARE

## 2024-06-17 DIAGNOSIS — L70.0 ACNE VULGARIS: Primary | ICD-10-CM

## 2024-06-17 PROCEDURE — 3008F BODY MASS INDEX DOCD: CPT | Performed by: DERMATOLOGY

## 2024-06-17 PROCEDURE — 3061F NEG MICROALBUMINURIA REV: CPT | Performed by: DERMATOLOGY

## 2024-06-17 PROCEDURE — 3046F HEMOGLOBIN A1C LEVEL >9.0%: CPT | Performed by: DERMATOLOGY

## 2024-06-17 PROCEDURE — 3049F LDL-C 100-129 MG/DL: CPT | Performed by: DERMATOLOGY

## 2024-06-17 PROCEDURE — 99204 OFFICE O/P NEW MOD 45 MIN: CPT | Performed by: DERMATOLOGY

## 2024-06-17 PROCEDURE — 4004F PT TOBACCO SCREEN RCVD TLK: CPT | Performed by: DERMATOLOGY

## 2024-06-17 RX ORDER — CEPHALEXIN 500 MG/1
CAPSULE ORAL
COMMUNITY
Start: 2024-06-10

## 2024-06-17 RX ORDER — DOXYCYCLINE HYCLATE 50 MG/1
CAPSULE ORAL
Qty: 30 CAPSULE | Refills: 2 | Status: SHIPPED | OUTPATIENT
Start: 2024-06-17

## 2024-06-17 RX ORDER — MUPIROCIN 20 MG/G
OINTMENT TOPICAL
Qty: 22 G | Refills: 0 | Status: SHIPPED | OUTPATIENT
Start: 2024-06-17

## 2024-06-17 NOTE — PROGRESS NOTES
Subjective     Drea Fajardo is a 38 y.o. female who presents for the following: Suspicious Skin Lesion (Pt presents to office for complaints of infected cysts.  Pt states she is getting cysts on the jawline that become inflamed and infected.).     Review of Systems:  No other skin or systemic complaints other than what is documented elsewhere in the note.    The following portions of the chart were reviewed this encounter and updated as appropriate:   Tobacco  Allergies  Meds  Problems  Med Hx  Surg Hx  Fam Hx         Skin Cancer History  No skin cancer on file.      Specialty Problems          Dermatology Problems    Dermatitis, photoallergic    Pruritic rash    Allergic dermatitis    Rash     CAME FOR RASH BEFORE, HERE FOR SAME REASON         Contact dermatitis    Disorder of skin    Erythema of back        Objective   Well appearing patient in no apparent distress; mood and affect are within normal limits.    A focused skin examination was performed. All findings within normal limits unless otherwise noted below.    Assessment/Plan   1. Acne vulgaris  Mid Submental Area  Scarring, excoriated deep seated, inflammatory papule on the L submental chin    Likely hormonal acne, per patient complicated by multiple MRSA infections  -No evidence of active infection on exam today  -Patient notes continued occurrences  -Discussed trial of low dose doxycyline once daily to see if this is preventative  -If needed, recommend spironolactone in the future  -Recommend treatment of nares with mupirocin given patient's report of numerous MRSA infections    Related Medications  mupirocin (Bactroban) 2 % ointment  Apply to the inside of the nostrils 3 times daily. Pinch shut for 10 seconds. Do this for 10 days.    doxycycline (Vibramycin) 50 mg capsule  Take one pill by by mouth once daily. Take with at least 8 ounces (large glass) of water, do not lie down for 30 minutes after        Follow up in 3 months for  acne  Discussed if there are any changes or development of concerning symptoms (lesion/skin condition is changing, bleeding, enlarging, or worsening) the patient is to contact my office. The patient verbalizes understanding.    Dilma Khalil MD  6/17/2024

## 2024-06-18 ENCOUNTER — APPOINTMENT (OUTPATIENT)
Dept: ENDOCRINOLOGY | Facility: CLINIC | Age: 39
End: 2024-06-18
Payer: MEDICARE

## 2024-06-18 VITALS
WEIGHT: 210 LBS | HEIGHT: 66 IN | HEART RATE: 91 BPM | BODY MASS INDEX: 33.75 KG/M2 | SYSTOLIC BLOOD PRESSURE: 113 MMHG | DIASTOLIC BLOOD PRESSURE: 72 MMHG

## 2024-06-18 DIAGNOSIS — E11.65 POORLY CONTROLLED TYPE 2 DIABETES MELLITUS (MULTI): Primary | ICD-10-CM

## 2024-06-18 DIAGNOSIS — Z79.4 INSULIN-REQUIRING OR DEPENDENT TYPE II DIABETES MELLITUS (MULTI): ICD-10-CM

## 2024-06-18 DIAGNOSIS — E11.9 INSULIN-REQUIRING OR DEPENDENT TYPE II DIABETES MELLITUS (MULTI): ICD-10-CM

## 2024-06-18 LAB — POC HEMOGLOBIN A1C: 10.2 % (ref 4.2–6.5)

## 2024-06-18 PROCEDURE — 83036 HEMOGLOBIN GLYCOSYLATED A1C: CPT | Performed by: NURSE PRACTITIONER

## 2024-06-18 PROCEDURE — 3061F NEG MICROALBUMINURIA REV: CPT | Performed by: NURSE PRACTITIONER

## 2024-06-18 PROCEDURE — 3078F DIAST BP <80 MM HG: CPT | Performed by: NURSE PRACTITIONER

## 2024-06-18 PROCEDURE — 99205 OFFICE O/P NEW HI 60 MIN: CPT | Performed by: NURSE PRACTITIONER

## 2024-06-18 PROCEDURE — 3074F SYST BP LT 130 MM HG: CPT | Performed by: NURSE PRACTITIONER

## 2024-06-18 PROCEDURE — 3046F HEMOGLOBIN A1C LEVEL >9.0%: CPT | Performed by: NURSE PRACTITIONER

## 2024-06-18 PROCEDURE — 3008F BODY MASS INDEX DOCD: CPT | Performed by: NURSE PRACTITIONER

## 2024-06-18 PROCEDURE — 3049F LDL-C 100-129 MG/DL: CPT | Performed by: NURSE PRACTITIONER

## 2024-06-18 PROCEDURE — 4004F PT TOBACCO SCREEN RCVD TLK: CPT | Performed by: NURSE PRACTITIONER

## 2024-06-18 RX ORDER — PEN NEEDLE, DIABETIC 30 GX3/16"
NEEDLE, DISPOSABLE MISCELLANEOUS
Qty: 100 EACH | Refills: 3 | Status: SHIPPED | OUTPATIENT
Start: 2024-06-18

## 2024-06-18 RX ORDER — INSULIN DEGLUDEC 100 U/ML
INJECTION, SOLUTION SUBCUTANEOUS
Qty: 45 ML | Refills: 3 | Status: SHIPPED | OUTPATIENT
Start: 2024-06-18

## 2024-06-18 RX ORDER — SEMAGLUTIDE 0.68 MG/ML
INJECTION, SOLUTION SUBCUTANEOUS
Qty: 9 ML | Refills: 3 | Status: SHIPPED | OUTPATIENT
Start: 2024-06-18 | End: 2025-07-18

## 2024-06-18 NOTE — PROGRESS NOTES
"Subjective   Patient is sent at the request of Dr. Sabillon for my opinion regarding .  My final recommendations will be communicated back to the requesting provider by way of shared medical record.     Dera Fajardo is a 38 y.o. female here today for a new patient visit regarding DM Type 2.    Known complications include: HTN, HPL, obesity, GERD   HIV+ - diagnosed 3/2024    Previously seeing PCP for diabetes care.    A1c 10.2% today.  Previous A1c 11.7% in 3/2024.     Here today to establish care  Newly diagnosed with HIV in 3/2024  Her focus was starting her HIV meds  She has put her diabetes meds on the back burner.    She is also having socioeconomic barriers  Does not have a place to live.    Has lost 100 lbs over the last year   Appetite back to normal   With stress, has nausea  Drinks coffee throughout the day   Mainly eats sausages for breakfast or bagel from [employer] if enough money  Main meal is dinner    Full with a couple bites      Historical meds:  Lantus- changed to Basaglar    Current diabetes regimen is as follows:   Januvia 100 mg once daily - has not taken recently   Metformin 500 mg twice daily - has not taken recently   Basaglar 40 units once daily  gets in 2/7 days     Patient is using continuous glucose monitor - previously used Hilary but fell off alot  The patient is not currently checking the blood glucose     Hypoglycemia frequency: denies  Hypoglycemia awareness: yes     The patient comes into the office today with hyperglycemia.        ROS   General: no fever, chills or acute changes in weight in the last 6 months  Skin: no rashes, pruritis or dry skin  Cardiac: denies chest pain, heart palpitations or orthopnea  Pulmonary: denies wheezing, productive cough or exertional dyspnea      Objective    Physical Exam  Blood pressure 113/72, pulse 91, height 1.676 m (5' 6\"), weight 95.3 kg (210 lb), last menstrual period 07/16/2022.  General: not in acute distress, cooperative "   Respiratory: normal respiratory effort  Musculoskeletal: normal gait       Current Outpatient Medications:     acetaminophen (Tylenol) 500 mg tablet, Take 2 tablets (1,000 mg) by mouth every 6 hours., Disp: , Rfl:     azelastine (Astelin) 137 mcg (0.1 %) nasal spray, Administer 2 sprays into affected nostril(s) once daily., Disp: , Rfl:     Biktarvy -25 mg tablet, Take 1 tablet by mouth once daily., Disp: , Rfl:     blood-glucose meter (OneTouch Ultra2 Meter) misc, 1 each 3 times a day., Disp: 1 each, Rfl: 0    cephalexin (Keflex) 500 mg capsule, , Disp: , Rfl:     diphenhydrAMINE (Benadryl Allergy) 25 mg tablet, Take 1 tablet (25 mg) by mouth every 6 hours if needed for itching or allergies., Disp: , Rfl:     doxycycline (Vibramycin) 50 mg capsule, Take one pill by by mouth once daily. Take with at least 8 ounces (large glass) of water, do not lie down for 30 minutes after, Disp: 30 capsule, Rfl: 2    ibuprofen 800 mg tablet, Take 1 tablet (800 mg) by mouth., Disp: , Rfl:     insulin glargine (Basaglar KwikPen U-100 Insulin) 100 unit/mL (3 mL) pen, INJECT 40 UNITS UNDER THE SKIN ONCE DAILY IN THE MORNING., Disp: 36 mL, Rfl: 1    lancets misc, 1 each  in the morning and 1 each in the evening and 1 each before bedtime. As directed., Disp: 90 each, Rfl: 3    metFORMIN XR (Glucophage-XR) 500 mg 24 hr tablet, Take 1 tablet (500 mg) by mouth 2 times a day with meals. Do not crush, chew, or split., Disp: 180 tablet, Rfl: 3    mupirocin (Bactroban) 2 % ointment, Apply to the inside of the nostrils 3 times daily. Pinch shut for 10 seconds. Do this for 10 days., Disp: 22 g, Rfl: 0    omeprazole (PriLOSEC) 40 mg DR capsule, Take 1 capsule (40 mg) by mouth once daily in the morning. Take before meals., Disp: 90 capsule, Rfl: 3    OneTouch Delica Plus Lancet 33 gauge misc, Test 3 times per day, Disp: 100 each, Rfl: 6    OneTouch Ultra Test strip, 1 each 3 times a day., Disp: 100 each, Rfl: 6    SITagliptin phosphate  (Januvia) 100 mg tablet, TAKE 1 TABLET BY MOUTH DAILY, Disp: 90 tablet, Rfl: 3    Current Facility-Administered Medications:     semaglutide (Ozempic) injection 0.25 mg, 0.25 mg, subcutaneous, Once, Hany Sabillon MD    Assessment/Plan   Type 2 diabetes with hyperglycemia with long-term use of insulin:  -A1c not at goal.  Currently not taking her oral diabetes medications.  She is often getting her long-acting insulin about 2 of 7 days/week.  Right now she is trying to focus on getting in her HIV medication.  Recommended taking metformin 2 tablets at dinnertime.  Would also recommend a longer acting basal insulin and gave her a sample of Tresiba today.  She does report some burning with her current glargine.  Overall socioeconomic and mental health barriers and caring for herself.  Currently she is about to start a new job and move into a new place.  Looking for psych resources.  Has resorted to the best of the past and this helped her with clarity and focus.  Without it she is having a hard time staying focused to care for herself.  Long history of PTSD.  Dexcom placed in office today without complications.  Need to simplify her medications.  For now add once weekly GLP1.  Samples provided in office today.  Short term follow up with PharmD    Plan:  Stop Basaglar  Replace Basaglar with Tresiba 40 units once daily     Stop Januvia      Start ozempic 0.25 mg once weekly x 4 weeks.    If tolerating then can increase to 0.5 mg once weekly     For now continue metformin  mg, 2 tablets daily with dinner      Take your Biktarvy in the morning      I am going to look into resources for psychology     Follow up in one month with PharmD     Follow up with me in 2-3 months virtually,  4-6 months in person with me     Addendum:   Referral placed for ACO.  Email sent inquiring about [facility]  Christiana Drake, JUAN CARLOS-CNP

## 2024-06-18 NOTE — PATIENT INSTRUCTIONS
Stop Basaglar  Replace Basaglar with Tresiba 40 units once daily     Stop Januvia      Start ozempic 0.25 mg once weekly x 4 weeks.    If tolerating then can increase to 0.5 mg once weekly     For now continue metformin  mg, 2 tablets daily with dinner      Take your Biktarvy in the morning      I am going to look into resources for psychology     Follow up in one month with PharmD     Follow up with me in 2-3 months virtually,  4-6 months in person with me

## 2024-06-19 ENCOUNTER — DOCUMENTATION (OUTPATIENT)
Dept: BEHAVIORAL HEALTH | Facility: CLINIC | Age: 39
End: 2024-06-19
Payer: MEDICARE

## 2024-06-26 ENCOUNTER — DOCUMENTATION (OUTPATIENT)
Dept: BEHAVIORAL HEALTH | Facility: CLINIC | Age: 39
End: 2024-06-26
Payer: MEDICARE

## 2024-06-26 NOTE — PROGRESS NOTES
Provider attempted to call patient about Personalized Care Program Referral. Provider unable to leave a VM due to patient's phone being turned off.

## 2024-07-09 ENCOUNTER — DOCUMENTATION (OUTPATIENT)
Dept: BEHAVIORAL HEALTH | Facility: CLINIC | Age: 39
End: 2024-07-09
Payer: MEDICARE

## 2024-07-09 NOTE — PROGRESS NOTES
Provider called patient on the phone regarding Personalized Care Program Referral from Christiana Drake. Provider left a VM for patient.

## 2024-07-15 ENCOUNTER — DOCUMENTATION (OUTPATIENT)
Dept: BEHAVIORAL HEALTH | Facility: CLINIC | Age: 39
End: 2024-07-15
Payer: MEDICARE

## 2024-07-16 ENCOUNTER — TELEMEDICINE (OUTPATIENT)
Dept: BEHAVIORAL HEALTH | Facility: CLINIC | Age: 39
End: 2024-07-16
Payer: MEDICARE

## 2024-07-16 DIAGNOSIS — Z79.4 INSULIN-REQUIRING OR DEPENDENT TYPE II DIABETES MELLITUS (MULTI): ICD-10-CM

## 2024-07-16 DIAGNOSIS — R41.840 ATTENTION AND CONCENTRATION DEFICIT: ICD-10-CM

## 2024-07-16 DIAGNOSIS — E11.9 INSULIN-REQUIRING OR DEPENDENT TYPE II DIABETES MELLITUS (MULTI): ICD-10-CM

## 2024-07-16 DIAGNOSIS — F17.200 TOBACCO USE DISORDER: ICD-10-CM

## 2024-07-16 DIAGNOSIS — F43.10 COMPLEX POSTTRAUMATIC STRESS DISORDER: ICD-10-CM

## 2024-07-16 DIAGNOSIS — F43.10 PTSD (POST-TRAUMATIC STRESS DISORDER): ICD-10-CM

## 2024-07-16 PROBLEM — F90.9 ATTENTION DEFICIT HYPERACTIVITY DISORDER: Status: ACTIVE | Noted: 2023-02-02

## 2024-07-16 PROBLEM — F33.9 RECURRENT MAJOR DEPRESSIVE DISORDER (CMS-HCC): Status: ACTIVE | Noted: 2023-02-02

## 2024-07-16 PROCEDURE — G2212 PROLONG OUTPT/OFFICE VIS: HCPCS | Performed by: STUDENT IN AN ORGANIZED HEALTH CARE EDUCATION/TRAINING PROGRAM

## 2024-07-16 PROCEDURE — 3046F HEMOGLOBIN A1C LEVEL >9.0%: CPT | Performed by: STUDENT IN AN ORGANIZED HEALTH CARE EDUCATION/TRAINING PROGRAM

## 2024-07-16 PROCEDURE — 3061F NEG MICROALBUMINURIA REV: CPT | Performed by: STUDENT IN AN ORGANIZED HEALTH CARE EDUCATION/TRAINING PROGRAM

## 2024-07-16 PROCEDURE — 99205 OFFICE O/P NEW HI 60 MIN: CPT | Performed by: STUDENT IN AN ORGANIZED HEALTH CARE EDUCATION/TRAINING PROGRAM

## 2024-07-16 PROCEDURE — 3049F LDL-C 100-129 MG/DL: CPT | Performed by: STUDENT IN AN ORGANIZED HEALTH CARE EDUCATION/TRAINING PROGRAM

## 2024-07-16 PROCEDURE — 3008F BODY MASS INDEX DOCD: CPT | Performed by: STUDENT IN AN ORGANIZED HEALTH CARE EDUCATION/TRAINING PROGRAM

## 2024-07-16 RX ORDER — TOPIRAMATE 25 MG/1
25 TABLET ORAL NIGHTLY
Qty: 30 TABLET | Refills: 0 | Status: SHIPPED | OUTPATIENT
Start: 2024-07-16 | End: 2024-08-15

## 2024-07-16 NOTE — ASSESSMENT & PLAN NOTE
Pending further collateral, this pre-existing diagnosis seems less likely. Past manic-like episodes were at most 5 days in length, were actually quite helpful functionally, and included daytime naps when there wasn't anything to do.

## 2024-07-16 NOTE — ASSESSMENT & PLAN NOTE
Having more difficulty managing while living out of her car.  - help support getting housing in 2 weeks  - dietitian to help with meal planning
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
## 2024-07-16 NOTE — Clinical Note
Chana - I think you are already aware - more details in my note Ivett - is anyone from community taking this case to help with social issues?
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
## 2024-07-16 NOTE — PATIENT INSTRUCTIONS
Remember to send me a draft of the letter you'd like for work support.  Try topiramate 25 nightly and see if you have any adverse effects.    Please send me a message in [facility] if things aren't going as expected or you are unsure about something we discussed. If this isn't working, you can call the psychiatry department line at [phone], or leave me a voicemail at [phone].  If you are having thoughts of harming yourself or ending your life, please call the national suicide hotline 24/7 at 748. For this and other mental health emergencies, such as losing touch with reality, call the [facility] 24/7 mobile crisis hotline at 779-131-4110, or dial 911 for emergency services.

## 2024-07-17 ENCOUNTER — PATIENT OUTREACH (OUTPATIENT)
Dept: CARE COORDINATION | Facility: CLINIC | Age: 39
End: 2024-07-17
Payer: MEDICAID

## 2024-07-17 NOTE — PROGRESS NOTES
Pt is a new Personalized Care pt ref to this RD for DM support. Spoke to pt today. Agreed to work with this RD for the above. Scheduled initial appt for 7/30 at 3p

## 2024-07-19 NOTE — PROGRESS NOTES
Provider received a call from patient who stated she had a couple more questions about the Personalized Care Program. Provider answered patient questions and confirmed appointment for the following day with Dr. Garcia.

## 2024-07-22 RX ORDER — BLOOD-GLUCOSE METER
EACH MISCELLANEOUS
COMMUNITY

## 2024-07-22 RX ORDER — DEXTROSE 4 G
TABLET,CHEWABLE ORAL
COMMUNITY

## 2024-07-22 RX ORDER — OMEPRAZOLE 40 MG/1
CAPSULE, DELAYED RELEASE ORAL
COMMUNITY

## 2024-07-22 RX ORDER — LANCETS 33 GAUGE
EACH MISCELLANEOUS
COMMUNITY

## 2024-07-22 RX ORDER — COVID-19 MOLECULAR TEST ASSAY
KIT MISCELLANEOUS
COMMUNITY

## 2024-07-23 ENCOUNTER — APPOINTMENT (OUTPATIENT)
Dept: ENDOCRINOLOGY | Facility: CLINIC | Age: 39
End: 2024-07-23
Payer: MEDICARE

## 2024-07-23 ENCOUNTER — TELEMEDICINE (OUTPATIENT)
Dept: BEHAVIORAL HEALTH | Facility: CLINIC | Age: 39
End: 2024-07-23
Payer: MEDICARE

## 2024-07-23 ENCOUNTER — TELEPHONE (OUTPATIENT)
Dept: PRIMARY CARE | Facility: CLINIC | Age: 39
End: 2024-07-23

## 2024-07-23 DIAGNOSIS — Z79.4 INSULIN-REQUIRING OR DEPENDENT TYPE II DIABETES MELLITUS (MULTI): ICD-10-CM

## 2024-07-23 DIAGNOSIS — F43.10 PTSD (POST-TRAUMATIC STRESS DISORDER): ICD-10-CM

## 2024-07-23 DIAGNOSIS — F17.200 TOBACCO USE DISORDER: ICD-10-CM

## 2024-07-23 DIAGNOSIS — F43.10 COMPLEX POSTTRAUMATIC STRESS DISORDER: ICD-10-CM

## 2024-07-23 DIAGNOSIS — R41.840 ATTENTION AND CONCENTRATION DEFICIT: ICD-10-CM

## 2024-07-23 DIAGNOSIS — E11.9 INSULIN-REQUIRING OR DEPENDENT TYPE II DIABETES MELLITUS (MULTI): ICD-10-CM

## 2024-07-23 PROCEDURE — 99215 OFFICE O/P EST HI 40 MIN: CPT | Performed by: STUDENT IN AN ORGANIZED HEALTH CARE EDUCATION/TRAINING PROGRAM

## 2024-07-23 PROCEDURE — 3049F LDL-C 100-129 MG/DL: CPT | Performed by: STUDENT IN AN ORGANIZED HEALTH CARE EDUCATION/TRAINING PROGRAM

## 2024-07-23 PROCEDURE — 3046F HEMOGLOBIN A1C LEVEL >9.0%: CPT | Performed by: STUDENT IN AN ORGANIZED HEALTH CARE EDUCATION/TRAINING PROGRAM

## 2024-07-23 PROCEDURE — 3061F NEG MICROALBUMINURIA REV: CPT | Performed by: STUDENT IN AN ORGANIZED HEALTH CARE EDUCATION/TRAINING PROGRAM

## 2024-07-23 RX ORDER — TOPIRAMATE 25 MG/1
25 TABLET ORAL 2 TIMES DAILY
Qty: 60 TABLET | Refills: 1 | Status: SHIPPED | OUTPATIENT
Start: 2024-07-23 | End: 2024-09-21

## 2024-07-23 NOTE — PATIENT INSTRUCTIONS
Therapist possibilities in [place]:  (You want trauma-informed DBT/ACT)    [facility]  [email or web address]    [facility]   [email or web address]    [facility] [employer]  [employer]    Please send me a message in [email or web address] if things aren't going as expected or you are unsure about something we discussed. If this isn't working, you can call the psychiatry department line at [phone], or leave me a voicemail at [phone].  If you are having thoughts of harming yourself or ending your life, please call the national suicide hotline 24/7 at 597. For this and other mental health emergencies, such as losing touch with reality, call the [facility] 24/7 mobile crisis hotline at 858-163-5162, or dial 911 for emergency services.
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
## 2024-07-23 NOTE — TELEPHONE ENCOUNTER
Patient would like an order placed for a sleep study. Patient would like to go to [facility] in [place]. Patient states that this is where she went before. Please advise.

## 2024-07-23 NOTE — ASSESSMENT & PLAN NOTE
Smokes 2 PPD, increasing lately in the context of stress caused by ex-boyfriend infidelity and breakup, HIV diagnosis, temporary homelessness. Has tried nicotine patches in the past.  - referral already made to cessation counseling with pharmacist

## 2024-07-23 NOTE — ASSESSMENT & PLAN NOTE
Having more difficulty managing while living out of her car.  - [facility] is providing support getting housing in 2 weeks  - dietitian scheduled to help with meal planning

## 2024-07-30 ENCOUNTER — APPOINTMENT (OUTPATIENT)
Dept: CARE COORDINATION | Facility: CLINIC | Age: 39
End: 2024-07-30

## 2024-07-30 ENCOUNTER — APPOINTMENT (OUTPATIENT)
Dept: PRIMARY CARE | Facility: CLINIC | Age: 39
End: 2024-07-30
Payer: MEDICARE

## 2024-07-30 NOTE — PROGRESS NOTES
"INITIAL NUTRITION EDUCATION VISIT    Reason for Nutrition Visit:  Pt is a 39 y.o. female being seen Virtual referred for  nutrition education and DM management    Visit Summary   Spoke to pt today. Is very upset over multiple things: 1) neuro appt being cancelled today by provider 2/2 \"neurologist doesn't even work in that office\", 2) hasn't received sensors for dexcom yet and doesn't have a glucometer anymore \"lost it in the move\" and 3) unable to find affordable housing despite reaching out and working with several agencies\".     Is currently living with her parents, eats out for every meal. States she is unable to use parents kitchen because her dad gets mad at her for using stove \"heats up the house\" and they don't have a microwave. Pt ref to eat what he cooks when he does cook \"my stomach can't rafael that stuff\". Dad mostly makes fried potatoes and hamburgers or cold sandwiches which she also states she can't tolerate.     Finds it hard affording food with the assistance she gets from disability and EBT card. 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 Is not open to working with this RD at this time on ways to limit eating out. Feels she can not do this until she gets a place of her own. This RD agreed to ref pt to [facility] for help with the above. Urged pt to reach out to this RD if she changes her mind and would like to work on diet/nutrition for her current living situation and pt agreed.       Past Medical Hx:  Patient Active Problem List   Diagnosis    Achilles bursitis of left lower extremity    Achilles tendinitis of left lower extremity    Acute bacterial bronchitis    Acute otitis media, bilateral    Allergic rhinitis    Back pain    Bilateral otitis externa    Chronic constipation    Colon polyp    Cough    Dermatitis, photoallergic    Dysuria    Hand pain    Hematuria    HLD (hyperlipidemia)    Insomnia    Insulin-requiring or dependent " type II diabetes mellitus (Multi)    Knee pain    Left foot pain    Leukocytes in urine    Lipoma    Nasal congestion    Nausea and vomiting    Paresthesias    Plantar fasciitis, bilateral    Post op infection    Postoperative pain    Pruritic rash    Tongue papillae hypertrophy    Poorly controlled type 2 diabetes mellitus (Multi)    URI, acute    Urinary symptom or sign    Uterine fibroid    Vaginal yeast infection    Class 1 obesity due to excess calories with body mass index (BMI) of 34.0 to 34.9 in adult    Abnormal uterine bleeding due to subserous leiomyoma of uterus    Rash    Allergic dermatitis    GERD (gastroesophageal reflux disease)    Other hyperlipidemia    Acute otitis media, left    Yeast infection    Type 2 diabetes mellitus without retinopathy (Multi)    Tobacco use disorder    Sleep apnea    Complex posttraumatic stress disorder    Myopia with astigmatism and presbyopia, bilateral    Migraines    Legal intervention    History of herpes genitalis    History of Helicobacter pylori infection    Myoma    Fibromyalgia    Fatty liver    Cellulitis    Fibroids, subserous    Acute lower urinary tract infection    Chronic fatigue    Dyspnea    Excessive daytime sleepiness    Leukocytosis    Noncompliance with medication regimen    Rectal hemorrhage    Recurrent major depressive disorder (CMS-HCC)    Rupture of biceps tendon    Abscess    Acute upper respiratory infection    Obstructive sleep apnea syndrome    Asthma (HHS-HCC)    Erythema of back    Nausea    Migraine    Hemorrhoids    Hypersomnolence    Pain in upper limb    Pain in elbow    Headache    Contact dermatitis    Bipolar disorder (Multi)    Abdominal pain    Attention and concentration deficit    Abscess of skin    Hypertensive disorder    Hearing loss    Disorder of skin    Disorder of bladder    Arthritis    Suprapubic pain    Carpal tunnel syndrome    Contact with and (suspected) exposure to covid-19    Altered bowel function    History of  type 1 diabetes mellitus    Inflammatory disease of cervix uteri    Otitis externa    Severe obesity (Multi)    Leg laceration, left, initial encounter    Asymptomatic HIV infection, with no history of HIV-related illness (Multi)        Current Medications:   Current Outpatient Medications on File Prior to Visit   Medication Sig Dispense Refill    acetaminophen (Tylenol) 500 mg tablet Take 2 tablets (1,000 mg) by mouth every 6 hours.      azelastine (Astelin) 137 mcg (0.1 %) nasal spray Administer 2 sprays into affected nostril(s) once daily.      Biktarvy -25 mg tablet Take 1 tablet by mouth once daily.      blood sugar diagnostic (OneTouch Verio test strips) strip       blood-glucose meter (OneTouch Ultra2 Meter) misc 1 each 3 times a day. 1 each 0    blood-glucose meter (OneTouch Verio Reflect Meter) misc       cephalexin (Keflex) 500 mg capsule       COVID-19 antigen test (BinaxNOW COVID-19 Ag Self Test) kit TEST AS DIRECTED TODAY      diphenhydrAMINE (Benadryl Allergy) 25 mg tablet Take 1 tablet (25 mg) by mouth every 6 hours if needed for itching or allergies.      doxycycline (Vibramycin) 50 mg capsule Take one pill by by mouth once daily. Take with at least 8 ounces (large glass) of water, do not lie down for 30 minutes after 30 capsule 2    duloxetine HCl (DULOXETINE ORAL)       ibuprofen 800 mg tablet Take 1 tablet (800 mg) by mouth.      insulin degludec (Tresiba FlexTouch U-100) 100 unit/mL (3 mL) injection Inject 40 units once daily 45 mL 3    lancets (OneTouch Delica Plus Lancet) 33 gauge misc       lancets misc 1 each  in the morning and 1 each in the evening and 1 each before bedtime. As directed. 90 each 3    metformin HCl (METFORMIN ORAL)       metFORMIN XR (Glucophage-XR) 500 mg 24 hr tablet Take 1 tablet (500 mg) by mouth 2 times a day with meals. Do not crush, chew, or split. 180 tablet 3    mupirocin (Bactroban) 2 % ointment Apply to the inside of the nostrils 3 times daily. Pinch shut  "for 10 seconds. Do this for 10 days. 22 g 0    omeprazole (PriLOSEC) 40 mg DR capsule Take 1 capsule (40 mg) by mouth once daily in the morning. Take before meals. 90 capsule 3    omeprazole (PriLOSEC) 40 mg DR capsule       OneTouch Delica Plus Lancet 33 gauge misc Test 3 times per day 100 each 6    OneTouch Ultra Test strip 1 each 3 times a day. 100 each 6    pen needle, diabetic 32 gauge x 5/32\" needle Use with insulin injection once daily 100 each 3    semaglutide (Ozempic) 0.25 mg or 0.5 mg (2 mg/3 mL) pen injector Inject 0.25 mg under the skin every 7 days for 30 days, THEN 0.5 mg every 7 days. 9 mL 3    SITagliptin phosphate (Januvia) 100 mg tablet TAKE 1 TABLET BY MOUTH DAILY 90 tablet 3    topiramate (Topamax) 25 mg tablet Take 1 tablet (25 mg) by mouth 2 times a day. 60 tablet 1     Current Facility-Administered Medications on File Prior to Visit   Medication Dose Route Frequency Provider Last Rate Last Admin    semaglutide (Ozempic) injection 0.25 mg  0.25 mg subcutaneous Once Hany Sabillon MD           Food & Nutrition Related History:  Dietary Considerations:  not assessed   Food Allergies: not assessed  Intolerance: not assessed     GI Symptoms : not assessed beyond what this RD stated above  Swallowing Difficulty: not assessed  Chewing not assessed  Food Preparation: Other - n/a pt eats out for each meal  Cooking Skills/Barriers: Inadequate kitchen appliances or supplies  Grocery Shopping: Patient  Participates in SNAP states she gives $100 or the $160 she gets/mo to her parents for rent  Supplements: not assessed      Sleep duration/quality : not assessed     24 Diet Recall:  Meal 1: not assessed   Meal 2:  Meal 3:    Snacks:  not assessed   Beverages: drinks coffee   Eating out:  for each meal every day   Alcohol Intake:  unknown   Self-Identified Challenges: see above    Physical Activity:   Not assessed     Labs:  Lab Results   Component Value Date    HGBA1C 10.2 (A) 06/18/2024    HGBA1C " "11.7 (H) 03/07/2024    HGBA1C 8.7 (A) 11/15/2022     (L) 03/07/2024    K 3.8 03/07/2024     03/07/2024    CO2 27 03/07/2024    BUN 13 03/07/2024    CREATININE 0.70 03/07/2024    CALCIUM 9.2 03/07/2024    ALBUMIN 3.5 03/07/2024    PROT 7.4 03/07/2024    BILITOT 0.5 03/07/2024    ALKPHOS 55 03/07/2024    ALT 15 03/07/2024    AST 15 03/07/2024    GLUCOSE 206 (H) 03/07/2024    BHYDRXBUT 0.29 (H) 01/13/2024     Lab Results   Component Value Date    CHOL 178 03/07/2024    LDLCALC 113 (H) 03/07/2024    TRIG 184 (H) 03/07/2024    HDL 28.5 03/07/2024    LDLF 92 06/30/2022        Comments:   above labs noted but not discussed with pt per the above    Anthropometrics:   Ht Readings from Last 1 Encounters:   06/18/24 1.676 m (5' 6\")      BMI Readings from Last 1 Encounters:   06/18/24 33.89 kg/m²      Wt Readings from Last 10 Encounters:   06/18/24 95.3 kg (210 lb)   06/10/24 94.8 kg (209 lb)   06/09/24 90.7 kg (200 lb)   06/08/24 90.7 kg (200 lb)   03/07/24 96.2 kg (212 lb)   01/13/24 99.8 kg (220 lb)   11/04/23 104 kg (230 lb)   11/02/23 104 kg (230 lb)   10/24/23 106 kg (234 lb)   08/29/23 108 kg (237 lb)      Weight change:  not reviewed per the above    Nutrition Diagnosis:  Diagnosis Statement 1:  Diagnosis Status: New  Diagnosis : Not ready for diet/lifestyle change related to perception that lack of resources prevent selection of food choices consistent with recommendations as evidenced by  pt not wishing to work with this RD until she can find stable housing     Diagnosis Statement 2:  Diagnosis Status: New  Diagnosis : Food Insecurity related to traumatic event(s) that causes a physical or psychological stress reaction as evidenced by  pt statements of running out of money before end of the month and not being able to grocery shop as she would like     Nutrition Interventions:  Provided nutrition education on the following topic(s):  was not able to provide edu at this time  using ACONutritionCounseling: " Motivational Interviewing  Referred pt to Coordination of Care: Behavioral Health Specialist and  PC LSW   Discussed with pt? Yes  Provided handouts on: n/a    Nutrition Goals:  Nutrition Goals : no goals at this time      Readiness to Change : Poor    Follow up Plan  No need for immediate follow-up at this time

## 2024-07-31 ENCOUNTER — PATIENT OUTREACH (OUTPATIENT)
Dept: CARE COORDINATION | Facility: CLINIC | Age: 39
End: 2024-07-31
Payer: MEDICAID

## 2024-07-31 NOTE — PROGRESS NOTES
Spoke to pt briefly. Updated pt that Personalized Care program LSW and Community Health Worker will be reaching out to her in the coming days to assist with housing needs and DM supply needs. Pt appreciative of communication and assistance.

## 2024-08-04 DIAGNOSIS — E11.9 INSULIN-REQUIRING OR DEPENDENT TYPE II DIABETES MELLITUS (MULTI): ICD-10-CM

## 2024-08-04 DIAGNOSIS — Z79.4 INSULIN-REQUIRING OR DEPENDENT TYPE II DIABETES MELLITUS (MULTI): ICD-10-CM

## 2024-08-05 RX ORDER — SITAGLIPTIN 100 MG/1
TABLET, FILM COATED ORAL
Qty: 90 TABLET | Refills: 3 | Status: SHIPPED | OUTPATIENT
Start: 2024-08-05

## 2024-08-14 NOTE — PROGRESS NOTES
Patient is sent at the request of Christiana Drake, APR* for my opinion regarding Type 2 diabetes.  My recommendations below will be communicated back to the requesting provider by way of shared medical record.    Recommendations:  Continue current medications as above d/t lack of BG data  Glucose tablet order sent to pharmacy  ________________________________________________________________________    Subjective   Past Medical History:  Patient Active Problem List   Diagnosis    Achilles bursitis of left lower extremity    Achilles tendinitis of left lower extremity    Acute bacterial bronchitis    Acute otitis media, bilateral    Allergic rhinitis    Back pain    Bilateral otitis externa    Chronic constipation    Colon polyp    Cough    Dermatitis, photoallergic    Dysuria    Hand pain    Hematuria    HLD (hyperlipidemia)    Insomnia    Insulin-requiring or dependent type II diabetes mellitus (Multi)    Knee pain    Left foot pain    Leukocytes in urine    Lipoma    Nasal congestion    Nausea and vomiting    Paresthesias    Plantar fasciitis, bilateral    Post op infection    Postoperative pain    Pruritic rash    Tongue papillae hypertrophy    Poorly controlled type 2 diabetes mellitus (Multi)    URI, acute    Urinary symptom or sign    Uterine fibroid    Vaginal yeast infection    Class 1 obesity due to excess calories with body mass index (BMI) of 34.0 to 34.9 in adult    Abnormal uterine bleeding due to subserous leiomyoma of uterus    Rash    Allergic dermatitis    GERD (gastroesophageal reflux disease)    Other hyperlipidemia    Acute otitis media, left    Yeast infection    Type 2 diabetes mellitus without retinopathy (Multi)    Tobacco use disorder    Sleep apnea    Complex posttraumatic stress disorder    Myopia with astigmatism and presbyopia, bilateral    Migraines    Legal intervention    History of herpes genitalis    History of Helicobacter pylori infection    Myoma    Fibromyalgia    Fatty liver     Cellulitis    Fibroids, subserous    Acute lower urinary tract infection    Chronic fatigue    Dyspnea    Excessive daytime sleepiness    Leukocytosis    Noncompliance with medication regimen    Rectal hemorrhage    Recurrent major depressive disorder (CMS-HCC)    Rupture of biceps tendon    Abscess    Acute upper respiratory infection    Obstructive sleep apnea syndrome    Asthma (HHS-HCC)    Erythema of back    Nausea    Migraine    Hemorrhoids    Hypersomnolence    Pain in upper limb    Pain in elbow    Headache    Contact dermatitis    Bipolar disorder (Multi)    Abdominal pain    Attention and concentration deficit    Abscess of skin    Hypertensive disorder    Hearing loss    Disorder of skin    Disorder of bladder    Arthritis    Suprapubic pain    Carpal tunnel syndrome    Contact with and (suspected) exposure to covid-19    Altered bowel function    History of type 1 diabetes mellitus    Inflammatory disease of cervix uteri    Otitis externa    Severe obesity (Multi)    Leg laceration, left, initial encounter    Asymptomatic HIV infection, with no history of HIV-related illness (Multi)     Interim:  Drea Fajardo is a 39 y.o. female presents today for new patient visit with ayaka Barry for Type 2 Diabetes Mellitus. Last seen by Christiana Drake on 6/18 where Ozempic was started, Januvia stopped, basaglar switched to Tresiba, and a Dexcom sensor was placed. In the interim, pt has had visits w/ behavioral health d/t issues w/ housing, as well as with RD however did not feel ready to make dietary changes d/t current socioeconomic factors.    Today pt contacted PharmD d/t waking up late and not being able to make scheduled appt time. Author talked w/ pt and she states she has not had Dexcom for a number of weeks. Otherwise states she is tolerating Ozempic well, and denies acute concerns.    Diabetes Pharmacotherapy:    Tresiba 40 units once daily   Ozempic 0.5 mg once weekly  Metformin  mg, 2  tablets daily with dinner      Previously trialed meds:   Januvia - switched to Ozempic  Basaglar - switched to Tresiba    Allergies:  Duloxetine hcl, Hydrocodone-acetaminophen, Lanolin, Oxycodone-acetaminophen, Wool, and Morphine    Medication list:  Current Outpatient Medications   Medication Instructions    acetaminophen (TYLENOL) 1,000 mg, oral, Every 6 hours    azelastine (Astelin) 137 mcg (0.1 %) nasal spray 2 sprays, nasal, Daily RT    Biktarvy -25 mg tablet 1 tablet, oral, Daily RT    blood sugar diagnostic (OneTouch Verio test strips) strip     blood-glucose meter (OneTouch Ultra2 Meter) misc 1 each, miscellaneous, 3 times daily    blood-glucose meter (OneTouch Verio Reflect Meter) misc     cephalexin (Keflex) 500 mg capsule     COVID-19 antigen test (BinaxNOW COVID-19 Ag Self Test) kit TEST AS DIRECTED TODAY    diphenhydrAMINE (BENADRYL ALLERGY) 25 mg, oral, Every 6 hours PRN    doxycycline (Vibramycin) 50 mg capsule Take one pill by by mouth once daily. Take with at least 8 ounces (large glass) of water, do not lie down for 30 minutes after    duloxetine HCl (DULOXETINE ORAL)     ibuprofen 800 mg, oral    insulin degludec (Tresiba FlexTouch U-100) 100 unit/mL (3 mL) injection Inject 40 units once daily    Januvia 100 mg tablet TAKE 1 TABLET BY MOUTH DAILY    lancets (OneTouch Delica Plus Lancet) 33 gauge misc     lancets misc 1 each, miscellaneous, 3 times daily, As directed    metformin HCl (METFORMIN ORAL)     metFORMIN XR (GLUCOPHAGE-XR) 500 mg, oral, 2 times daily (morning and late afternoon), Do not crush, chew, or split.    mupirocin (Bactroban) 2 % ointment Apply to the inside of the nostrils 3 times daily. Pinch shut for 10 seconds. Do this for 10 days.    omeprazole (PriLOSEC) 40 mg DR capsule     omeprazole (PRILOSEC) 40 mg, oral, Daily before breakfast    OneTouch Delica Plus Lancet 33 gauge misc Test 3 times per day    OneTouch Ultra Test strip 1 each, miscellaneous, 3 times daily    pen  "needle, diabetic 32 gauge x 5/32\" needle Use with insulin injection once daily    semaglutide (Ozempic) 0.25 mg or 0.5 mg (2 mg/3 mL) pen injector Inject 0.25 mg under the skin every 7 days for 30 days, THEN 0.5 mg every 7 days.    topiramate (TOPAMAX) 25 mg, oral, 2 times daily        Objective   Last Recorded Vitals:  BP Readings from Last 3 Encounters:   06/18/24 113/72   06/10/24 123/86   06/09/24 122/85     Wt Readings from Last 3 Encounters:   06/18/24 95.3 kg (210 lb)   06/10/24 94.8 kg (209 lb)   06/09/24 90.7 kg (200 lb)     BMI Readings from Last 1 Encounters:   06/18/24 33.89 kg/m²      Labs  A1C  Lab Results   Component Value Date    HGBA1C 10.2 (A) 06/18/2024    HGBA1C 11.7 (H) 03/07/2024    HGBA1C 8.7 (A) 11/15/2022     BMP/LFTs  Lab Results   Component Value Date    CALCIUM 9.2 03/07/2024     (L) 03/07/2024    K 3.8 03/07/2024    CO2 27 03/07/2024     03/07/2024    BUN 13 03/07/2024    CREATININE 0.70 03/07/2024    EGFR >90 03/07/2024    ALT 15 03/07/2024    AST 15 03/07/2024    ALKPHOS 55 03/07/2024    BILITOT 0.5 03/07/2024     Lipids  Lab Results   Component Value Date    TRIG 184 (H) 03/07/2024    CHOL 178 03/07/2024    LDLF 92 06/30/2022    LDLCALC 113 (H) 03/07/2024    HDL 28.5 03/07/2024     Urine Microalbumin  Lab Results   Component Value Date    MICROALBCREA 9.8 03/07/2024       Home glucose monitoring:  Hypoglycemia:  reports 1-2 potential lows while at work d/t increased activity  Only had 1 dexcom sensor from visit with endo, has not had since      Assessment/Plan   Type 2 Diabetes Mellitus  Goal A1C <6.5%, well above goal as of 6/2024. No home BG data to review, however pt states she feels she may have had 1-2 lows while at work d/t being more active. Major barrier today was pt running out of Dexcom sensors, and purpose of in person visit was to download Dexcom reader (cannot upload from home). Discussed w/ pt, who requests f/u  in one week to review Dexcom data, will not " make changes today d/t lack of data.  Plan:  Continue current medications as above d/t lack of BG data  Glucose tablet order sent to pharmacy  Home glucose monitoring:   Will continue Dexcom G7, sensors sent to pharmacy today  Hyperlipidemia:   Therapy: None  LDL result does not yet meet goal, liver functions are normal, age <40  Renal:  CKD: stage 1 - normal function  ACR: <30  Renal protective agents: GLP1  DM medications are dosed appropriately for renal function  Labs: up to date  PharmD follow-up: 1 week to review CGM data  Endo follow-up: 9/12/24    Patient agreeable to plan as above, contact information provided for any future questions or concerns.    Dong Plummer, PharmD    Type of encounter: virtual  Provider on site: Christiana Drake    Continue all meds under the continuation of care with the referring provider and clinical pharmacy team.

## 2024-08-19 ENCOUNTER — TELEMEDICINE (OUTPATIENT)
Dept: BEHAVIORAL HEALTH | Facility: CLINIC | Age: 39
End: 2024-08-19
Payer: MEDICARE

## 2024-08-19 DIAGNOSIS — Z79.4 INSULIN-REQUIRING OR DEPENDENT TYPE II DIABETES MELLITUS (MULTI): ICD-10-CM

## 2024-08-19 DIAGNOSIS — E11.9 INSULIN-REQUIRING OR DEPENDENT TYPE II DIABETES MELLITUS (MULTI): ICD-10-CM

## 2024-08-19 DIAGNOSIS — F43.10 PTSD (POST-TRAUMATIC STRESS DISORDER): ICD-10-CM

## 2024-08-19 DIAGNOSIS — R41.840 ATTENTION AND CONCENTRATION DEFICIT: ICD-10-CM

## 2024-08-19 DIAGNOSIS — F17.200 TOBACCO USE DISORDER: ICD-10-CM

## 2024-08-19 DIAGNOSIS — F43.10 COMPLEX POSTTRAUMATIC STRESS DISORDER: ICD-10-CM

## 2024-08-19 PROBLEM — L70.9 ACNE, UNSPECIFIED: Status: ACTIVE | Noted: 2024-06-07

## 2024-08-19 PROCEDURE — 3061F NEG MICROALBUMINURIA REV: CPT | Performed by: STUDENT IN AN ORGANIZED HEALTH CARE EDUCATION/TRAINING PROGRAM

## 2024-08-19 PROCEDURE — 3046F HEMOGLOBIN A1C LEVEL >9.0%: CPT | Performed by: STUDENT IN AN ORGANIZED HEALTH CARE EDUCATION/TRAINING PROGRAM

## 2024-08-19 PROCEDURE — 3049F LDL-C 100-129 MG/DL: CPT | Performed by: STUDENT IN AN ORGANIZED HEALTH CARE EDUCATION/TRAINING PROGRAM

## 2024-08-19 PROCEDURE — 99215 OFFICE O/P EST HI 40 MIN: CPT | Performed by: STUDENT IN AN ORGANIZED HEALTH CARE EDUCATION/TRAINING PROGRAM

## 2024-08-19 RX ORDER — TOPIRAMATE 50 MG/1
50 TABLET, FILM COATED ORAL 2 TIMES DAILY
Qty: 60 TABLET | Refills: 1 | Status: SHIPPED | OUTPATIENT
Start: 2024-08-19 | End: 2024-10-18

## 2024-08-19 NOTE — ASSESSMENT & PLAN NOTE
Was having more difficulty managing while living out of her car. Need updated A1c, already ordered.  - get labs once enough gas money  - dietitian scheduled to help with meal planning
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
## 2024-08-20 ENCOUNTER — APPOINTMENT (OUTPATIENT)
Dept: ENDOCRINOLOGY | Facility: CLINIC | Age: 39
End: 2024-08-20
Payer: MEDICARE

## 2024-08-20 ENCOUNTER — APPOINTMENT (OUTPATIENT)
Dept: ENDOCRINOLOGY | Facility: CLINIC | Age: 39
End: 2024-08-20
Payer: MEDICAID

## 2024-08-20 DIAGNOSIS — E11.9 INSULIN-REQUIRING OR DEPENDENT TYPE II DIABETES MELLITUS (MULTI): Primary | ICD-10-CM

## 2024-08-20 DIAGNOSIS — E11.65 POORLY CONTROLLED TYPE 2 DIABETES MELLITUS (MULTI): ICD-10-CM

## 2024-08-20 DIAGNOSIS — Z79.4 INSULIN-REQUIRING OR DEPENDENT TYPE II DIABETES MELLITUS (MULTI): Primary | ICD-10-CM

## 2024-08-20 RX ORDER — BLOOD-GLUCOSE SENSOR
EACH MISCELLANEOUS
Qty: 9 EACH | Refills: 3 | Status: SHIPPED | OUTPATIENT
Start: 2024-08-20

## 2024-08-20 RX ORDER — IBUPROFEN 200 MG
16 TABLET ORAL AS NEEDED
Qty: 40 TABLET | Refills: 11 | Status: SHIPPED | OUTPATIENT
Start: 2024-08-20 | End: 2025-08-20

## 2024-08-21 ENCOUNTER — TELEPHONE (OUTPATIENT)
Dept: ENDOCRINOLOGY | Facility: CLINIC | Age: 39
End: 2024-08-21
Payer: COMMERCIAL

## 2024-08-21 NOTE — TELEPHONE ENCOUNTER
Patient saw pharmacist yesterday.  Needs a letter stating she needs to check her blood sugar at work.  Letter sent via my chart

## 2024-08-21 NOTE — LETTER
August 21, 2024       Patient: Drea Fajardo   YOB: 1985   Date of Visit: 8/21/2024       RE: Accommodations for Diabetes under the Americans with Disabilities Act  To Whom It May Concern:    Please Note: Drea Fajardo is a 39 year old female with a known history of a significant chronic health condition that may impair activities of daily living and work known as Type 2 Diabetes.   This condition requires administration of medication:  injectables, specific dietary intake specifications, and frequent medical provider evaluation, and need for necessary adjustments in medication, diet, and or lifestyle.     She is currently treated with insulin designed to reduce the blood sugar value and as a result may cause low blood sugars, known as hypoglycemia.  Because of this condition and medication response she requires frequent evaluations per day of her health status, including checking and or treating her blood sugar values.     In order to maximize her performance level that will not jeopardize her health status, please understand that some accommodations and modifications are required.        These include but are not limited to the following:    -  Monitoring of blood sugar values with appropriate treatment of the blood sugar if needed.  This may include having her phone to access her blood sugar reading.         -  Diabetes management requires that she be able to take frequent breaks when the need arises.      -  Time allowed for but not limited to:    1- Check blood glucose levels.  Treat abnormal blood glucose levels (immediately).     Therefore, supervised / pre - scheduled breaks may not correctly accommodate the acute physiologic condition when it occurs.  This may require an extended period of time to correct the blood sugar whether high or low.    2- Treat low blood sugars with either food and or beverage.  These items need to be available in their office area.      3- Use the restroom  when needed and/or    4- In compliance with HIPAA confidentiality mandates, permission for the sharing of this personal health information has been obtained by the patient, and as such this letter should be treated as highly confidential records and not shared without the patient's permission.       If you have any questions about my request, you are welcome to contact me by using the above letterhead contact information.      Sincerely,     JUAN CARLOS Perales-CNP      CC: No Recipients  ______________________________________________________________________________________

## 2024-08-28 ENCOUNTER — APPOINTMENT (OUTPATIENT)
Dept: ENDOCRINOLOGY | Facility: CLINIC | Age: 39
End: 2024-08-28
Payer: MEDICARE

## 2024-08-30 ENCOUNTER — TELEPHONE (OUTPATIENT)
Dept: ENDOCRINOLOGY | Facility: CLINIC | Age: 39
End: 2024-08-30
Payer: COMMERCIAL

## 2024-08-30 ENCOUNTER — TELEPHONE (OUTPATIENT)
Dept: PRIMARY CARE | Facility: CLINIC | Age: 39
End: 2024-08-30
Payer: COMMERCIAL

## 2024-08-30 DIAGNOSIS — E11.65 POORLY CONTROLLED TYPE 2 DIABETES MELLITUS (MULTI): ICD-10-CM

## 2024-08-30 RX ORDER — BLOOD-GLUCOSE METER
1 EACH MISCELLANEOUS DAILY
Qty: 100 STRIP | Refills: 3 | Status: SHIPPED | OUTPATIENT
Start: 2024-08-30

## 2024-08-30 RX ORDER — BLOOD-GLUCOSE CONTROL, NORMAL
1 EACH MISCELLANEOUS AS NEEDED
Qty: 100 EACH | Refills: 3 | Status: SHIPPED | OUTPATIENT
Start: 2024-08-30

## 2024-08-30 RX ORDER — PEN NEEDLE, DIABETIC 30 GX3/16"
NEEDLE, DISPOSABLE MISCELLANEOUS
Qty: 100 EACH | Refills: 3 | Status: SHIPPED | OUTPATIENT
Start: 2024-08-30

## 2024-08-30 RX ORDER — DEXTROSE 4 G
1 TABLET,CHEWABLE ORAL AS NEEDED
Qty: 1 EACH | Refills: 0 | Status: SHIPPED | OUTPATIENT
Start: 2024-08-30

## 2024-08-30 NOTE — TELEPHONE ENCOUNTER
Demanding a call from Dr. SCHULZ personally. She said it is personal and would not go into details. I repeated that he would want to know what the call is about and she refused to tell me.

## 2024-08-30 NOTE — PROGRESS NOTES
Patient called author stating that she has not been able to use her insulin in 4-5 days d/t being out of pen needles. Denies s/sx of DKA however is reporting signs of hyperglycemia (fatigue, polydipsia, polyuria, polyphagia). Also reports she does not have a glucometer at home so she does not know what her Bgs have been. States that the pharmacy told the patient she needs a PA for Dexcom G7. Lastly, pt no-showed Wednesday visit with author, but is willing to reschedule.    Plan:  Orders for pen needles and glucometer/testing supplies sent to pharmacy  Will alert team regarding PA for dexcom  Reviewed s/sx of DKA and when to seek emergency treatment  Seeing endo in ~2 weeks, will reschedule with author in 6 weeks

## 2024-08-30 NOTE — TELEPHONE ENCOUNTER
Initiated order for TMS CGM through [facility] by sending a staff message to our contact there at [facility]. They will pull notes and any other pertinent information. Our contact will then send the form for us via email to sign electronically.

## 2024-09-04 ENCOUNTER — APPOINTMENT (OUTPATIENT)
Dept: PHARMACY | Facility: HOSPITAL | Age: 39
End: 2024-09-04
Payer: MEDICAID

## 2024-09-11 ENCOUNTER — DOCUMENTATION (OUTPATIENT)
Dept: BEHAVIORAL HEALTH | Facility: CLINIC | Age: 39
End: 2024-09-11
Payer: COMMERCIAL

## 2024-09-11 NOTE — PROGRESS NOTES
Provider called patient about completing the PEN-13 survey for the Personalized Care Program. Provider left a VM.

## 2024-09-12 ENCOUNTER — TELEPHONE (OUTPATIENT)
Dept: ENDOCRINOLOGY | Facility: CLINIC | Age: 39
End: 2024-09-12

## 2024-09-12 ENCOUNTER — APPOINTMENT (OUTPATIENT)
Dept: ENDOCRINOLOGY | Facility: CLINIC | Age: 39
End: 2024-09-12
Payer: MEDICARE

## 2024-09-12 NOTE — TELEPHONE ENCOUNTER
CRM in system stating:    Patient called in needs to speak with the office her rx not sure if insurance denied it or need prior auth for her meds please call patient [phone]

## 2024-09-13 ENCOUNTER — DOCUMENTATION (OUTPATIENT)
Dept: BEHAVIORAL HEALTH | Facility: CLINIC | Age: 39
End: 2024-09-13
Payer: COMMERCIAL

## 2024-09-13 NOTE — PROGRESS NOTES
Provider received a message from patient to call her back about Pen-13 survey for the Personalized Care Program. Provider left a VM.

## 2024-09-16 ENCOUNTER — APPOINTMENT (OUTPATIENT)
Dept: DERMATOLOGY | Facility: CLINIC | Age: 39
End: 2024-09-16
Payer: MEDICARE

## 2024-09-16 ENCOUNTER — APPOINTMENT (OUTPATIENT)
Dept: BEHAVIORAL HEALTH | Facility: CLINIC | Age: 39
End: 2024-09-16
Payer: MEDICARE

## 2024-09-16 NOTE — TELEPHONE ENCOUNTER
Patient called back she said she has a CPAP but is not using it at night she keeps having to take the mask off because it falls off then she said something about maybe having a full mask and being counterphobic . She said she's not sure if she needs a new sleep study or what you suggest.

## 2024-09-16 NOTE — TELEPHONE ENCOUNTER
Left VM message asking her top clarify if she is currently on a CPAP machine. I am not sure if she will call back, as she reported she was homeless at last visit.

## 2024-09-19 NOTE — TELEPHONE ENCOUNTER
"The order for sleep study that was ordered on 08/01/24 was denied by insurance.  Pt feels like since she isn't able to sleep she \"feels like she is losing her mind\" - per pt.  Please advise  "

## 2024-09-20 ENCOUNTER — TELEMEDICINE (OUTPATIENT)
Dept: BEHAVIORAL HEALTH | Facility: CLINIC | Age: 39
End: 2024-09-20
Payer: MEDICARE

## 2024-09-20 DIAGNOSIS — R41.840 ATTENTION AND CONCENTRATION DEFICIT: ICD-10-CM

## 2024-09-20 DIAGNOSIS — F43.10 COMPLEX POSTTRAUMATIC STRESS DISORDER: ICD-10-CM

## 2024-09-20 DIAGNOSIS — F17.200 TOBACCO USE DISORDER: ICD-10-CM

## 2024-09-20 DIAGNOSIS — G47.33 OBSTRUCTIVE SLEEP APNEA SYNDROME: ICD-10-CM

## 2024-09-20 PROCEDURE — 3061F NEG MICROALBUMINURIA REV: CPT | Performed by: STUDENT IN AN ORGANIZED HEALTH CARE EDUCATION/TRAINING PROGRAM

## 2024-09-20 PROCEDURE — 3046F HEMOGLOBIN A1C LEVEL >9.0%: CPT | Performed by: STUDENT IN AN ORGANIZED HEALTH CARE EDUCATION/TRAINING PROGRAM

## 2024-09-20 PROCEDURE — 99215 OFFICE O/P EST HI 40 MIN: CPT | Performed by: STUDENT IN AN ORGANIZED HEALTH CARE EDUCATION/TRAINING PROGRAM

## 2024-09-20 PROCEDURE — 3049F LDL-C 100-129 MG/DL: CPT | Performed by: STUDENT IN AN ORGANIZED HEALTH CARE EDUCATION/TRAINING PROGRAM
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
## 2024-09-20 NOTE — Clinical Note
Christiana: see pharmacy issue with Dexcom Ivett: can we help her with housing and she misunderstood about the PEN-13, willing to do it. Can we get a navigator to help her with scheduling appointments? Ridge: were you able to reach her about smoking cessation? Also - would you be the person to help her get her meds mailed to her?

## 2024-09-23 ENCOUNTER — APPOINTMENT (OUTPATIENT)
Dept: BEHAVIORAL HEALTH | Facility: CLINIC | Age: 39
End: 2024-09-23
Payer: MEDICARE

## 2024-09-24 RX ORDER — BUPROPION HYDROCHLORIDE 100 MG/1
100 TABLET, EXTENDED RELEASE ORAL 2 TIMES DAILY
Qty: 60 TABLET | Refills: 1 | Status: SHIPPED | OUTPATIENT
Start: 2024-09-24 | End: 2024-11-23

## 2024-09-24 RX ORDER — TOPIRAMATE 50 MG/1
50 TABLET, FILM COATED ORAL 2 TIMES DAILY
Qty: 60 TABLET | Refills: 1 | Status: SHIPPED | OUTPATIENT
Start: 2024-09-24 | End: 2024-11-23
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
## 2024-09-24 NOTE — ASSESSMENT & PLAN NOTE
Smokes 2 PPD, increasing lately in the context of stress caused by ex-boyfriend infidelity and breakup, HIV diagnosis, temporary homelessness. Has tried nicotine patches in the past.  - referral already made to cessation counseling with pharmacist  - trial Wellbutrin  mg BID

## 2024-09-24 NOTE — ASSESSMENT & PLAN NOTE
Pulling her CPAP off in her sleep without realizing it. Insurance won't cover a new sleep study.  - check with sleep medicine about CPAP options

## 2024-09-24 NOTE — ASSESSMENT & PLAN NOTE
Endocrine ordered Dexcom but the pharmacy is telling her she'll have to pay a lofty fee initially and then every month  - check with endocrine on this  - continue with diabetic dietitian
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
## 2024-09-24 NOTE — PATIENT INSTRUCTIONS
Please send me a message in [facility] if things aren't going as expected or you are unsure about something we discussed. If this isn't working, you can call the psychiatry department line at [phone], or leave me a voicemail at [phone].  If you are having thoughts of harming yourself or ending your life, please call the national suicide hotline 24/7 at 244. For this and other mental health emergencies, such as losing touch with reality, call the [facility] 24/7 mobile crisis hotline at 817-329-5352, or dial 911 for emergency services.

## 2024-09-29 NOTE — PROGRESS NOTES
Patient: Drea Fajardo  : 1985 AGE: 39 y.o. SEX:female   MRN: [id number]   Provider: ALLY Hurt     Location [facility]   Service Date: 2024     PCP: Hany Sabillon MD   Referred by: Hany Sabillon, *          [facility]  New Visit Note    Virtual or Telephone Consent  An interactive audio and video telecommunication system which permits real time communications between the patient (at the originating site) and provider (at the distant site) was utilized to provide this telehealth service.   Verbal consent was requested and obtained from Drea Fajardo on this date, 24 for a telehealth visit.      HISTORY OF PRESENT ILLNESS     Drea Fajardo is a 39 y.o. female with a h/o  MEKA, Obesity, DM, HLD, HIV, ADHD/Depression/PTSD/ bipolar disorder, current smoker  who presents to [facility].    24: NPV with concerns of MEKA management. Patient was diagnosed with MEKA in 2019 by PSG and was using CPAP intermittently for 2 years then stopped due to removing mask in her sleep without her knowledge. Previously found benefit with CPAP use. No longer has CPAP machine. Now more symptomatic with snoring, night time awakenings, un refreshed sleep, and EDS. Reports vivid dreams, nocturnal enuresis (3 episodes in the past 2 years), and rare sleep paralysis.     Sleeps alone. Sleep schedule varies. Sometimes gets into bed at 10AM, other times 9PM. Reports TST 4-5 hours/night. Takes multiple naps during the day. Always wakes un refreshed. Gets drowsy while driving. Pulls over to take a nap. ----> split PSG ordered     SLEEP STUDY HISTORY (personally reviewed raw data such as interpretation report, data sheet, hypnogram, and titration table if available and applicable)  -Split PSG 2019-showing mild MEKA with AHI 7.3, SpO2 cecelia 90%.  Patient was titrated from CPAP 5 to 10 cm H2O.  Recommend CPAP  10 ResMed Quattro medium fullface mask medium    Breathing during sleep: snoring, witnessed apneas, gasping/choking for air, and wake with palpitations  Behaviors at night: Yes   Sleep paralysis: Yes, rare   Hypnogogic or hypnopompic hallucinations: No   Cataplexy: No     RLS screen: Patient admits having urge to move legs that occurs at rest (sitting, getting into bed, or lying n bed), worse in the evening, and relieved temporarily with movement.   Frequency of RLS symptoms: 1-2x/month  RLS symptoms occurring in daytime: No   RLS symptoms progressing to arms: No   RLS symptoms affect sleep onset: No   RLS symptoms wakes patient up from sleep: No   Current or past treatment for RLS: denies    Daytime Symptoms:  On awakening patient reports: wake unrefreshed, feels sleepy, morning headaches, morning dry mouth, morning sore throat, and hard to get out of bed  Patient report some daytime symptoms including: DAYTIME SYMPTOMS: reports excessive daytime sleepiness sleep inertia irritability during the day difficulty with memory or concentration during the day feeling sleepy when driving    Sleep environment:  Preferred sleep position: back and stomach  Room is dark: Yes  Room is quiet: Yes  Room is cool: Yes  Bed comfort: good    SLEEP HABITS  Caffeine consumption: Yes, 1-2 cups/day  Alcohol consumption: No  Smoking: Yes, smoking 1.5 PPD   Marijuana: No  Sleep aids: denies     WEIGHT: lost 90+ lbs in 1 year      ESS: 10  MINDY: 13    REVIEW OF SYSTEMS     All other systems have been reviewed and are negative.    ALLERGIES     Allergies   Allergen Reactions    Duloxetine Hcl Syncope     Syncope    Hydrocodone-Acetaminophen Hives    Lanolin Unknown    Oxycodone-Acetaminophen Other    Wool Unknown    Morphine Unknown and Rash       MEDICATIONS     Current Outpatient Medications   Medication Sig Dispense Refill    azelastine (Astelin) 137 mcg (0.1 %) nasal spray Administer 2 sprays into affected nostril(s) once daily.       "Biktarvy -25 mg tablet Take 1 tablet by mouth once daily.      blood sugar diagnostic (OneTouch Verio test strips) strip 1 strip once daily. 100 strip 3    blood-glucose meter (OneTouch Verio Flex meter) misc 1 each if needed (Use to test blood sugar as directed). 1 each 0    buPROPion SR (Wellbutrin SR) 100 mg 12 hr tablet Take 1 tablet (100 mg) by mouth 2 times a day. Do not crush, chew, or split. 60 tablet 1    cephalexin (Keflex) 500 mg capsule       COVID-19 antigen test (BinaxNOW COVID-19 Ag Self Test) kit TEST AS DIRECTED TODAY      Dexcom G7 Sensor device Use 1 sensor as directed every 10 days to monitor glucose. If needed, Dexcom's website offers free patches to help keep sensor in place 9 each 3    diphenhydrAMINE (Benadryl Allergy) 25 mg tablet Take 1 tablet (25 mg) by mouth every 6 hours if needed for itching or allergies.      doxycycline (Vibramycin) 50 mg capsule Take one pill by by mouth once daily. Take with at least 8 ounces (large glass) of water, do not lie down for 30 minutes after 30 capsule 2    duloxetine HCl (DULOXETINE ORAL)       glucose 4 gram chewable tablet Chew 4 tablets (16 g) if needed for low blood sugar - see comments. 40 tablet 11    ibuprofen 800 mg tablet Take 1 tablet (800 mg) by mouth.      insulin degludec (Tresiba FlexTouch U-100) 100 unit/mL (3 mL) injection Inject 40 units once daily 45 mL 3    lancets 30 gauge misc 1 Lancet if needed (Daily as directed to check blood sugar). 100 each 3    metformin HCl (METFORMIN ORAL)       metFORMIN XR (Glucophage-XR) 500 mg 24 hr tablet Take 1 tablet (500 mg) by mouth 2 times a day with meals. Do not crush, chew, or split. 180 tablet 3    mupirocin (Bactroban) 2 % ointment Apply to the inside of the nostrils 3 times daily. Pinch shut for 10 seconds. Do this for 10 days. 22 g 0    omeprazole (PriLOSEC) 40 mg DR capsule       pen needle, diabetic 32 gauge x 5/32\" needle Use with insulin injection once daily 100 each 3    " semaglutide (Ozempic) 0.25 mg or 0.5 mg (2 mg/3 mL) pen injector Inject 0.25 mg under the skin every 7 days for 30 days, THEN 0.5 mg every 7 days. 9 mL 3    topiramate (Topamax) 50 mg tablet Take 1 tablet (50 mg) by mouth 2 times a day. 60 tablet 1    acetaminophen (Tylenol) 500 mg tablet Take 2 tablets (1,000 mg) by mouth every 6 hours.      omeprazole (PriLOSEC) 40 mg DR capsule Take 1 capsule (40 mg) by mouth once daily in the morning. Take before meals. 90 capsule 3     Current Facility-Administered Medications   Medication Dose Route Frequency Provider Last Rate Last Admin    semaglutide (Ozempic) injection 0.25 mg  0.25 mg subcutaneous Once Hany Sabillon MD           PAST HISTORIES     PERTINENT PAST MEDICAL HISTORY: See HPI    PERTINENT PAST SURGICAL HISTORY for Sleep Medicine:  non-contributory    PERTINENT FAMILY HISTORY for Sleep Medicine:  Patient denies family history of any sleep disorder.    PERTINENT SOCIAL HISTORY:  She  reports that she has been smoking cigarettes. She has never used smokeless tobacco. She reports that she does not currently use alcohol. She reports that she does not use drugs. She currently lives alone.    Active Problems, Allergy List, Medication List, and PMH/PSH/FH/Social Hx have been reviewed and reconciled in chart. No significant changes unless documented in the pertinent chart section. Updates made when necessary.     PHYSICAL EXAM     VITAL SIGNS: [facility] 07/16/2022     CURRENT WEIGHT: There were no vitals filed for this visit.     PREVIOUS WEIGHTS:  Wt Readings from Last 3 Encounters:   06/18/24 95.3 kg (210 lb)   06/10/24 94.8 kg (209 lb)   06/09/24 90.7 kg (200 lb)     Limited telehealth examination  PHYSICAL EXAMINATION  General appearance: awake alert in NAD  Affect: normal  HEENT: Nasal congestion absent  Lungs: no cough.  Neuro: normal speech      RESULTS/DATA     Iron (UG/DL)   Date Value   03/26/2019 82     Iron Saturation (%)   Date Value   03/26/2019 24.6      TIBC (UG/DL)   Date Value   03/26/2019 333       Bicarbonate   Date Value Ref Range Status   03/07/2024 27 21 - 32 mmol/L Final       ASSESSMENT/PLAN     Ms. Fajardo is a 39 y.o. female and She was referred to the [facility] for evaluation of MEKA    Problem List, Orders, Assessment, Recommendations:    #MEKA  - Split PSG 4/13/2019-showing mild MEKA with AHI 7.3, SpO2 cecelia 90%.  Patient was titrated from CPAP 5 to 10 cm H2O.  Recommend CPAP 10 ResMed Quattro medium fullface mask medium  - Remote hx, previously on cpap for 2 years with positive benefit. No longer has machine, now more symptomatic   - recommend split-night PSG for MEKA reevaluation  - Follow up after sleep study to review results and determine plan of care  -Diet, exercise, and weight loss were emphasized today in clinic, as were non-supine sleep, avoiding alcohol in the late evening, and driving or operating heavy machinery when sleepy.       #Nocturnal enuresis/sleep paralysis/poor sleep hygiene  - Discussed sleep hygiene and consistent sleep schedule   - Will need to first treat underlying MEKA  - Encouraged to journal events, will reevaluate after tx of MEKA     #ADHD/PTSD  - On meds, defer management to psychiatry      #Obesity  BMI Readings from Last 1 Encounters:   06/18/24 33.89 kg/m²     - Encouraged healthy weight loss via diet and exercise  - Weight loss can help in the long term treatment of MEKA.  - Defer management to PCP     #Tobacco use disorder  - Smokes 1.5 PPD (smoking during virtual visit today)  - Has tried nicotine patches in the past  - Continue Wellbutrin  mg BID  - Smoking cessation strongly encouraged     All of patient's questions were answered. She verbalizes understanding and agreement with my assessment and plan.    Disposition    Return to clinic in 3 months    I personally spent 45 minutes today (exclusive of procedures) providing care for this patient, including preparation, face to  face time, EMR documentation and other services such as review of medical records, diagnostic results, patient education, counseling, and coordination of care.

## 2024-09-30 ENCOUNTER — APPOINTMENT (OUTPATIENT)
Dept: SLEEP MEDICINE | Facility: CLINIC | Age: 39
End: 2024-09-30
Payer: MEDICARE

## 2024-09-30 DIAGNOSIS — N39.44 NOCTURNAL ENURESIS: ICD-10-CM

## 2024-09-30 DIAGNOSIS — E66.9 OBESITY (BMI 30-39.9): ICD-10-CM

## 2024-09-30 DIAGNOSIS — G47.19 EXCESSIVE DAYTIME SLEEPINESS: ICD-10-CM

## 2024-09-30 DIAGNOSIS — G47.53 RECURRENT ISOLATED SLEEP PARALYSIS: ICD-10-CM

## 2024-09-30 DIAGNOSIS — Z72.821 POOR SLEEP HYGIENE: ICD-10-CM

## 2024-09-30 DIAGNOSIS — G47.33 OBSTRUCTIVE SLEEP APNEA SYNDROME: Primary | ICD-10-CM

## 2024-09-30 DIAGNOSIS — F17.210 CIGARETTE SMOKER: ICD-10-CM

## 2024-09-30 PROCEDURE — 3049F LDL-C 100-129 MG/DL: CPT | Performed by: NURSE PRACTITIONER

## 2024-09-30 PROCEDURE — 99204 OFFICE O/P NEW MOD 45 MIN: CPT | Performed by: NURSE PRACTITIONER

## 2024-09-30 PROCEDURE — 3046F HEMOGLOBIN A1C LEVEL >9.0%: CPT | Performed by: NURSE PRACTITIONER

## 2024-09-30 PROCEDURE — 4004F PT TOBACCO SCREEN RCVD TLK: CPT | Performed by: NURSE PRACTITIONER

## 2024-09-30 PROCEDURE — 3061F NEG MICROALBUMINURIA REV: CPT | Performed by: NURSE PRACTITIONER

## 2024-09-30 ASSESSMENT — SLEEP AND FATIGUE QUESTIONNAIRES
HOW LIKELY ARE YOU TO NOD OFF OR FALL ASLEEP WHILE SITTING QUIETLY AFTER LUNCH WITHOUT ALCOHOL: SLIGHT CHANCE OF DOZING
HOW LIKELY ARE YOU TO NOD OFF OR FALL ASLEEP WHILE LYING DOWN TO REST IN THE AFTERNOON WHEN CIRCUMSTANCES PERMIT: MODERATE CHANCE OF DOZING
DIFFICULTY_FALLING_ASLEEP: MODERATE
HOW LIKELY ARE YOU TO NOD OFF OR FALL ASLEEP IN A CAR, WHILE STOPPED FOR A FEW MINUTES IN TRAFFIC: MODERATE CHANCE OF DOZING
SITING INACTIVE IN A PUBLIC PLACE LIKE A CLASS ROOM OR A MOVIE THEATER: SLIGHT CHANCE OF DOZING
SATISFACTION_WITH_CURRENT_SLEEP_PATTERN: DISSATISFIED
HOW LIKELY ARE YOU TO NOD OFF OR FALL ASLEEP WHILE SITTING AND READING: SLIGHT CHANCE OF DOZING
WORRIED_DISTRESSED_DUE_TO_SLEEP: MUCH
HOW LIKELY ARE YOU TO NOD OFF OR FALL ASLEEP WHILE WATCHING TV: SLIGHT CHANCE OF DOZING
HOW LIKELY ARE YOU TO NOD OFF OR FALL ASLEEP WHILE SITTING INACTIVE IN A PUBLIC PLACE: SLIGHT CHANCE OF DOZING
SLEEP_PROBLEM_NOTICEABLE_TO_OTHERS: NOT AT ALL NOTICEABLE
HOW LIKELY ARE YOU TO NOD OFF OR FALL ASLEEP WHEN YOU ARE A PASSENGER IN A CAR FOR AN HOUR WITHOUT A BREAK: MODERATE CHANCE OF DOZING
HOW LIKELY ARE YOU TO NOD OFF OR FALL ASLEEP WHILE SITTING AND TALKING TO SOMEONE: SLIGHT CHANCE OF DOZING
ESS TOTAL SCORE: 11
DIFFICULTY_STAYING_ASLEEP: MILD
ESS-CHAD TOTAL SCORE: 10
HOW LIKELY ARE YOU TO NOD OFF OR FALL ASLEEP IN A CAR, WHILE STOPPED FOR A FEW MINUTES IN TRAFFIC: SLIGHT CHANCE OF DOZING
WAKING_TOO_EARLY: MILD
SLEEP_PROBLEM_INTERFERES_DAILY_ACTIVITIES: MUCH

## 2024-09-30 ASSESSMENT — ENCOUNTER SYMPTOMS
LOSS OF SENSATION IN FEET: 0
OCCASIONAL FEELINGS OF UNSTEADINESS: 1
DEPRESSION: 0

## 2024-09-30 ASSESSMENT — COLUMBIA-SUICIDE SEVERITY RATING SCALE - C-SSRS
1. IN THE PAST MONTH, HAVE YOU WISHED YOU WERE DEAD OR WISHED YOU COULD GO TO SLEEP AND NOT WAKE UP?: NO
6. HAVE YOU EVER DONE ANYTHING, STARTED TO DO ANYTHING, OR PREPARED TO DO ANYTHING TO END YOUR LIFE?: NO
2. HAVE YOU ACTUALLY HAD ANY THOUGHTS OF KILLING YOURSELF?: NO

## 2024-09-30 ASSESSMENT — PATIENT HEALTH QUESTIONNAIRE - PHQ9
2. FEELING DOWN, DEPRESSED OR HOPELESS: NOT AT ALL
SUM OF ALL RESPONSES TO PHQ9 QUESTIONS 1 AND 2: 0
1. LITTLE INTEREST OR PLEASURE IN DOING THINGS: NOT AT ALL

## 2024-09-30 NOTE — TELEPHONE ENCOUNTER
No further information received from patient after leaving VM asking her to provide name of medication she is calling about.

## 2024-10-09 ENCOUNTER — APPOINTMENT (OUTPATIENT)
Dept: ENDOCRINOLOGY | Facility: CLINIC | Age: 39
End: 2024-10-09
Payer: MEDICARE

## 2024-10-14 NOTE — TELEPHONE ENCOUNTER
Phone number not valid.  
Pt called to say that she has a yeast infection that started on Sunday (06/11/23).  She said it started because she had eaten something really sugary that day which let to the yeast infection.  She said her sugars have been good except that day (since she ate something sugary).  She said it does have a smell, not sure of any color to discharge, blood (due to scratching), and spreading.  She feels like it has gone from her front to the back of her buttocks.  If we could send something in for her.  She uses [facility] in [place] on [address].  Thanks:)  
no

## 2024-10-17 ENCOUNTER — APPOINTMENT (OUTPATIENT)
Dept: PRIMARY CARE | Facility: CLINIC | Age: 39
End: 2024-10-17
Payer: MEDICARE

## 2024-10-25 ENCOUNTER — APPOINTMENT (OUTPATIENT)
Dept: ENDOCRINOLOGY | Facility: CLINIC | Age: 39
End: 2024-10-25
Payer: COMMERCIAL

## 2024-10-25 ENCOUNTER — APPOINTMENT (OUTPATIENT)
Dept: BEHAVIORAL HEALTH | Facility: CLINIC | Age: 39
End: 2024-10-25
Payer: MEDICARE

## 2024-10-25 ENCOUNTER — TELEPHONE (OUTPATIENT)
Dept: BEHAVIORAL HEALTH | Facility: CLINIC | Age: 39
End: 2024-10-25

## 2024-10-25 DIAGNOSIS — F43.10 COMPLEX POSTTRAUMATIC STRESS DISORDER: ICD-10-CM

## 2024-10-25 DIAGNOSIS — R41.840 ATTENTION AND CONCENTRATION DEFICIT: ICD-10-CM

## 2024-10-25 DIAGNOSIS — F17.200 TOBACCO USE DISORDER: ICD-10-CM

## 2024-10-25 PROCEDURE — 3046F HEMOGLOBIN A1C LEVEL >9.0%: CPT | Performed by: STUDENT IN AN ORGANIZED HEALTH CARE EDUCATION/TRAINING PROGRAM

## 2024-10-25 PROCEDURE — 99215 OFFICE O/P EST HI 40 MIN: CPT | Performed by: STUDENT IN AN ORGANIZED HEALTH CARE EDUCATION/TRAINING PROGRAM

## 2024-10-25 PROCEDURE — 3049F LDL-C 100-129 MG/DL: CPT | Performed by: STUDENT IN AN ORGANIZED HEALTH CARE EDUCATION/TRAINING PROGRAM

## 2024-10-25 PROCEDURE — 3061F NEG MICROALBUMINURIA REV: CPT | Performed by: STUDENT IN AN ORGANIZED HEALTH CARE EDUCATION/TRAINING PROGRAM

## 2024-10-25 RX ORDER — BUPROPION HYDROCHLORIDE 150 MG/1
150 TABLET, EXTENDED RELEASE ORAL 2 TIMES DAILY
Qty: 60 TABLET | Refills: 1 | Status: SHIPPED | OUTPATIENT
Start: 2024-10-25 | End: 2024-12-24

## 2024-10-25 RX ORDER — TOPIRAMATE 50 MG/1
50 TABLET, FILM COATED ORAL 2 TIMES DAILY
Qty: 60 TABLET | Refills: 1 | Status: SHIPPED | OUTPATIENT
Start: 2024-10-25 | End: 2024-12-24

## 2024-10-25 NOTE — PROGRESS NOTES
Therapist from Personalized Care program left voice mail for patient about possibly scheduling at 2pm on Friday, November 1. Call back number is [phone].
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
## 2024-10-25 NOTE — Clinical Note
"Ivett can you keep trying to reach out? She was actually afraid to reach back to you, saying \"I think I burned that bridge, she's probably mad at me for not calling back.\" Christina can you take her on? She said anytime is fine but 2-3pm ideal. Yes, she can be hard to get ahold of. I think she has pretty dramatic ADHD."

## 2024-10-25 NOTE — ASSESSMENT & PLAN NOTE
Smokes 2 PPD, increasing lately in the context of stress caused by ex-boyfriend infidelity and breakup, HIV diagnosis, temporary homelessness. Has tried nicotine patches in the past.  - referral already made to cessation counseling with pharmacist  - trial Wellbutrin  mg BID - needs to
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
## 2024-10-31 ENCOUNTER — TELEPHONE (OUTPATIENT)
Dept: BEHAVIORAL HEALTH | Facility: CLINIC | Age: 39
End: 2024-10-31
Payer: COMMERCIAL

## 2024-11-08 ENCOUNTER — APPOINTMENT (OUTPATIENT)
Dept: BEHAVIORAL HEALTH | Facility: CLINIC | Age: 39
End: 2024-11-08
Payer: MEDICARE

## 2024-11-08 DIAGNOSIS — F33.1 MODERATE EPISODE OF RECURRENT MAJOR DEPRESSIVE DISORDER: ICD-10-CM

## 2024-11-08 PROCEDURE — 3046F HEMOGLOBIN A1C LEVEL >9.0%: CPT | Performed by: COUNSELOR

## 2024-11-08 PROCEDURE — 90834 PSYTX W PT 45 MINUTES: CPT | Performed by: COUNSELOR

## 2024-11-08 PROCEDURE — 3061F NEG MICROALBUMINURIA REV: CPT | Performed by: COUNSELOR

## 2024-11-08 PROCEDURE — 3049F LDL-C 100-129 MG/DL: CPT | Performed by: COUNSELOR

## 2024-11-08 NOTE — PROGRESS NOTES
All Individuals Present: Patient and Provider (Encounter Provider)     ID: Drea Fajardo is a 39 y.o. female      Pt met with [facility] for follow-up visit for symptoms of depression.    An interactive audio and video telecommunication system which permits real time communications between the patient (at the originating site) and the provider (at the distant site) was utilized to provide this telehealth service. Verbal consent was requested and obtained from Drea Fajardo on this date 11/08/24 for a telehealth visit.    Pt is meeting with therapist as a part of Personalized Care.    Mental Status Exam  Oriented: Person, Place, and Time  Appearance: well groomed, appropriate eye contact  Affect/Mood: flat  Memory:  intact  Speech:  Normal rate, volume, prosody  Thought:  Abstract reasoning appropriate to age  Judgment: Appropriate to age  Insight: intact  Attitude/Behavior: cooperative    Risk Assessment:  Imminent Risk of Suicide or Serious Self-Injury: None, denied  Imminent Risk of Violence or Homicide: None, denied    Progress Note:  Pt met with [facility] for the Personalized Care program. Pt shared health history and identified the challenges she has experienced. Pt noted that she is uncertain what support counseling will provide, but is willing to talk to [facility] for now.    Treatment Goal: Reduce symptoms of depression to improve overall health    Treatment modality/frequency: weekly individual therapy.    Start Time: 2:13 pm  End Time: 3:04 pm  CPT Code: 20011    Attestation Statements   Number of minutes spent performing psychotherapy: 51

## 2024-11-15 ENCOUNTER — APPOINTMENT (OUTPATIENT)
Dept: BEHAVIORAL HEALTH | Facility: CLINIC | Age: 39
End: 2024-11-15
Payer: MEDICARE

## 2024-11-20 ENCOUNTER — APPOINTMENT (OUTPATIENT)
Dept: SLEEP MEDICINE | Facility: CLINIC | Age: 39
End: 2024-11-20
Payer: MEDICARE

## 2024-11-22 ENCOUNTER — APPOINTMENT (OUTPATIENT)
Dept: BEHAVIORAL HEALTH | Facility: CLINIC | Age: 39
End: 2024-11-22
Payer: MEDICARE

## 2024-11-22 DIAGNOSIS — F43.10 COMPLEX POSTTRAUMATIC STRESS DISORDER: ICD-10-CM

## 2024-11-22 DIAGNOSIS — F17.200 TOBACCO USE DISORDER: ICD-10-CM

## 2024-11-22 DIAGNOSIS — R41.840 ATTENTION AND CONCENTRATION DEFICIT: ICD-10-CM
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
## 2024-11-24 NOTE — PATIENT INSTRUCTIONS
[email or web address]     1-800-QUIT-NOW for free nicotine replacement    Please send me a message in [facility] if things aren't going as expected or you are unsure about something we discussed. If this isn't working, you can call the psychiatry department line at [phone], or leave me a voicemail at [phone].  If you are having thoughts of harming yourself or ending your life, please call the national suicide hotline 24/7 at 450. For this and other mental health emergencies, such as losing touch with reality, call the [facility] 24/7 mobile crisis hotline at 744-723-8229, or dial 911 for emergency services.
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
## 2024-11-24 NOTE — ASSESSMENT & PLAN NOTE
Smokes 1.5-2 PPD, increased in the context of stress caused by ex-boyfriend infidelity and breakup, HIV diagnosis, temporary homelessness. Has tried nicotine patches in the past. Now so low on money that she is about to run out of cigarettes.  - 1-800-QUIT-NOW for free nicotine patches to prevent withdrawal  - referral already made to cessation counseling with pharmacist  - trial Wellbutrin  mg BID - car broke down and can't  - I explained about mail order pharmacy - CM to assist
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
## 2024-11-25 DIAGNOSIS — G47.33 OSA (OBSTRUCTIVE SLEEP APNEA): Primary | ICD-10-CM

## 2024-11-25 NOTE — PROGRESS NOTES
Operative Note    Patient: Chica Michael 51 year old female    MRN: [id number]    Surgeon(s): Joe Sena MD  Phone Number: [phone]                       Surgeon(s) and Role:     * Joe Sena MD - Primary    Assistant(s): OR tech    Pre-Op Diagnosis: SU (stress urinary incontinence, female) [N39.3]     Post-Op Diagnosis: Same     Procedure: Procedure(s):  MID URETHRAL SLING PLACEMENT WITH OBTRYX-2    Anesthesia Type: General                                   Complications: None    Description: Mid urethral sling placement using the OBTRYX  system was performed.  A weighted speculum placed in the vagina.  A Decker catheter was then inserted revealing clear urine.    A mid urethral incision, approximately 1.5 cm in vertical length, was performed using a knife approximately 1 cm from the edge of the urethra with the Decker still engaged.  All tissues had.  Previously injected with half percent Marcaine plain.    The sub-mucosal tissue was dissected off the urethra and down to the level of the juliana-urethral sulcus.  This was done bilaterally.  Next the medialmost portion of the obturator foramen was identified by palpation and marked with a marking pen.  The marked off areas which appeared level to the urethral opening and were  injected using a bent 22-gauge spinal needle with half percent Marcaine with epinephrine down to the level of the sulcus bilaterally.    Next, a stab incision was made on the left side and the curved Obtryx needle was then inserted into the obturator foramen and guided with a finger submucosally through the periurethral space.  The mesh was then attached to the end of the needle using the association loop and then the needle was backed out through the initial incision.  The same procedure was done to the left side without difficulty.    The mesh was then tensioned until it rested against the urethra over a mixture clamp so as not to over correct.  It was completely flat against the  Patient PSG if she is unable to afford.  Will change to HSAT for MEKA reevaluation.  Order placed.   surface of the urethra.    The 70 degree cystoscope was then inserted into the bladder and the urethra appeared free of any injury to the bladder or urethra.    At this point the the sutures for the plastic sheath were cut and the sheath removed.  Excellent placement was appreciated and the final centering tab was cut and removed.  No excessive tension was appreciated on the mesh.    The periurethral fascia was placed up against the visible mesh with 2-0 Vicryl sutures in a figure-of-eight fashion and the skin was then closed with 3 -0 Vicryl in interrupted fashion.  The lateral incisions were closed with skin glue.  Minimal bleeding was appreciated throughout      Findings: Normal-appearing bladder and ureter post placement of the mesh.    Specimens Removed: No specimens collected     Estimated Blood Loss: 50 mL         Implants:   Implant Name Type Inv. Item Serial No.  Lot No. LRB No. Used Action   SLING SUBURETHRAL MIDURETHRAL TRNSTR THK.[id number] [id number] 22CM PP - [id number] Other Implant SLING SUBURETHRAL MIDURETHRAL TRNSTR THK.[id number] [id number] 22CM PP  [facility] [id number] N/A 1 Implanted           I was present for the key portions of the procedure and was immediately available for the non-key portions

## 2024-12-12 ENCOUNTER — APPOINTMENT (OUTPATIENT)
Dept: ENDOCRINOLOGY | Facility: CLINIC | Age: 39
End: 2024-12-12
Payer: MEDICARE

## 2024-12-20 ENCOUNTER — APPOINTMENT (OUTPATIENT)
Dept: ENDOCRINOLOGY | Facility: CLINIC | Age: 39
End: 2024-12-20
Payer: MEDICARE

## 2024-12-20 ENCOUNTER — APPOINTMENT (OUTPATIENT)
Dept: BEHAVIORAL HEALTH | Facility: CLINIC | Age: 39
End: 2024-12-20
Payer: MEDICARE

## 2024-12-20 DIAGNOSIS — F17.200 TOBACCO USE DISORDER: ICD-10-CM

## 2024-12-20 DIAGNOSIS — F43.10 COMPLEX POSTTRAUMATIC STRESS DISORDER: ICD-10-CM

## 2024-12-20 DIAGNOSIS — R41.840 ATTENTION AND CONCENTRATION DEFICIT: ICD-10-CM

## 2024-12-20 DIAGNOSIS — R29.818: ICD-10-CM

## 2024-12-20 NOTE — Clinical Note
She isn't responding to Johanna and I don't think she can understand her. Also having a major problem with forgetfulness that's preventing me from being able to help her - can we get a CM or RN on board just to do frequent phone reminders? Needs help finding a local CM to help with housing issues, and reminding to call about getting medications delivered, get home sleep study arranged, call tobacco quit line, make sure she gets to appointments, reschedules with endocrine
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
## 2024-12-20 NOTE — Clinical Note
I think I initially referred her to you for help with forgetfulness, but now I'm more concerned about this transient lower extremity paralysis/paresthesia. Sending you a message because she is likely to forget to bring it up. Timing makes it sound like cord ischemia to me; she has all risk factors.
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
## 2024-12-21 NOTE — ASSESSMENT & PLAN NOTE
Forgot to call quit line. We discussed how quitting would be the best thing she could do for her health.  - will call quit line with reminders from our RN/

## 2024-12-21 NOTE — ASSESSMENT & PLAN NOTE
See subjective for history. Given uncontrolled diabetes and smoking, I'm concerned for ischemia; given unknown control of HIV, I'm concerned for transverse myelitis. I explained that I was very concerned about how she described this event and encouraged her to discuss it at her upcoming appointment with neurology.  - I will make neurologist aware

## 2025-01-03 ENCOUNTER — APPOINTMENT (OUTPATIENT)
Dept: SLEEP MEDICINE | Facility: CLINIC | Age: 40
End: 2025-01-03
Payer: MEDICAID

## 2025-01-08 ENCOUNTER — DOCUMENTATION (OUTPATIENT)
Dept: BEHAVIORAL HEALTH | Facility: CLINIC | Age: 40
End: 2025-01-08
Payer: COMMERCIAL

## 2025-01-08 NOTE — PROGRESS NOTES
Provider received a call back from patient. Provider and patient discussed patient's concerns about housing and finding a local CM along with physical health concerns. Provider linked patient with Rachele Martinez CM and Joslyn Cantrell CHW for assistance. Provider also completed the PEN-13 survey for Personalized Care with patient.

## 2025-01-08 NOTE — PROGRESS NOTES
Provider called patient to check in about assistance following outreach from dr. Garcia and to complete PEN-13. Provider left a VM.

## 2025-01-10 ENCOUNTER — OFFICE VISIT (OUTPATIENT)
Dept: NEUROLOGY | Facility: HOSPITAL | Age: 40
End: 2025-01-10
Payer: MEDICARE

## 2025-01-10 ENCOUNTER — DOCUMENTATION (OUTPATIENT)
Dept: BEHAVIORAL HEALTH | Facility: CLINIC | Age: 40
End: 2025-01-10
Payer: COMMERCIAL

## 2025-01-10 VITALS
HEIGHT: 66 IN | SYSTOLIC BLOOD PRESSURE: 123 MMHG | HEART RATE: 105 BPM | WEIGHT: 216.05 LBS | RESPIRATION RATE: 19 BRPM | DIASTOLIC BLOOD PRESSURE: 76 MMHG | BODY MASS INDEX: 34.72 KG/M2

## 2025-01-10 DIAGNOSIS — R41.840 ATTENTION AND CONCENTRATION DEFICIT: ICD-10-CM

## 2025-01-10 DIAGNOSIS — R41.89 COGNITIVE CHANGES: Primary | ICD-10-CM

## 2025-01-10 DIAGNOSIS — Z21 ASYMPTOMATIC HIV INFECTION, WITH NO HISTORY OF HIV-RELATED ILLNESS (MULTI): ICD-10-CM

## 2025-01-10 PROCEDURE — 3008F BODY MASS INDEX DOCD: CPT | Performed by: PSYCHIATRY & NEUROLOGY

## 2025-01-10 PROCEDURE — 3078F DIAST BP <80 MM HG: CPT | Performed by: PSYCHIATRY & NEUROLOGY

## 2025-01-10 PROCEDURE — 99205 OFFICE O/P NEW HI 60 MIN: CPT | Performed by: PSYCHIATRY & NEUROLOGY

## 2025-01-10 PROCEDURE — 99215 OFFICE O/P EST HI 40 MIN: CPT | Performed by: PSYCHIATRY & NEUROLOGY

## 2025-01-10 PROCEDURE — 3050F LDL-C >= 130 MG/DL: CPT | Performed by: PSYCHIATRY & NEUROLOGY

## 2025-01-10 PROCEDURE — 3046F HEMOGLOBIN A1C LEVEL >9.0%: CPT | Performed by: PSYCHIATRY & NEUROLOGY

## 2025-01-10 PROCEDURE — 3074F SYST BP LT 130 MM HG: CPT | Performed by: PSYCHIATRY & NEUROLOGY

## 2025-01-10 PROCEDURE — 3060F POS MICROALBUMINURIA REV: CPT | Performed by: PSYCHIATRY & NEUROLOGY

## 2025-01-10 ASSESSMENT — ENCOUNTER SYMPTOMS
DEPRESSION: 1
LOSS OF SENSATION IN FEET: 1
OCCASIONAL FEELINGS OF UNSTEADINESS: 1

## 2025-01-10 ASSESSMENT — PAIN SCALES - GENERAL: PAINLEVEL_OUTOF10: 6

## 2025-01-10 NOTE — PATIENT INSTRUCTIONS
You were seen today by Dr. Anaya.  It is a pleasure seeing you.    Given the history and today's evaluation, I suspect cognitive changes are likely multifactorial, potentia etiologies include untreated MEKA, PTSD, potential medication side effects, a multifactorial etiology can't be ruled out.    PLAN:  MRI brain w/wo contrast  Check vitamin B12, TSH  Follow-up with sleep medicine for MEKA management  Continue taking your medications regularly  We discussed strategies to remain physically, mentally active and socially engaged.  Follow-up in 4-6 months or earlier if needed    Please call [phone] if you have any questions.     General brain health guidelines:  - make sure your other medical conditions are well controlled (e.g., high blood pressure, high cholesterol, diabetes, sleep apnea etc)  - do not smoke or chew tobacco  - address any sensory deficits (e.g., proper glasses for poor eyesight, hearing aids for hearing loss)  - use a weekly pill box  - eat a heart healthy diet (e.g., lots of fruits and vegetables, low fat and cholesterol)  - exercise at least four days per week, 30 minutes at a time at an intensity that would make it difficult to converse with someone   - make sure you are getting at least 7 hours of quality sleep per night  - keep yourself mentally active daily by reading, playing cards, doing word searches, puzzles, etc.  - stay socially active by being part of a group or organization

## 2025-01-10 NOTE — PROGRESS NOTES
Ms. Fajardo is a 39 -year-old   woman  with 12 years of formal education and PMHx HIV diagnosed in 2024 on Biktarvy, T2DM, history of pancreatitis  who is presenting today with chief complaint of cognitive changes. She is referred by her psychiatrist, Dr. Garcia for cognitive evaluation.     She has been noticing cognitive changes for about  5 years. Onset was insidious. Which became more noticeable after she is being diagnosed wit MEKA, she is not using CPAP, has trouble remembering names of things, forgets scheduled events but uses a calendar. Forgets conversations. Denies repeating self.   She reports being diagnosed with attention disorder as a child and being laced in a special class. But never took a medication for that. Denies any functional impairment. She reports that she was found to have a liver mass when she went to  ED as she was not feeling well, she was found  to have high blood sugar, she never followed with a doctor about her abnormal liver findings    Mood is fluctuating, no suicidal thoughts, has not noticed any significant depressive symptoms. Gets frustrated with memory problems.  No excessive worry or anxiety.   Has good appetite - eats healthy. She takes care of her dog's nutrition as well.   No change in personality, social disinhibition, change in dietary preferences, loss of empathy, stereotyped or repetitive behaviors.   No visual hallucinations, fluctuating cognition, tremors, REM sleep behavior disorder, falls.   She reports flashbacks, panic attacks     Functional changes:   Born and raised in [place], [place]  Sleep: no regular sleep schedule, goes to bed at 5-8 am, wakes up at 12-1 pm  Current living situation - lives in an apartment with her dog in [place] .   Driving - Independent.   Finances - Independent.   Cooking - she cooks, no burnt pans or pots.   ADLS - independent.   Medications - Takes own medication; does not use pillbox.   Weapons at home: no  Knows 911?  "yes      Neuropsychiatric symptoms:   Depression - denies depression. Appetite ok. Sleeping fine. No worthlessness/helplessness. No suicidal thoughts, intent or plans.   Anxiety - Denies.   Psychosis - Denies.   Yomaira - Denies.   Suicidality - Denies.     Prior Work-up:   Normal TSH, Vit B12.   Neuropsychological testing    Past Neuropsychiatric History:  Handedness: right.   History of traumatic brain injury: multiple head injuries, with LOC once when she was 5 years old, she tripped over a rug and she hit her head against a metal door stopper, she tripped over ice when she was 8 years old.   History of seizures: None  History of stroke: None.   Past psychiatric history: PTSD    PMH/PSH:  As above, in addition    Meds: reviewed as listed    Allergies: reviewed as listed    Family history:   Psychiatric: learning disability in mother and father, bipolar disorder in her mother's side  Dementia: paternal grandmother   Parkinsons disease: None  Huntingtons disease: None  ALS: None    Social History:   Tobacco use, >10 PY, rarely drinks alcohol, no current recreational drugs  Single. Has no children   Works at cleaning company     Mental Status Examination:  General appearance: Well-groomed, good eye contact, cooperative  Orientation: Alert and oriented to person, place, and time  Motor: no psychomotor agitation or retardation, stable gait  Speech: regular rate, rhythm, tone, and volume  Mood: \"good\"  Affect: stable, full range, with brightening, and mood congruent  Passive death wish: denies  Suicidal ideation: denies  Thought process: logical, linear, and goal-directed without loosening of associations  Thought content: no hallucinations, delusions, and not responding to internal stimuli  Insight/Judgment: good/good  Praxis: Transitive/Intransitive/Orofacial  Fund of knowledge: good  Recent and remote memory: good  Attention span and concentration: good  Language: normal receptive and expressive language without " "paraphrasic errors    [facility]- Gareth Cognitive Assessment ( 1/10/2025 ) : /30  Visuospatial / Executive: /5  Naming: /3  Read List of Digits: /2  Read List of Letters: /1  Serial Sevens: /3   Language Repeat: /2   Language Fluency: /1   Abstraction: /2   Delayed Recall: /5   Orientation: /6  Education:      \"f\"= [***], \"animals\"= [***]  Memory Index Score (MIS): ***/15  No agraphia or alexia  Completed 3-step Luria task  Negative applause sign  Head turning sign: ***     NEUROLOGICAL EXAMINATION    Opthalmoscopic:   Normal.  Fundi were well visualized with normal disc margins, clear vessels, and vascular pulsations, No disc edema.  The cup/disk ratio was not enlarged.  No hemorrhages or exudates were present in the posterior segments that were visualized.    Cranial nerves:  CN II: visual fields full to confrontation  CN III, IV, VI: Pupils round, reactive to light and accommodation.  Lids symmetric.  No ptosis.  Extra-occular muscles are intact with normal alignment.  No nystagmus  CN V: Facial sensation intact bilaterally.    CN VII: Normal and symmetric facial strength.  Nasolabial folds symmetric  CN VIII: Hearing intact to finger rub  CN IX: Palate elevates symmetrically.  CN XI: Normal shoulder shrug and neck turning  CN XII: Tongue midline, with normal bulk and strength, no fasciculations        Motor:  Muscle bulk was normal in all extremities.  5/5 strength in all extremities  Normal finger taps and hand opening  Normal tone  No abnormal movements    Reflexes:   Right UE     LEFT UE  BR: 2          BR: 2  Biceps: 2    Biceps: 2  Triceps: 2   Triceps: 2    RIGHT LE     LEFT LE  Knee: 2        Knee: 2  Ankle: 2       Ankle: 2    Negative Thea's reflex  No frontal release signs    Coordination:  Normal finger to nose testing and rapid alternating movements    Gait:  Able to stand from seated position with arms across chest  Stable gait    Sensory:  Negative Romberg's sign   " symmetric  CN VIII: Hearing intact to finger rub  CN IX: Palate elevates symmetrically.  CN XI: Normal shoulder shrug and neck turning  CN XII: Tongue midline, with normal bulk and strength, no fasciculations      Motor:  Muscle bulk was normal in all extremities.  5/5 strength in all extremities  Normal finger taps and hand opening  Normal tone  No abnormal movements    Reflexes:   Right UE     LEFT UE  BR: 2          BR: 2  Biceps: 2    Biceps: 2  Triceps: 2   Triceps: 2    RIGHT LE     LEFT LE  Knee: 2        Knee: 2  Ankle: 1       Ankle: 1    Negative Thea's reflex  No frontal release signs    Coordination:  Normal finger to nose testing and rapid alternating movements    Gait:  Able to stand from seated position with arms across chest  Stable gait    Sensory:  Negative Romberg's sign     ROS: All systems reviewed, pertinent positives noted in HPI

## 2025-01-11 ENCOUNTER — CLINICAL SUPPORT (OUTPATIENT)
Dept: SLEEP MEDICINE | Facility: CLINIC | Age: 40
End: 2025-01-11
Payer: MEDICARE

## 2025-01-11 VITALS
BODY MASS INDEX: 34.72 KG/M2 | DIASTOLIC BLOOD PRESSURE: 76 MMHG | SYSTOLIC BLOOD PRESSURE: 123 MMHG | WEIGHT: 216.05 LBS | HEIGHT: 66 IN

## 2025-01-11 DIAGNOSIS — G47.9 SLEEP DISORDER, UNSPECIFIED: ICD-10-CM

## 2025-01-11 DIAGNOSIS — G47.19 EXCESSIVE DAYTIME SLEEPINESS: ICD-10-CM

## 2025-01-11 DIAGNOSIS — G47.53 RECURRENT ISOLATED SLEEP PARALYSIS: ICD-10-CM

## 2025-01-11 DIAGNOSIS — N39.44 NOCTURNAL ENURESIS: ICD-10-CM

## 2025-01-11 DIAGNOSIS — G47.33 OBSTRUCTIVE SLEEP APNEA SYNDROME: ICD-10-CM

## 2025-01-11 PROCEDURE — 95810 POLYSOM 6/> YRS 4/> PARAM: CPT | Performed by: GENERAL PRACTICE

## 2025-01-11 ASSESSMENT — SLEEP AND FATIGUE QUESTIONNAIRES
ESS-CHAD TOTAL SCORE: 17
HOW LIKELY ARE YOU TO NOD OFF OR FALL ASLEEP WHILE WATCHING TV: MODERATE CHANCE OF DOZING
HOW LIKELY ARE YOU TO NOD OFF OR FALL ASLEEP WHILE SITTING QUIETLY AFTER LUNCH WITHOUT ALCOHOL: HIGH CHANCE OF DOZING
HOW LIKELY ARE YOU TO NOD OFF OR FALL ASLEEP WHILE LYING DOWN TO REST IN THE AFTERNOON WHEN CIRCUMSTANCES PERMIT: MODERATE CHANCE OF DOZING
HOW LIKELY ARE YOU TO NOD OFF OR FALL ASLEEP WHILE SITTING AND TALKING TO SOMEONE: SLIGHT CHANCE OF DOZING
HOW LIKELY ARE YOU TO NOD OFF OR FALL ASLEEP WHEN YOU ARE A PASSENGER IN A CAR FOR AN HOUR WITHOUT A BREAK: HIGH CHANCE OF DOZING
HOW LIKELY ARE YOU TO NOD OFF OR FALL ASLEEP IN A CAR, WHILE STOPPED FOR A FEW MINUTES IN TRAFFIC: WOULD NEVER DOZE
SITING INACTIVE IN A PUBLIC PLACE LIKE A CLASS ROOM OR A MOVIE THEATER: HIGH CHANCE OF DOZING
HOW LIKELY ARE YOU TO NOD OFF OR FALL ASLEEP WHILE SITTING AND READING: HIGH CHANCE OF DOZING

## 2025-01-12 NOTE — PROGRESS NOTES
[facility] TECH NOTE:     Patient: Drea Fajardo   MRN//AGE: [id number]  1985  39 y.o.   Technologist: LINDA Harkins   Room: 3   Service Date: 2025        Sleep Testing Location: [facility]:     TECHNOLOGIST SLEEP STUDY PROCEDURE NOTE:   This sleep study is being conducted according to the policies and procedures outlined by the AAS accreditation standards.  The sleep study procedure and processes involved during this appointment was explained to the patient, her questions were answered. The patient verbalized understanding.      The patient is a 39 y.o. year old female scheduled for aDiagnostic PSG Split night with montage of:  standard . she arrived for her appointment.        The study that was ultimately completed was a Diagnostic PSG with montage of:  standard .    The full study Was completed.  Patient questionnaires completed?: yes     Consents signed? yes    Initial Fall Risk Screening:     Drea has not fallen in the last 6 months. her did not result in injury. Drea does not have a fear of falling. He does not need assistance with sitting, standing, or walking. she does not need assistance walking in her home. she does not need assistance in an unfamiliar setting. The patient is notusing an assistive device.     Brief Study observations: A diagnostic PSG was performed. The patient has had an 80 lb, weight loss since her previous study.    Deviation to order/protocol and reason: She did not meet the AHI criteria of 10 events per hour, so CPAP was not introduced.      If PAP, which was preferred mask/pressure/mode: NA      Other:None    After the procedure, the patient/family was informed to ensure followup with ordering clinician for testing results.      Technologist: LINDA Harkins

## 2025-01-13 ENCOUNTER — APPOINTMENT (OUTPATIENT)
Dept: PRIMARY CARE | Facility: CLINIC | Age: 40
End: 2025-01-13
Payer: MEDICARE

## 2025-01-14 ENCOUNTER — APPOINTMENT (OUTPATIENT)
Dept: PSYCHOLOGY | Facility: CLINIC | Age: 40
End: 2025-01-14
Payer: MEDICARE

## 2025-01-14 ENCOUNTER — DOCUMENTATION (OUTPATIENT)
Dept: BEHAVIORAL HEALTH | Facility: CLINIC | Age: 40
End: 2025-01-14

## 2025-01-14 NOTE — PROGRESS NOTES
Drea L Scotty  Age: 39 y.o.  MRN: [id number]  Date: 1/10/2025  Location of service: chart review     Program Details  Medicaid Community Clinical Case Management  Status: Enrolled  Effective Dates: 7/16/2024 - present  Responsible Staff: Chana Scruggs RD      Goals Reviewed:      Summary:  This writer does a chart review.    Appointment start time: 1104  Appointment completion time: 1114  Total time spent with patient (in minutes): 10  Non-Billable Time: 10  Billable Time Total: 0    Rachele Shelley RN

## 2025-01-15 ENCOUNTER — DOCUMENTATION (OUTPATIENT)
Dept: BEHAVIORAL HEALTH | Facility: CLINIC | Age: 40
End: 2025-01-15
Payer: COMMERCIAL

## 2025-01-15 NOTE — PROGRESS NOTES
Provider called patient to check in and discuss finding a local CM. Patient reports neurology appointment went well but that she needs to get a CAT scan in in a couple of weeks. Provider and patient discussed getting patient to see CM at [facility]. Patient reported she would be okay with that. Provider to follow up with patient next week.     Provider called [facility] for more information: provider transferred to Intake but unable to leave a .     Time Spent: 20 Minutes.

## 2025-01-17 ENCOUNTER — APPOINTMENT (OUTPATIENT)
Dept: BEHAVIORAL HEALTH | Facility: CLINIC | Age: 40
End: 2025-01-17
Payer: MEDICARE

## 2025-01-17 ENCOUNTER — APPOINTMENT (OUTPATIENT)
Dept: PRIMARY CARE | Facility: CLINIC | Age: 40
End: 2025-01-17
Payer: COMMERCIAL

## 2025-01-17 ENCOUNTER — LAB (OUTPATIENT)
Dept: LAB | Facility: LAB | Age: 40
End: 2025-01-17
Payer: MEDICARE

## 2025-01-17 DIAGNOSIS — J45.909 ASTHMA, UNSPECIFIED ASTHMA SEVERITY, UNSPECIFIED WHETHER COMPLICATED, UNSPECIFIED WHETHER PERSISTENT (HHS-HCC): ICD-10-CM

## 2025-01-17 DIAGNOSIS — E78.5 HYPERLIPIDEMIA, UNSPECIFIED HYPERLIPIDEMIA TYPE: ICD-10-CM

## 2025-01-17 DIAGNOSIS — F43.10 COMPLEX POSTTRAUMATIC STRESS DISORDER: ICD-10-CM

## 2025-01-17 DIAGNOSIS — Z21 ASYMPTOMATIC HIV INFECTION, WITH NO HISTORY OF HIV-RELATED ILLNESS (MULTI): ICD-10-CM

## 2025-01-17 DIAGNOSIS — Z79.4 INSULIN-REQUIRING OR DEPENDENT TYPE II DIABETES MELLITUS (MULTI): ICD-10-CM

## 2025-01-17 DIAGNOSIS — F41.9 ANXIETY: ICD-10-CM

## 2025-01-17 DIAGNOSIS — F17.200 TOBACCO USE DISORDER: ICD-10-CM

## 2025-01-17 DIAGNOSIS — E11.9 INSULIN-REQUIRING OR DEPENDENT TYPE II DIABETES MELLITUS (MULTI): ICD-10-CM

## 2025-01-17 DIAGNOSIS — E11.9 TYPE 2 DIABETES MELLITUS WITHOUT COMPLICATION, UNSPECIFIED WHETHER LONG TERM INSULIN USE (MULTI): ICD-10-CM

## 2025-01-17 DIAGNOSIS — F33.1 MODERATE EPISODE OF RECURRENT MAJOR DEPRESSIVE DISORDER: ICD-10-CM

## 2025-01-17 DIAGNOSIS — R41.89 COGNITIVE CHANGES: ICD-10-CM

## 2025-01-17 DIAGNOSIS — E11.65 POORLY CONTROLLED TYPE 2 DIABETES MELLITUS (MULTI): ICD-10-CM

## 2025-01-17 DIAGNOSIS — K21.9 GASTROESOPHAGEAL REFLUX DISEASE, UNSPECIFIED WHETHER ESOPHAGITIS PRESENT: ICD-10-CM

## 2025-01-17 DIAGNOSIS — F60.3 BORDERLINE PERSONALITY DISORDER (MULTI): ICD-10-CM

## 2025-01-17 DIAGNOSIS — F31.9 BIPOLAR AFFECTIVE DISORDER, REMISSION STATUS UNSPECIFIED (MULTI): ICD-10-CM

## 2025-01-17 DIAGNOSIS — R41.840 ATTENTION AND CONCENTRATION DEFICIT: ICD-10-CM

## 2025-01-17 DIAGNOSIS — F43.10 PTSD (POST-TRAUMATIC STRESS DISORDER): ICD-10-CM

## 2025-01-17 LAB
ALBUMIN SERPL BCP-MCNC: 4 G/DL (ref 3.4–5)
ALP SERPL-CCNC: 71 U/L (ref 33–110)
ALT SERPL W P-5'-P-CCNC: 10 U/L (ref 7–45)
ANION GAP SERPL CALC-SCNC: 12 MMOL/L (ref 10–20)
AST SERPL W P-5'-P-CCNC: 9 U/L (ref 9–39)
BASOPHILS # BLD AUTO: 0.09 X10*3/UL (ref 0–0.1)
BASOPHILS NFR BLD AUTO: 0.8 %
BILIRUB SERPL-MCNC: 0.4 MG/DL (ref 0–1.2)
BUN SERPL-MCNC: 12 MG/DL (ref 6–23)
CALCIUM SERPL-MCNC: 9.1 MG/DL (ref 8.6–10.3)
CHLORIDE SERPL-SCNC: 104 MMOL/L (ref 98–107)
CHOLEST SERPL-MCNC: 224 MG/DL (ref 0–199)
CHOLESTEROL/HDL RATIO: 5.9
CO2 SERPL-SCNC: 24 MMOL/L (ref 21–32)
CREAT SERPL-MCNC: 0.72 MG/DL (ref 0.5–1.05)
CREAT UR-MCNC: 165.9 MG/DL (ref 20–320)
EGFRCR SERPLBLD CKD-EPI 2021: >90 ML/MIN/1.73M*2
EOSINOPHIL # BLD AUTO: 0.17 X10*3/UL (ref 0–0.7)
EOSINOPHIL NFR BLD AUTO: 1.4 %
ERYTHROCYTE [DISTWIDTH] IN BLOOD BY AUTOMATED COUNT: 12.5 % (ref 11.5–14.5)
EST. AVERAGE GLUCOSE BLD GHB EST-MCNC: 258 MG/DL
GLUCOSE SERPL-MCNC: 248 MG/DL (ref 74–99)
HBA1C MFR BLD: 10.6 %
HCT VFR BLD AUTO: 42.8 % (ref 36–46)
HDLC SERPL-MCNC: 37.9 MG/DL
HGB BLD-MCNC: 14.3 G/DL (ref 12–16)
IMM GRANULOCYTES # BLD AUTO: 0.08 X10*3/UL (ref 0–0.7)
IMM GRANULOCYTES NFR BLD AUTO: 0.7 % (ref 0–0.9)
LDLC SERPL CALC-MCNC: 161 MG/DL
LYMPHOCYTES # BLD AUTO: 2.78 X10*3/UL (ref 1.2–4.8)
LYMPHOCYTES NFR BLD AUTO: 23.2 %
MCH RBC QN AUTO: 29.9 PG (ref 26–34)
MCHC RBC AUTO-ENTMCNC: 33.4 G/DL (ref 32–36)
MCV RBC AUTO: 89 FL (ref 80–100)
MICROALBUMIN UR-MCNC: 46.7 MG/L
MICROALBUMIN/CREAT UR: 28.1 UG/MG CREAT
MONOCYTES # BLD AUTO: 0.64 X10*3/UL (ref 0.1–1)
MONOCYTES NFR BLD AUTO: 5.3 %
NEUTROPHILS # BLD AUTO: 8.21 X10*3/UL (ref 1.2–7.7)
NEUTROPHILS NFR BLD AUTO: 68.6 %
NON HDL CHOLESTEROL: 186 MG/DL (ref 0–149)
NRBC BLD-RTO: 0 /100 WBCS (ref 0–0)
PLATELET # BLD AUTO: 264 X10*3/UL (ref 150–450)
POTASSIUM SERPL-SCNC: 4.3 MMOL/L (ref 3.5–5.3)
PROT SERPL-MCNC: 6.5 G/DL (ref 6.4–8.2)
RBC # BLD AUTO: 4.79 X10*6/UL (ref 4–5.2)
SODIUM SERPL-SCNC: 136 MMOL/L (ref 136–145)
TRIGL SERPL-MCNC: 125 MG/DL (ref 0–149)
TSH SERPL-ACNC: 1.59 MIU/L (ref 0.44–3.98)
VIT B12 SERPL-MCNC: 306 PG/ML (ref 211–911)
VLDL: 25 MG/DL (ref 0–40)
WBC # BLD AUTO: 12 X10*3/UL (ref 4.4–11.3)

## 2025-01-17 PROCEDURE — 85025 COMPLETE CBC W/AUTO DIFF WBC: CPT

## 2025-01-17 PROCEDURE — 80053 COMPREHEN METABOLIC PANEL: CPT

## 2025-01-17 PROCEDURE — 84443 ASSAY THYROID STIM HORMONE: CPT

## 2025-01-17 PROCEDURE — 83036 HEMOGLOBIN GLYCOSYLATED A1C: CPT

## 2025-01-17 PROCEDURE — 82570 ASSAY OF URINE CREATININE: CPT

## 2025-01-17 PROCEDURE — 82607 VITAMIN B-12: CPT

## 2025-01-17 PROCEDURE — 80061 LIPID PANEL: CPT

## 2025-01-17 PROCEDURE — 82043 UR ALBUMIN QUANTITATIVE: CPT

## 2025-01-17 RX ORDER — OMEPRAZOLE 40 MG/1
40 CAPSULE, DELAYED RELEASE ORAL
Qty: 90 CAPSULE | Refills: 0 | Status: SHIPPED | OUTPATIENT
Start: 2025-01-17 | End: 2025-04-17

## 2025-01-17 RX ORDER — BUPROPION HYDROCHLORIDE 150 MG/1
150 TABLET, EXTENDED RELEASE ORAL 2 TIMES DAILY
Qty: 60 TABLET | Refills: 1 | Status: SHIPPED | OUTPATIENT
Start: 2025-01-17 | End: 2025-03-18

## 2025-01-17 RX ORDER — TOPIRAMATE 50 MG/1
50 TABLET, FILM COATED ORAL 2 TIMES DAILY
Qty: 60 TABLET | Refills: 1 | Status: SHIPPED | OUTPATIENT
Start: 2025-01-17 | End: 2025-03-18

## 2025-01-17 NOTE — ASSESSMENT & PLAN NOTE
She just started Wellbutrin, not long enough to have effects. Prefers to hold off on the quit line and see how this medication goes. Still interested in trying vaping as an alternative.  - continue Wellbutrin  mg BID  - encourage cessation, revisit quit line  - vaping as last-line alternative

## 2025-01-17 NOTE — ASSESSMENT & PLAN NOTE
Out of PPI, having trouble reaching PCP and next appointment in 2 months.  - I will refill one 90-day supply of omeprazole
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
## 2025-01-17 NOTE — PATIENT INSTRUCTIONS
Please send me a message in [employer] if things aren't going as expected or you are unsure about something we discussed. If this isn't working, you can call the psychiatry department line at [phone], or leave me a voicemail at [phone].  If you are having thoughts of harming yourself or ending your life, please call the national suicide hotline 24/7 at 264. For this and other mental health emergencies, such as losing touch with reality, call the [facility] 24/7 mobile crisis hotline at 424-889-8292, or dial 911 for emergency services.
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
## 2025-01-17 NOTE — Clinical Note
Just wanted to follow up my message about the transient paralysis - I don't think she mentioned it to you - details in my 12/20 note

## 2025-01-20 ENCOUNTER — TELEPHONE (OUTPATIENT)
Dept: SLEEP MEDICINE | Facility: HOSPITAL | Age: 40
End: 2025-01-20
Payer: MEDICAID

## 2025-01-20 NOTE — TELEPHONE ENCOUNTER
Pt does not want to pursue CPAP at this time but would like to keep her follow up appointment with sleep provider.

## 2025-01-22 NOTE — PROGRESS NOTES
Drea Fajardo  Age: 39 y.o.  MRN: [id number]  Date: 1/14/2025  Location of service: phone call    Program Details  Medicaid Community Clinical Case Management  Status: Enrolled  Effective Dates: 7/16/2024 - present  Responsible Staff: Chana Scruggs RD      Goals Reviewed:      Summary:  This writer called patient to check in regarding her medical conditions. Patient was unable to answer at this time. This writer left a voicemail.    Appointment start time: 1550  Appointment completion time: 1551  Total time spent with patient (in minutes): 1  Non-Billable Time: 1  Billable Time Total: 0    Rachele Shelley RN

## 2025-01-27 ENCOUNTER — DOCUMENTATION (OUTPATIENT)
Dept: BEHAVIORAL HEALTH | Facility: CLINIC | Age: 40
End: 2025-01-27
Payer: MEDICARE

## 2025-01-27 NOTE — PROGRESS NOTES
Drea Fajardo  Age: 39 y.o.  MRN: [id number]  Date: 1/27/2025  Location of service: phone call and care coordination    Program Details  Medicaid Community Clinical Case Management  Status: Enrolled  Effective Dates: 7/16/2024 - present  Responsible Staff: Chana Scruggs RD      Goals Reviewed:    Summary:  Patient states she feels like her Topamax and Wellbutrin aren't working, however she states she will continue the medications at this time.   Patient states she had an episode of uncontrollable laughter and passive SI the week of Jan 19th, however she doesn't remember the exact day. Patient verbalizes being overwhelmed with her mental health. Patient denies SI/HI/AVH at this time. Patient is future oriented. Patient confirms she would like to start counseling but doesn't know where to go.  Patient states she has all of her diabetic supplies, however is unsure of what medications she is supposed to be taking for her diabetes at this time. Patient feels confident in calling her endocrinologist.  Patient states the right side of her head was numb this morning when she woke up. Feeling came back after about 15-20 minutes. Patient states it still feels weird. Patient states she has also had this happen with her legs. Patient attributes this to her fibromyalgia.   Patient states she has been having a lot of pain in her right side. Patient states the pain has moved to her left side as well as of today. Generalized around her arm, shoulder, upper back, neck, and face. Patient suspects this is related to her fibromyalgia. Patient states she will call her PCP office to leave a message with her provider today.  Patient states Joe told her that they did not have the order for Prilosec in their system. This writer called Joe to check, Joe states that the order is ready for her to . This writer calls patient back to inform her.    Appointment start time: 1548  Appointment completion time:  8747  Total time spent with patient (in minutes): 42  Non-Billable Time: 42  Billable Time Total: 0    Rachele Shelley RN

## 2025-01-29 ENCOUNTER — DOCUMENTATION (OUTPATIENT)
Dept: BEHAVIORAL HEALTH | Facility: CLINIC | Age: 40
End: 2025-01-29
Payer: MEDICARE

## 2025-01-29 NOTE — PROGRESS NOTES
Provider called patient on the phone to discuss care coordination and how patient is currently doing. Patient stated that she was currently trying to find a new house and had toured a townhouse but hasn't had heard anything. Provider and patient discussed making a referral to [facility] for community CM. Provider informed patient of benefits of having an in-person CM. Patient stated she would be okay with provider making a referral to [facility]. Provider to make a referral.

## 2025-01-30 ENCOUNTER — HOSPITAL ENCOUNTER (OUTPATIENT)
Dept: RADIOLOGY | Facility: CLINIC | Age: 40
Discharge: HOME | End: 2025-01-30
Payer: MEDICARE

## 2025-01-30 DIAGNOSIS — R41.89 COGNITIVE CHANGES: ICD-10-CM

## 2025-01-30 PROCEDURE — A9575 INJ GADOTERATE MEGLUMI 0.1ML: HCPCS | Performed by: PSYCHIATRY & NEUROLOGY

## 2025-01-30 PROCEDURE — 2550000001 HC RX 255 CONTRASTS: Performed by: PSYCHIATRY & NEUROLOGY

## 2025-01-30 PROCEDURE — 70553 MRI BRAIN STEM W/O & W/DYE: CPT

## 2025-01-30 RX ORDER — GADOTERATE MEGLUMINE 376.9 MG/ML
20 INJECTION INTRAVENOUS
Status: COMPLETED | OUTPATIENT
Start: 2025-01-30 | End: 2025-01-30

## 2025-01-30 RX ADMIN — GADOTERATE MEGLUMINE 20 ML: 376.9 INJECTION INTRAVENOUS at 15:41

## 2025-02-11 ENCOUNTER — DOCUMENTATION (OUTPATIENT)
Dept: BEHAVIORAL HEALTH | Facility: CLINIC | Age: 40
End: 2025-02-11
Payer: MEDICARE
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
## 2025-02-12 ENCOUNTER — DOCUMENTATION (OUTPATIENT)
Dept: BEHAVIORAL HEALTH | Facility: CLINIC | Age: 40
End: 2025-02-12
Payer: MEDICARE

## 2025-02-13 NOTE — PROGRESS NOTES
Drea Fajardo  Age: 39 y.o.  MRN: [id number]  Date: 2/12/2025  Location of service: phone call    Program Details  Medicaid Community Clinical Case Management  Status: Enrolled  Effective Dates: 7/16/2024 - present  Responsible Staff: Chana Scruggs RD      Goals Reviewed:      Summary:  Patient called this writer and asked if she can be seen by a counselor within . Patient states she is not interested in working with [facility] at all. This writer spoke with Dr. Garcia yesterday and he suggested Mariah Velez for counseling. Mariah Velez is agreeable to meeting with the patient. This writer reaches out and lets Mariah and Dr. Garcia know that the patient is interested and is committed to traveling to [place] to meet.   Patient calls this writer again to discuss her appointment at [facility]. Patient states she was very uncomfortable meeting with [facility]. Patient is hoping to find case management services with another facility in [place]. This writer informs patient that this writer spoke with Mariah Velez and that she will reach out to her soon. This writer gave Mariah a brief summary of what is going on with the patient.     Appointment start time: 1202  Appointment completion time: 1230  Total time spent with patient (in minutes): 28  Non-Billable Time: 28  Billable Time Total: 0    Rachele Shelley RN

## 2025-02-14 ENCOUNTER — DOCUMENTATION (OUTPATIENT)
Dept: BEHAVIORAL HEALTH | Facility: CLINIC | Age: 40
End: 2025-02-14
Payer: MEDICARE

## 2025-02-14 NOTE — PROGRESS NOTES
Provider called patient to check in after learning there has been issues with MH service that patient was referred to. Provider left a message.

## 2025-02-21 ENCOUNTER — SOCIAL WORK (OUTPATIENT)
Dept: BEHAVIORAL HEALTH | Facility: CLINIC | Age: 40
End: 2025-02-21
Payer: MEDICARE

## 2025-02-21 ENCOUNTER — APPOINTMENT (OUTPATIENT)
Dept: BEHAVIORAL HEALTH | Facility: CLINIC | Age: 40
End: 2025-02-21
Payer: MEDICARE

## 2025-02-21 DIAGNOSIS — F17.200 TOBACCO USE DISORDER: ICD-10-CM

## 2025-02-21 DIAGNOSIS — F33.1 MODERATE EPISODE OF RECURRENT MAJOR DEPRESSIVE DISORDER: ICD-10-CM

## 2025-02-21 DIAGNOSIS — F43.10 COMPLEX POSTTRAUMATIC STRESS DISORDER: ICD-10-CM

## 2025-02-21 DIAGNOSIS — R41.840 ATTENTION AND CONCENTRATION DEFICIT: ICD-10-CM

## 2025-02-21 PROCEDURE — 3060F POS MICROALBUMINURIA REV: CPT | Performed by: STUDENT IN AN ORGANIZED HEALTH CARE EDUCATION/TRAINING PROGRAM

## 2025-02-21 PROCEDURE — 3046F HEMOGLOBIN A1C LEVEL >9.0%: CPT | Performed by: STUDENT IN AN ORGANIZED HEALTH CARE EDUCATION/TRAINING PROGRAM

## 2025-02-21 PROCEDURE — 3050F LDL-C >= 130 MG/DL: CPT | Performed by: STUDENT IN AN ORGANIZED HEALTH CARE EDUCATION/TRAINING PROGRAM

## 2025-02-21 PROCEDURE — 90791 PSYCH DIAGNOSTIC EVALUATION: CPT | Mod: AJ,95 | Performed by: SOCIAL WORKER

## 2025-02-21 PROCEDURE — 99215 OFFICE O/P EST HI 40 MIN: CPT | Performed by: STUDENT IN AN ORGANIZED HEALTH CARE EDUCATION/TRAINING PROGRAM

## 2025-02-21 RX ORDER — TOPIRAMATE 50 MG/1
50 TABLET, FILM COATED ORAL 2 TIMES DAILY
Qty: 60 TABLET | Refills: 1 | Status: SHIPPED | OUTPATIENT
Start: 2025-02-21 | End: 2025-04-22

## 2025-02-21 RX ORDER — ATOMOXETINE 40 MG/1
CAPSULE ORAL
Qty: 67 CAPSULE | Refills: 0 | Status: SHIPPED | OUTPATIENT
Start: 2025-02-21 | End: 2025-03-30
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
## 2025-02-21 NOTE — ASSESSMENT & PLAN NOTE
Wellbutrin unhelpful. Still interested in trying vaping as an alternative. Feeling too stressed to try quitting with Chantix right now.  - encourage cessation, revisit quit line & Chantix when ready  - vaping as possible/desired

## 2025-02-21 NOTE — Clinical Note
So, the explanation for why [place] didn't work out was pretty borderline - DBT will help mitigate that in the future - until then, any other options for a ?
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
## 2025-02-26 ENCOUNTER — PATIENT MESSAGE (OUTPATIENT)
Dept: BEHAVIORAL HEALTH | Facility: CLINIC | Age: 40
End: 2025-02-26
Payer: MEDICARE

## 2025-02-27 DIAGNOSIS — R41.840 ATTENTION AND CONCENTRATION DEFICIT: ICD-10-CM

## 2025-02-27 RX ORDER — ATOMOXETINE 80 MG/1
80 CAPSULE ORAL DAILY
Qty: 90 CAPSULE | Refills: 0 | Status: SHIPPED | OUTPATIENT
Start: 2025-02-27 | End: 2025-05-28

## 2025-02-27 NOTE — PROGRESS NOTES
Patient: Drea Fajardo  : 1985 AGE: 39 y.o. SEX:female   MRN: [id number]   Provider: ALLY Hurt     Location [facility]   Service Date: 3/6/2025     PCP: Hany Sabillon MD   Referred by: No ref. provider found          [facility]  Follow Up Visit Note    Virtual or Telephone Consent  An interactive audio and video telecommunication system which permits real time communications between the patient (at the originating site) and provider (at the distant site) was utilized to provide this telehealth service.   Verbal consent was requested and obtained from Drea Fajardo on this date, 25 for a telehealth visit.      HISTORY OF PRESENT ILLNESS     Drea Fajardo is a 39 y.o. female with a h/o  MEKA, Obesity, DM, HLD, HIV, ADHD/Depression/PTSD/ bipolar disorder, current smoker  who presents to [facility].    3/6/25: Sleeping much better since moving to new apartment. Doing better with regulating sleep schedule.   Just got a job at [employer]  staff (will be working part time). Will start at 5AM. 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 Functioning well during the day.     24: NPV with concerns of MEKA management. Patient was diagnosed with MEKA in 2019 by PSG and was using CPAP intermittently for 2 years then stopped due to removing mask in her sleep without her knowledge. Previously found benefit with CPAP use. No longer has CPAP machine. Now more symptomatic with snoring, night time awakenings, un refreshed sleep, and EDS. Reports vivid dreams, nocturnal enuresis (3 episodes in the past 2 years), and rare sleep paralysis.     Sleeps alone. Sleep schedule varies. Sometimes gets into bed at 10AM, other times 9PM. Reports TST 4-5 hours/night. Takes multiple naps during the day. Always wakes un refreshed. Gets drowsy while driving. Pulls over to take a nap. ----> split PSG  ordered     SLEEP STUDY HISTORY (personally reviewed raw data such as interpretation report, data sheet, hypnogram, and titration table if available and applicable)  -Split PSG 4/13/2019-showing mild MEKA with AHI 7.3, SpO2 cecelia 90%.  Patient was titrated from CPAP 5 to 10 cm H2O.  Recommend CPAP 10 ResMed Quattro medium fullface mask medium  - PSG 1/11/2025-showing no MEKA on CMS.  With RDI 4% 2.5, mild MEKA on AASM criteria with RDI 3% 8.9, SpO2 cecelia 92%    Breathing during sleep: snoring, witnessed apneas, gasping/choking for air, and wake with palpitations  Behaviors at night: Yes   Sleep paralysis: Yes, rare   Hypnogogic or hypnopompic hallucinations: No   Cataplexy: No     RLS screen: Patient admits having urge to move legs that occurs at rest (sitting, getting into bed, or lying n bed), worse in the evening, and relieved temporarily with movement.   Frequency of RLS symptoms: 1-2x/month  RLS symptoms occurring in daytime: No   RLS symptoms progressing to arms: No   RLS symptoms affect sleep onset: No   RLS symptoms wakes patient up from sleep: No   Current or past treatment for RLS: denies    Daytime Symptoms:  On awakening patient reports: wake unrefreshed, feels sleepy, morning headaches, morning dry mouth, morning sore throat, and hard to get out of bed  Patient report some daytime symptoms including: DAYTIME SYMPTOMS: reports excessive daytime sleepiness sleep inertia irritability during the day difficulty with memory or concentration during the day feeling sleepy when driving    Sleep environment:  Preferred sleep position: back and stomach  Room is dark: Yes  Room is quiet: Yes  Room is cool: Yes  Bed comfort: good    SLEEP HABITS  Caffeine consumption: Yes, 1-2 cups/day  Alcohol consumption: No  Smoking: Yes, smoking 1.5 PPD   Marijuana: No  Sleep aids: denies     WEIGHT: lost 90+ lbs in 1 year      ESS: 12    REVIEW OF SYSTEMS     All other systems have been reviewed and are negative.    ALLERGIES      Allergies   Allergen Reactions    Duloxetine Hcl Syncope     Syncope    Hydrocodone-Acetaminophen Hives    Lanolin Unknown    Oxycodone-Acetaminophen Other    Wool Unknown    Morphine Unknown and Rash       MEDICATIONS     Current Outpatient Medications   Medication Sig Dispense Refill    acetaminophen (Tylenol) 500 mg tablet Take 2 tablets (1,000 mg) by mouth every 6 hours.      atomoxetine (Strattera) 80 mg capsule Take 1 capsule (80 mg) by mouth once daily. Swallow capsule whole; do not open. If opened accidentally, do not touch eyes; wash hands immediately (product is an eye irritant). 90 capsule 0    azelastine (Astelin) 137 mcg (0.1 %) nasal spray Administer 2 sprays into affected nostril(s) once daily.      Biktarvy -25 mg tablet Take 1 tablet by mouth once daily.      blood sugar diagnostic (OneTouch Verio test strips) strip 1 strip once daily. 100 strip 3    blood-glucose meter (OneTouch Verio Flex meter) misc 1 each if needed (Use to test blood sugar as directed). 1 each 0    cephalexin (Keflex) 500 mg capsule       COVID-19 antigen test (BinaxNOW COVID-19 Ag Self Test) kit TEST AS DIRECTED TODAY      diphenhydrAMINE (Benadryl Allergy) 25 mg tablet Take 1 tablet (25 mg) by mouth every 6 hours if needed for itching or allergies.      doxycycline (Vibramycin) 50 mg capsule Take one pill by by mouth once daily. Take with at least 8 ounces (large glass) of water, do not lie down for 30 minutes after 30 capsule 2    duloxetine HCl (DULOXETINE ORAL)       glucose 4 gram chewable tablet Chew 4 tablets (16 g) if needed for low blood sugar - see comments. 40 tablet 11    ibuprofen 800 mg tablet Take 1 tablet (800 mg) by mouth.      insulin degludec (Tresiba FlexTouch U-100) 100 unit/mL (3 mL) injection Inject 40 units once daily 45 mL 3    lancets 30 gauge misc 1 Lancet if needed (Daily as directed to check blood sugar). 100 each 3    metformin HCl (METFORMIN ORAL)       metFORMIN XR (Glucophage-XR) 500  "mg 24 hr tablet Take 1 tablet (500 mg) by mouth 2 times a day with meals. Do not crush, chew, or split. 180 tablet 3    mupirocin (Bactroban) 2 % ointment Apply to the inside of the nostrils 3 times daily. Pinch shut for 10 seconds. Do this for 10 days. 22 g 0    omeprazole (PriLOSEC) 40 mg DR capsule Take 1 capsule (40 mg) by mouth once daily in the morning. Take before meals. 90 capsule 0    pen needle, diabetic 32 gauge x 5/32\" needle Use with insulin injection once daily 100 each 3    semaglutide (Ozempic) 0.25 mg or 0.5 mg (2 mg/3 mL) pen injector Inject 0.25 mg under the skin every 7 days for 30 days, THEN 0.5 mg every 7 days. 9 mL 3    Dexcom G7 Sensor device Use 1 sensor as directed every 10 days to monitor glucose. If needed, Dexcom's website offers free patches to help keep sensor in place 9 each 3    topiramate (Topamax) 50 mg tablet Take 1 tablet (50 mg) by mouth 2 times a day. 60 tablet 1     Current Facility-Administered Medications   Medication Dose Route Frequency Provider Last Rate Last Admin    semaglutide (Ozempic) injection 0.25 mg  0.25 mg subcutaneous Once Hany Sabillon MD           PAST HISTORIES     PERTINENT PAST MEDICAL HISTORY: See HPI    PERTINENT PAST SURGICAL HISTORY for Sleep Medicine:  non-contributory    PERTINENT FAMILY HISTORY for Sleep Medicine:  Patient denies family history of any sleep disorder.    PERTINENT SOCIAL HISTORY:  She  reports that she has been smoking cigarettes. She has never used smokeless tobacco. She reports that she does not currently use alcohol. She reports that she does not use drugs. She currently lives alone.    Active Problems, Allergy List, Medication List, and PMH/PSH/FH/Social Hx have been reviewed and reconciled in chart. No significant changes unless documented in the pertinent chart section. Updates made when necessary.     PHYSICAL EXAM     VITAL SIGNS: LMP 07/16/2022     CURRENT WEIGHT: There were no vitals filed for this visit. "     PREVIOUS WEIGHTS:  Wt Readings from Last 3 Encounters:   01/11/25 98 kg (216 lb 0.8 oz)   01/10/25 98 kg (216 lb 0.8 oz)   06/18/24 95.3 kg (210 lb)     Limited telehealth examination  PHYSICAL EXAMINATION  General appearance: awake alert in NAD  Affect: normal  HEENT: Nasal congestion absent  Lungs: no cough.  Neuro: normal speech      RESULTS/DATA     Iron (UG/DL)   Date Value   03/26/2019 82     Iron Saturation (%)   Date Value   03/26/2019 24.6     TIBC (UG/DL)   Date Value   03/26/2019 333       Bicarbonate   Date Value Ref Range Status   01/17/2025 24 21 - 32 mmol/L Final       ASSESSMENT/PLAN     Ms. Fajardo is a 39 y.o. female and She was referred to the [facility] for evaluation of MEKA    Problem List, Orders, Assessment, Recommendations:    #Hx MEKA, resolved  - Split PSG 4/13/2019-showing mild MEKA with AHI 7.3, SpO2 cecelia 90%.  Patient was titrated from CPAP 5 to 10 cm H2O.  Recommend CPAP 10 ResMed Quattro medium fullface mask medium  - PSG 1/11/2025-showing no MEKA on CMS.  With RDI 4% 2.5, mild MEKA on AASM criteria with RDI 3% 8.9, SpO2 cecelia 92%  - Personally reviewed the sleep study's raw data such as interpretation report, data sheet, and hypnogram. Discussed sleep study results with patient today. A copy of recent testing was either given to patient or released in [facility].     #Hx Nocturnal enuresis/sleep paralysis/poor sleep hygiene  - No episodes for >6months   - Discussed sleep hygiene and consistent sleep schedule     #ADHD/PTSD  - On meds, defer management to psychiatry      #Obesity  BMI Readings from Last 1 Encounters:   01/11/25 34.89 kg/m²     - Encouraged healthy weight loss via diet and exercise  - Weight loss can help in the long term treatment of MEKA.  - Defer management to PCP     #Tobacco use disorder  - Smokes 1.5 PPD (again smoking during virtual visit today)  - Has tried nicotine patches in the past  - No longer on Wellbutrin  mg BID  -  Smoking cessation strongly encouraged     All of patient's questions were answered. She verbalizes understanding and agreement with my assessment and plan.    Disposition    Follow-up as needed     I personally spent 30 minutes today (exclusive of procedures) providing care for this patient, including preparation, face to face time, EMR documentation and other services such as review of medical records, diagnostic results, patient education, counseling, and coordination of care.

## 2025-02-28 ENCOUNTER — APPOINTMENT (OUTPATIENT)
Dept: BEHAVIORAL HEALTH | Facility: CLINIC | Age: 40
End: 2025-02-28
Payer: MEDICARE

## 2025-02-28 ENCOUNTER — DOCUMENTATION (OUTPATIENT)
Dept: BEHAVIORAL HEALTH | Facility: CLINIC | Age: 40
End: 2025-02-28

## 2025-02-28 DIAGNOSIS — F33.1 MODERATE EPISODE OF RECURRENT MAJOR DEPRESSIVE DISORDER: ICD-10-CM

## 2025-02-28 DIAGNOSIS — F43.10 COMPLEX POSTTRAUMATIC STRESS DISORDER: ICD-10-CM

## 2025-02-28 PROCEDURE — 90837 PSYTX W PT 60 MINUTES: CPT | Mod: AJ,95 | Performed by: SOCIAL WORKER

## 2025-03-05 ENCOUNTER — DOCUMENTATION (OUTPATIENT)
Dept: BEHAVIORAL HEALTH | Facility: CLINIC | Age: 40
End: 2025-03-05
Payer: MEDICARE

## 2025-03-05 NOTE — PROGRESS NOTES
Provider called patient about checking in with this provider about the Personalized Care Program. Provider left a VM.

## 2025-03-06 ENCOUNTER — DOCUMENTATION (OUTPATIENT)
Dept: BEHAVIORAL HEALTH | Facility: CLINIC | Age: 40
End: 2025-03-06

## 2025-03-06 ENCOUNTER — APPOINTMENT (OUTPATIENT)
Facility: CLINIC | Age: 40
End: 2025-03-06
Payer: COMMERCIAL

## 2025-03-06 DIAGNOSIS — G47.19 EXCESSIVE DAYTIME SLEEPINESS: Primary | ICD-10-CM

## 2025-03-06 DIAGNOSIS — F17.210 CIGARETTE SMOKER: ICD-10-CM

## 2025-03-06 DIAGNOSIS — E66.9 OBESITY (BMI 30-39.9): ICD-10-CM

## 2025-03-06 DIAGNOSIS — Z72.821 INADEQUATE SLEEP HYGIENE: ICD-10-CM

## 2025-03-06 PROCEDURE — 3050F LDL-C >= 130 MG/DL: CPT | Performed by: NURSE PRACTITIONER

## 2025-03-06 PROCEDURE — 3060F POS MICROALBUMINURIA REV: CPT | Performed by: NURSE PRACTITIONER

## 2025-03-06 PROCEDURE — G8433 SCR FOR DEP NOT CPT DOC RSN: HCPCS | Performed by: NURSE PRACTITIONER

## 2025-03-06 PROCEDURE — 99214 OFFICE O/P EST MOD 30 MIN: CPT | Performed by: NURSE PRACTITIONER

## 2025-03-06 PROCEDURE — 3046F HEMOGLOBIN A1C LEVEL >9.0%: CPT | Performed by: NURSE PRACTITIONER

## 2025-03-06 PROCEDURE — 4004F PT TOBACCO SCREEN RCVD TLK: CPT | Performed by: NURSE PRACTITIONER

## 2025-03-06 ASSESSMENT — SLEEP AND FATIGUE QUESTIONNAIRES
HOW LIKELY ARE YOU TO NOD OFF OR FALL ASLEEP WHILE WATCHING TV: SLIGHT CHANCE OF DOZING
HOW LIKELY ARE YOU TO NOD OFF OR FALL ASLEEP WHILE SITTING AND TALKING TO SOMEONE: WOULD NEVER DOZE
HOW LIKELY ARE YOU TO NOD OFF OR FALL ASLEEP WHILE SITTING QUIETLY AFTER LUNCH WITHOUT ALCOHOL: MODERATE CHANCE OF DOZING
HOW LIKELY ARE YOU TO NOD OFF OR FALL ASLEEP WHILE LYING DOWN TO REST IN THE AFTERNOON WHEN CIRCUMSTANCES PERMIT: HIGH CHANCE OF DOZING
HOW LIKELY ARE YOU TO NOD OFF OR FALL ASLEEP IN A CAR, WHILE STOPPED FOR A FEW MINUTES IN TRAFFIC: SLIGHT CHANCE OF DOZING
ESS-CHAD TOTAL SCORE: 12
HOW LIKELY ARE YOU TO NOD OFF OR FALL ASLEEP WHEN YOU ARE A PASSENGER IN A CAR FOR AN HOUR WITHOUT A BREAK: SLIGHT CHANCE OF DOZING
SITING INACTIVE IN A PUBLIC PLACE LIKE A CLASS ROOM OR A MOVIE THEATER: SLIGHT CHANCE OF DOZING
HOW LIKELY ARE YOU TO NOD OFF OR FALL ASLEEP WHILE SITTING AND READING: HIGH CHANCE OF DOZING

## 2025-03-06 ASSESSMENT — ENCOUNTER SYMPTOMS
OCCASIONAL FEELINGS OF UNSTEADINESS: 1
DEPRESSION: 0
LOSS OF SENSATION IN FEET: 1

## 2025-03-06 NOTE — PROGRESS NOTES
Drea Fajardo  Age: 39 y.o.  MRN: [id number]  Date: 3/6/2025  Location of service: phone call    Program Details  Medicaid Community Clinical Case Management  Status: Enrolled  Effective Dates: 7/16/2024 - present  Responsible Staff: Chana Scruggs RD      Summary:  Provider called patient on the phone. Provider utilized empathic listening to facilitate a discussion with patient about how she is currently doing. Patient reports that she recently moved into a new apartment and has found a part time job. Provider and patient discussed patient's recent insurance change and whether or not she will be able to keep seeing her current therapist. Provider to contact therapist about new insurance.     Appointment start time: 1105  Appointment completion time: 1150  Total time spent with patient (in minutes): 45  Non-Billable Time: 45  Billable Time Total: 0    HIRA Schmidt

## 2025-03-08 NOTE — PROGRESS NOTES
Drea Fajardo  Age: 39 y.o.  MRN: [id number]  Date: 2/28/2025  Location of service: phone call    Program Details  Medicaid Community Clinical Case Management  Status: Enrolled  Effective Dates: 7/16/2024 - present  Responsible Staff: Chana Scruggs RD      Goals Reviewed:      Summary:  Patient called this writer, she states she is concerned because she had to pick new insurance and she is concerned her Strattera won't be covered. Patient states she sent a message to Dr. Garcia regarding getting a longer duration refill in case there is an issue with her getting the medication when her insurance changes. Patient states she will keep this writer updated with any changes when she fills the prescription.    Appointment start time: 1505  Appointment completion time: 1520  Total time spent with patient (in minutes): 15  Non-Billable Time: 15  Billable Time Total: 0    Rachele Shelley RN

## 2025-03-10 NOTE — PROGRESS NOTES
Individual Psychotherapy    Time           11:00am  End             12:00pm     Name:  Drea Fajardo (Katie)      :     1985    Pt joined session via HIPAA-compliant telehealth platform. Pt provided with informed consent.    Referred to therapy by: Dr. Garcia/Rachele Shelley (Clinical Case Management team)  Patient Location: Pt joined session from her home.  Clinician Location: Clinician located at  behavioral health hub  Chief complaint: Anxiety  Diagnoses: MDD, CPTSD  Symptoms: Hypervigilance, difficulty controlling worry, irritability, somatic symptoms, difficulty concentrating, depressed mood  Mental status: Pt presents alert and oriented x3  Functional status: Pt's skills in affect regulation, ability to concentrate are moderately impaired due to mental health symptoms  Treatment modality/frequency: Trauma-informed, DBT-informed - weekly. Today- continued assessment of pt symptoms, family history, resources, strengths/coping strategies. Utilized genogram as tool for family history taking.  Treatment Plan/Recommendations: Pt to engage in DBT-informed therapy - identify specific behavioral targets and create mutual treatment plan in coming sessions. Likely plan for DBT skills training within session.  Progress since last session: Minimal - pt moved into her new apartment. Shared about significant impact of psychosocial stressors on her mental health.    HIRA Polanco

## 2025-03-12 ENCOUNTER — DOCUMENTATION (OUTPATIENT)
Dept: BEHAVIORAL HEALTH | Facility: CLINIC | Age: 40
End: 2025-03-12
Payer: MEDICAID

## 2025-03-12 ENCOUNTER — DOCUMENTATION (OUTPATIENT)
Dept: BEHAVIORAL HEALTH | Facility: CLINIC | Age: 40
End: 2025-03-12
Payer: MEDICARE

## 2025-03-12 NOTE — PROGRESS NOTES
Provider called patient about getting [employer] insurance information to see if patient is still in network. Provider left a VM.

## 2025-03-17 ENCOUNTER — DOCUMENTATION (OUTPATIENT)
Dept: BEHAVIORAL HEALTH | Facility: CLINIC | Age: 40
End: 2025-03-17

## 2025-03-17 ENCOUNTER — APPOINTMENT (OUTPATIENT)
Dept: PRIMARY CARE | Facility: CLINIC | Age: 40
End: 2025-03-17
Payer: COMMERCIAL

## 2025-03-17 NOTE — PROGRESS NOTES
Provider called patient about moving appointment with Dr. Garcia from Friday to Thursday of this week and about refills she had asked about earlier and about concerns she had expressed to MADHAV Shelley earlier in the day. Provider left a VM.

## 2025-03-17 NOTE — PROGRESS NOTES
"Drea Fajardo  Age: 39 y.o.  MRN: [id number]  Date: 3/17/2025  Location of service: phone call    Program Details  Medicaid Community Clinical Case Management  Status: Enrolled  Effective Dates: 7/16/2024 - present  Responsible Staff: Chana Scruggs RD      Goals Reviewed:      Summary:  This writer calls patient after receiving a voicemail from the patient stating she feels she is going \"downhill.\"  Patient voices frustrations with her current job. Patient states she feels she is being treated unfairly.  Patient states she is scheduled to work at the time of her appointment with Dr. Garcia on Friday. This writer reaches out to Drea INIGUEZ to see if there are earlier appointments available that do not interfere with patient's work schedule. Patient is scheduled for 11:30am on Thursday.  Patient states she has decided to stop taking her HIV medications and states she intends to let the disease progress. This writer expresses great concern related to this decision, but patient states it's her decision to make.  This writer requests to discuss this with [facility] the clinical supervisor on our team. Patient states she does not want this writer to discuss with the provider at this time. This writer recieves guidance for assessing the patient from Drea INIGUEZ.  Patient denies having any desire or plan to harm herself or anyone else at this time. Patient is not an immediate danger to herself or anyone else. This writer instructs patient to call 911 and go to the nearest emergency room should she have thoughts of harming herself or anyone else.    Appointment start time: 1334  Appointment completion time: 1423  Total time spent with patient (in minutes): 49  Non-Billable Time: 49  Billable Time Total: 0    Rachele Shelley RN    "

## 2025-03-20 ENCOUNTER — TELEMEDICINE (OUTPATIENT)
Dept: BEHAVIORAL HEALTH | Facility: CLINIC | Age: 40
End: 2025-03-20
Payer: MEDICARE

## 2025-03-20 DIAGNOSIS — F17.200 TOBACCO USE DISORDER: ICD-10-CM

## 2025-03-20 DIAGNOSIS — Z51.81 ENCOUNTER FOR THERAPEUTIC DRUG LEVEL MONITORING: ICD-10-CM

## 2025-03-20 DIAGNOSIS — F90.2 ATTENTION DEFICIT HYPERACTIVITY DISORDER (ADHD), COMBINED TYPE: ICD-10-CM

## 2025-03-20 DIAGNOSIS — F43.10 COMPLEX POSTTRAUMATIC STRESS DISORDER: ICD-10-CM

## 2025-03-20 DIAGNOSIS — K21.9 GASTROESOPHAGEAL REFLUX DISEASE, UNSPECIFIED WHETHER ESOPHAGITIS PRESENT: ICD-10-CM

## 2025-03-20 DIAGNOSIS — R41.840 ATTENTION AND CONCENTRATION DEFICIT: ICD-10-CM

## 2025-03-20 PROCEDURE — 3060F POS MICROALBUMINURIA REV: CPT | Performed by: STUDENT IN AN ORGANIZED HEALTH CARE EDUCATION/TRAINING PROGRAM

## 2025-03-20 PROCEDURE — 3050F LDL-C >= 130 MG/DL: CPT | Performed by: STUDENT IN AN ORGANIZED HEALTH CARE EDUCATION/TRAINING PROGRAM

## 2025-03-20 PROCEDURE — 99215 OFFICE O/P EST HI 40 MIN: CPT | Performed by: STUDENT IN AN ORGANIZED HEALTH CARE EDUCATION/TRAINING PROGRAM

## 2025-03-20 PROCEDURE — 3046F HEMOGLOBIN A1C LEVEL >9.0%: CPT | Performed by: STUDENT IN AN ORGANIZED HEALTH CARE EDUCATION/TRAINING PROGRAM

## 2025-03-20 RX ORDER — OMEPRAZOLE 40 MG/1
40 CAPSULE, DELAYED RELEASE ORAL
Qty: 90 CAPSULE | Refills: 0 | Status: SHIPPED | OUTPATIENT
Start: 2025-03-20 | End: 2025-06-18
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
## 2025-03-20 NOTE — PATIENT INSTRUCTIONS
Please send me a message in [employer] if things aren't going as expected or you are unsure about something we discussed. If this isn't working, you can call the psychiatry department line at [phone], or leave me a voicemail at [phone].  If you are having thoughts of harming yourself or ending your life, please call the national suicide hotline 24/7 at 923. For this and other mental health emergencies, such as losing touch with reality, call the [facility] 24/7 mobile crisis hotline at 177-236-5373, or dial 911 for emergency services.

## 2025-03-20 NOTE — ASSESSMENT & PLAN NOTE
Out of PPI, having trouble reaching PCP and next appointment in 2 months. Was using 80 mg to counteract increased reflux with Strattera.  - counseled that 80 mg is too high a dose to continue  - I will provide another refill as she is now going to run out before seeing her PCP

## 2025-03-20 NOTE — ASSESSMENT & PLAN NOTE
Negative neuro workup, good response to Strattera, and consistent symptoms including difficulty organizing, difficulty focusing on conversations, constantly misplacing or losing things, makes a strong case for ADHD. MEKA resolved per 2024 sleep study and MRI brain unremarkable as of 1/2025. Strattera effective at 80 mg daily but caused intolerable acid reflux.  - stop Strattera  - Pending UDS, start Adderall XR 10 mg daily
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
## 2025-03-21 ENCOUNTER — APPOINTMENT (OUTPATIENT)
Dept: BEHAVIORAL HEALTH | Facility: CLINIC | Age: 40
End: 2025-03-21
Payer: MEDICARE

## 2025-03-21 NOTE — PROGRESS NOTES
Drea Fajardo  Age: 39 y.o.  MRN: [id number]  Date: 3/12/2025  Location of service: phone call    Program Details  Medicaid Community Clinical Case Management  Status: Enrolled  Effective Dates: 7/16/2024 - present  Responsible Staff: Chana Scruggs RD      Goals Reviewed:      Summary:  This writer speaks with patient over the phone to retrieve her new insurance plan information. Patient is able to provide this information at this time.  This writer emailed the information to Mariah Jenkins and Drea Akhtar.  Patient discusses with writer her new job and her excitement to be able to work.  Patient discusses her new living arrangements with this writer.  Patient discusses her continued frustration with her previous landlord.  This writer provides empathetic listening and therapeutic communication.    Appointment start time: 1025  Appointment completion time: 1105  Total time spent with patient (in minutes): 40  Non-Billable Time: 40  Billable Time Total: 0    Rachele Shelley RN

## 2025-03-24 ENCOUNTER — TELEPHONE (OUTPATIENT)
Dept: OTHER | Age: 40
End: 2025-03-24

## 2025-03-24 ENCOUNTER — DOCUMENTATION (OUTPATIENT)
Dept: BEHAVIORAL HEALTH | Facility: CLINIC | Age: 40
End: 2025-03-24

## 2025-03-24 ENCOUNTER — APPOINTMENT (OUTPATIENT)
Dept: PRIMARY CARE | Facility: CLINIC | Age: 40
End: 2025-03-24
Payer: COMMERCIAL

## 2025-04-02 ENCOUNTER — DOCUMENTATION (OUTPATIENT)
Dept: BEHAVIORAL HEALTH | Facility: CLINIC | Age: 40
End: 2025-04-02
Payer: MEDICAID
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
## 2025-04-03 NOTE — PROGRESS NOTES
Drea Fajardo  Age: 39 y.o.  MRN: [id number]  Date: 3/24/2025  Location of service: phone call    Program Details  Medicaid Community Clinical Case Management  Status: Enrolled  Effective Dates: 7/16/2024 - present  Responsible Staff: Chana Scruggs RD      Goals Reviewed:      Summary:  Patient calls this writer and verbalizes frustration over her job. Patient states she had a low blood sugar this morning and was not able to go into work on time. Patient states her employer told her to take the entire week off. Patient is on her way to get documentation from her PCP regarding her condition to provide to her employer.    Appointment start time: 1336  Appointment completion time: 1350  Total time spent with patient (in minutes): 14  Non-Billable Time: 14  Billable Time Total: 0    Rachele Shelley RN

## 2025-04-24 ENCOUNTER — APPOINTMENT (OUTPATIENT)
Dept: BEHAVIORAL HEALTH | Facility: CLINIC | Age: 40
End: 2025-04-24
Payer: MEDICARE

## 2025-06-05 ENCOUNTER — DOCUMENTATION (OUTPATIENT)
Dept: BEHAVIORAL HEALTH | Facility: CLINIC | Age: 40
End: 2025-06-05
Payer: MEDICARE

## 2025-06-10 NOTE — PROGRESS NOTES
Drea Fajardo  Age: 39 y.o.  MRN: [id number]  Date: 6/5/2025  Location of service: phone call    Program Details  Medicaid Community Clinical Case Management  Status: Enrolled  Effective Dates: 7/16/2024 - present  Responsible Staff: Chana Scruggs RD      Goals Reviewed:      Summary:  This writer calls patient about continued need for the personalized care program. This writer leaves patient a voicemail stating if this writer does not hear back from the patient by 6/12, this writer will assume the patient is no longer interested in the personalized care program at this time.    Appointment start time: 1338  Appointment completion time: 1339  Total time spent with patient (in minutes): 1  Non-Billable Time: 1  Billable Time Total: 0    Rachele Shelley RN

## 2025-06-19 ENCOUNTER — DOCUMENTATION (OUTPATIENT)
Dept: BEHAVIORAL HEALTH | Facility: CLINIC | Age: 40
End: 2025-06-19
Payer: MEDICARE

## 2025-06-24 NOTE — PROGRESS NOTES
Drea Fajardo  Age: 39 y.o.  MRN: [id number]  Date: 6/19/2025  Location of service: phone call    Program Details  Medicaid Community Clinical Case Management  Status: Enrolled  Effective Dates: 7/16/2024 - present  Responsible Staff: Chana Scruggs RD      Goals Reviewed:      Summary:  This writer speak with patient over the phone to get patient scheduled for an appointment with this writer.  This writer and patient scheduled virtual appointment at this time.  Patient states she hopes to meet with Dr. Ugarte soon.  This writer will speak with the team about getting patient scheduled with Dr. Ugarte.  Patient states she has been very busy, as her mother was recently diagnosed with cancer.  Patient states she has been working a lot.  Patient verbalizes understanding of this writer's focus on health goals.    Appointment start time: 0925  Appointment completion time: 1002  Total time spent with patient (in minutes): 37  Non-Billable Time: 37  Billable Time Total: 0    Rachele Shelley RN

## 2025-06-30 ENCOUNTER — DOCUMENTATION (OUTPATIENT)
Dept: BEHAVIORAL HEALTH | Facility: CLINIC | Age: 40
End: 2025-06-30
Payer: MEDICARE

## 2025-07-01 ENCOUNTER — DOCUMENTATION (OUTPATIENT)
Dept: BEHAVIORAL HEALTH | Facility: CLINIC | Age: 40
End: 2025-07-01
Payer: MEDICARE

## 2025-07-01 NOTE — PROGRESS NOTES
Drea Fajardo  Age: 39 y.o.  MRN: [id number]  Date: 6/30/2025  Location of service: phone call    Program Details  Medicaid Community Clinical Case Management  Status: Enrolled  Effective Dates: 7/16/2024 - present  Responsible Staff: Chana Scruggs RD      Goals Reviewed:      Summary:  This writer calls patient to inform her that this writer will need to cancel our appointment for tomorrow d/t a training. Patient is unable to answer at this time. This writer leaves a voicemail. This writer plans to meet with patient at our next scheduled appointment on 7/16.    Appointment start time: 1405  Appointment completion time: 1406  Total time spent with patient (in minutes): 1  Non-Billable Time: 1  Billable Time Total: 0    Rachele Shelley RN

## 2025-07-03 ENCOUNTER — DOCUMENTATION (OUTPATIENT)
Dept: BEHAVIORAL HEALTH | Facility: CLINIC | Age: 40
End: 2025-07-03
Payer: MEDICARE

## 2025-07-09 ENCOUNTER — APPOINTMENT (OUTPATIENT)
Dept: BEHAVIORAL HEALTH | Facility: CLINIC | Age: 40
End: 2025-07-09
Payer: MEDICARE

## 2025-07-09 ENCOUNTER — DOCUMENTATION (OUTPATIENT)
Dept: BEHAVIORAL HEALTH | Facility: CLINIC | Age: 40
End: 2025-07-09
Payer: MEDICARE

## 2025-07-09 RX ORDER — BACLOFEN 20 MG
500 TABLET ORAL DAILY
COMMUNITY

## 2025-07-09 RX ORDER — CYANOCOBALAMIN (VITAMIN B-12) 1000MCG/15
1000 LIQUID (ML) ORAL DAILY
COMMUNITY

## 2025-07-09 RX ORDER — CALCIUM CARB, CITRATE, MALATE 250 MG
99 CAPSULE ORAL DAILY
COMMUNITY

## 2025-07-09 NOTE — PROGRESS NOTES
Drea Fajardo  Age: 40 y.o.  MRN: [id number]  Date: 7/3/2025  Location of service: phone call    Program Details  Medicaid Community Clinical Case Management  Status: Enrolled  Effective Dates: 7/16/2024 - present  Responsible Staff: Chana Scruggs RD      Goals Reviewed:      Summary:  This writer called patient back after patient called this writer.  Patient is unable to answer at this time.  This writer leaves a voicemail.    Appointment start time: 1118  Appointment completion time: 1119  Total time spent with patient (in minutes): 1  Non-Billable Time: 1  Billable Time Total: 0    Rachele Shelley RN

## 2025-07-09 NOTE — PROGRESS NOTES
Drea Fajardo  Age: 40 y.o.  MRN: [id number]  Date: 7/1/2025  Location of service: phone call    Program Details  Medicaid Community Clinical Case Management  Status: Enrolled  Effective Dates: 7/16/2024 - present  Responsible Staff: Chana Scruggs RD      Goals Reviewed:      Summary:  This writer called patient to inform her that this writer will be unable to meet for a virtual appointment tomorrow due to this writer being in training.  This writer and patient plan to reschedule for Wednesday, July 16.    Appointment start time: 1607  Appointment completion time: 1618  Total time spent with patient (in minutes): 11  Non-Billable Time: 11  Billable Time Total: 0    Rachele Shelley RN

## 2025-07-10 ENCOUNTER — TELEMEDICINE (OUTPATIENT)
Dept: BEHAVIORAL HEALTH | Facility: CLINIC | Age: 40
End: 2025-07-10
Payer: MEDICARE

## 2025-07-10 DIAGNOSIS — F43.10 COMPLEX POSTTRAUMATIC STRESS DISORDER: ICD-10-CM

## 2025-07-10 DIAGNOSIS — F17.200 TOBACCO USE DISORDER: ICD-10-CM

## 2025-07-10 DIAGNOSIS — Z51.81 ENCOUNTER FOR THERAPEUTIC DRUG LEVEL MONITORING: ICD-10-CM

## 2025-07-10 DIAGNOSIS — F90.2 ATTENTION DEFICIT HYPERACTIVITY DISORDER (ADHD), COMBINED TYPE: ICD-10-CM

## 2025-07-10 PROCEDURE — 3050F LDL-C >= 130 MG/DL: CPT | Performed by: STUDENT IN AN ORGANIZED HEALTH CARE EDUCATION/TRAINING PROGRAM

## 2025-07-10 PROCEDURE — 3060F POS MICROALBUMINURIA REV: CPT | Performed by: STUDENT IN AN ORGANIZED HEALTH CARE EDUCATION/TRAINING PROGRAM

## 2025-07-10 PROCEDURE — 3046F HEMOGLOBIN A1C LEVEL >9.0%: CPT | Performed by: STUDENT IN AN ORGANIZED HEALTH CARE EDUCATION/TRAINING PROGRAM

## 2025-07-10 PROCEDURE — 99215 OFFICE O/P EST HI 40 MIN: CPT | Performed by: STUDENT IN AN ORGANIZED HEALTH CARE EDUCATION/TRAINING PROGRAM

## 2025-07-10 NOTE — ASSESSMENT & PLAN NOTE
Negative neuro workup, good response to Strattera, and consistent symptoms including difficulty organizing, difficulty focusing on conversations, constantly misplacing or losing things, makes a strong case for ADHD. MEKA resolved per 2024 sleep study and MRI brain unremarkable as of 1/2025. Strattera effective at 80 mg daily but caused intolerable acid reflux.  - Pending UDS, start Adderall XR 10 mg daily
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
## 2025-07-10 NOTE — PATIENT INSTRUCTIONS
Please send me a message in [employer] if things aren't going as expected or you are unsure about something we discussed. If this isn't working, you can call the psychiatry department line at [phone], or leave me a voicemail at [phone].  If you are having thoughts of harming yourself or ending your life, please call the national suicide hotline 24/7 at 136. For this and other mental health emergencies, such as losing touch with reality, call the [facility] 24/7 mobile crisis hotline at 238-570-5861, or dial 911 for emergency services.

## 2025-07-10 NOTE — ASSESSMENT & PLAN NOTE
Wellbutrin unhelpful. Still interested in trying vaping as an alternative. Feeling too stressed to try quitting with Chantix.  - encourage cessation, revisit quit line & Chantix when ready  - vaping as possible/desired

## 2025-07-12 NOTE — PROGRESS NOTES
.Drea Fajardo  Age: 40 y.o.  MRN: [id number]  Date: 7/9/2025  Location of service: virtual    Program Details  Medicaid Community Clinical Case Management  Status: Enrolled  Effective Dates: 7/16/2024 - present  Responsible Staff: Chana Scruggs RD      Goals Reviewed:      Summary:  This writer met with patient for a virtual appointment.   Patient discusses frustration related to her housing situation. This writer provides empathetic listening.  This writer assists patient with calling and scheduling an appointment with her endocrinologist.  This writer leaves a voicemail for the endocrinologist office.     BILLABLE  Physical Health:  Patient complains at this time of having a severe toothache. Patient states she will see the dentist next Wednesday.     Medications:  This writer completes medication reconciliation with the patient.  See med list below:      Medications Ordered Prior to Encounter[1]     Appointment start time: 1030  Appointment completion time: 1143  Total time spent with patient (in minutes): 73      Rachele Shelley RN            [1]   Current Outpatient Medications on File Prior to Visit   Medication Sig Dispense Refill    acetaminophen (Tylenol) 500 mg tablet Take 2 tablets (1,000 mg) by mouth every 6 hours.      azelastine (Astelin) 137 mcg (0.1 %) nasal spray Administer 2 sprays into affected nostril(s) once daily. (Patient taking differently: Administer 2 sprays into affected nostril(s) once daily as needed for allergies.)      Biktarvy -25 mg tablet Take 1 tablet by mouth once daily.      blood sugar diagnostic (OneTouch Verio test strips) strip 1 strip once daily. 100 strip 3    blood-glucose meter (OneTouch Verio Flex meter) misc 1 each if needed (Use to test blood sugar as directed). 1 each 0    cephalexin (Keflex) 500 mg capsule  (Patient not taking: Reported on 7/9/2025)      COVID-19 antigen test (BinaxNOW COVID-19 Ag Self Test) kit TEST AS DIRECTED TODAY (Patient not  "taking: Reported on 7/9/2025)      cyanocobalamin, vitamin B-12, 1,000 mcg/15 mL liquid Take 1,000 mcg by mouth once daily.      Dexcom G7 Sensor device Use 1 sensor as directed every 10 days to monitor glucose. If needed, Dexcom's website offers free patches to help keep sensor in place 9 each 3    diphenhydrAMINE (Benadryl Allergy) 25 mg tablet Take 1 tablet (25 mg) by mouth every 6 hours if needed for itching or allergies.      doxycycline (Vibramycin) 50 mg capsule Take one pill by by mouth once daily. Take with at least 8 ounces (large glass) of water, do not lie down for 30 minutes after (Patient not taking: Reported on 7/9/2025) 30 capsule 2    glucose 4 gram chewable tablet Chew 4 tablets (16 g) if needed for low blood sugar - see comments. 40 tablet 11    ibuprofen 800 mg tablet Take 1 tablet (800 mg) by mouth.      insulin degludec (Tresiba FlexTouch U-100) 100 unit/mL (3 mL) injection Inject 40 units once daily 45 mL 3    lancets 30 gauge misc 1 Lancet if needed (Daily as directed to check blood sugar). 100 each 3    magnesium oxide 500 mg magnesium tablet Take 1 tablet (500 mg) by mouth once daily.      metformin HCl (METFORMIN ORAL)  (Patient not taking: Reported on 7/9/2025)      metFORMIN XR (Glucophage-XR) 500 mg 24 hr tablet Take 1 tablet (500 mg) by mouth 2 times a day with meals. Do not crush, chew, or split. (Patient not taking: Reported on 7/9/2025) 180 tablet 3    mupirocin (Bactroban) 2 % ointment Apply to the inside of the nostrils 3 times daily. Pinch shut for 10 seconds. Do this for 10 days. (Patient not taking: Reported on 7/9/2025) 22 g 0    omeprazole (PriLOSEC) 40 mg DR capsule Take 1 capsule (40 mg) by mouth once daily in the morning. Take before meals. 90 capsule 0    pen needle, diabetic 32 gauge x 5/32\" needle Use with insulin injection once daily 100 each 3    potassium citrate 99 mg capsule Take 99 mg by mouth once daily.      semaglutide (Ozempic) 0.25 mg or 0.5 mg (2 mg/3 mL) " pen injector Inject 0.25 mg under the skin every 7 days for 30 days, THEN 0.5 mg every 7 days. 9 mL 3     Current Facility-Administered Medications on File Prior to Visit   Medication Dose Route Frequency Provider Last Rate Last Admin    semaglutide (Ozempic) injection 0.25 mg  0.25 mg subcutaneous Once Hany Sabillon MD

## 2025-07-16 ENCOUNTER — APPOINTMENT (OUTPATIENT)
Dept: NEUROLOGY | Facility: HOSPITAL | Age: 40
End: 2025-07-16
Payer: MEDICARE

## 2025-07-24 ENCOUNTER — SOCIAL WORK (OUTPATIENT)
Dept: BEHAVIORAL HEALTH | Facility: CLINIC | Age: 40
End: 2025-07-24
Payer: MEDICARE

## 2025-07-24 DIAGNOSIS — F43.10 COMPLEX POSTTRAUMATIC STRESS DISORDER: ICD-10-CM

## 2025-07-24 PROCEDURE — 90837 PSYTX W PT 60 MINUTES: CPT | Mod: AJ,95 | Performed by: SOCIAL WORKER

## 2025-07-25 ENCOUNTER — DOCUMENTATION (OUTPATIENT)
Dept: BEHAVIORAL HEALTH | Facility: CLINIC | Age: 40
End: 2025-07-25
Payer: MEDICARE

## 2025-07-25 NOTE — PROGRESS NOTES
Drea Fajardo  Age: 40 y.o.  MRN: [id number]  Date: 7/25/2025  Location of service: phone call    Program Details  Medicaid Community Clinical Case Management  Status: Enrolled  Effective Dates: 7/16/2024 - present  Responsible Staff: Chana Scruggs RD      Goals Reviewed:      Summary:  This writer calls patient to confirm our virtual appointment for today, patient is unable to answer at this time. This writer leaves a voicemail.    Appointment start time: 0830  Appointment completion time: 0831  Total time spent with patient (in minutes): 1  Non-Billable Time: 1  Billable Time Total: 0    Rachele Shelley RN

## 2025-07-29 NOTE — PROGRESS NOTES
Individual Psychotherapy    Time           3:30pm  End             4:30pm     Name:  Drea Fajardo (Katie)      :     1985    Pt joined session via HIPAA-compliant telehealth platform. Pt provided with informed consent.    Referred to therapy by: Dr. Garcia/Rachele Shelley (Clinical Case Management team)  Patient Location: [place] Pt said she was comfortable speaking from this location.  Clinician Location: Clinician located at  [facility]  Chief complaint: Anxiety, hypervigilance  Diagnoses: MDD, CPTSD  Symptoms: Hypervigilance, hyperarousal, difficulty controlling worry, irritability, somatic symptoms, difficulty concentrating  Mental status: Pt presents alert and oriented x3  Functional status: Pt's skills in affect regulation, ability to concentrate are moderately impaired due to mental health symptoms  Treatment modality/frequency: Trauma-informed, DBT-informed - weekly.  Treatment Plan/Recommendations: Pt to engage in DBT-informed therapy - identify specific behavioral targets and create mutual treatment plan in coming sessions. Likely plan for DBT skills training within session.  Progress since last session: Moderate- pt re-engaged in therapy today after getting new insurance. Pt endorses improvement in depressive symptoms since last meeting. Next session - continue assessment and treatment planning.    HIRA Polanco

## 2025-08-01 ENCOUNTER — SOCIAL WORK (OUTPATIENT)
Dept: BEHAVIORAL HEALTH | Facility: CLINIC | Age: 40
End: 2025-08-01
Payer: MEDICARE

## 2025-08-01 DIAGNOSIS — F33.1 MODERATE EPISODE OF RECURRENT MAJOR DEPRESSIVE DISORDER: ICD-10-CM

## 2025-08-01 DIAGNOSIS — F43.10 COMPLEX POSTTRAUMATIC STRESS DISORDER: ICD-10-CM

## 2025-08-01 PROCEDURE — 90837 PSYTX W PT 60 MINUTES: CPT | Mod: AJ,95 | Performed by: SOCIAL WORKER

## 2025-08-01 NOTE — PROGRESS NOTES
"Individual Psychotherapy    Time           10:00am  End             10:55am     Name:  Drea Fajardo (Katie)      :     1985    Pt joined session via HIPAA-compliant telehealth platform. Pt provided with informed consent.    Referred to therapy by: Dr. Garcia/Rachele Shelley (Clinical Case Management team)  Patient Location: Pt [place] session [place]. Pt said she was comfortable speaking from this location.  Clinician Location: Clinician joined from home  Chief complaint: Anxiety, hypervigilance, difficulties maintaining relationships  Diagnoses: MDD, CPTSD  Symptoms: Hypervigilance, hyperarousal, difficulty controlling worry, irritability, somatic symptoms, difficulty concentrating, re-experiencing, mood lability  Mental status: Pt presented overall alert and oriented x3 but was also fatigued and was falling asleep at times during the session. Pt attributed her fatigue to having worked overnight.  Functional status: Pt's skills in affect regulation, ability to concentrate are moderately impaired due to mental health symptoms  Treatment modality/frequency: Trauma-informed, DBT-informed - weekly.  Treatment Plan/Recommendations: Pt to engage in DBT-informed therapy - identify specific behavioral targets and create mutual treatment plan in coming sessions. Likely plan for DBT skills training within session.  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 Began to identify the behaviors that impede pt achieving her goals.    HIRA Polanco      "

## 2025-08-11 ENCOUNTER — SOCIAL WORK (OUTPATIENT)
Dept: BEHAVIORAL HEALTH | Facility: CLINIC | Age: 40
End: 2025-08-11
Payer: MEDICARE

## 2025-08-11 DIAGNOSIS — F43.10 COMPLEX POSTTRAUMATIC STRESS DISORDER: ICD-10-CM

## 2025-08-11 DIAGNOSIS — F33.1 MODERATE EPISODE OF RECURRENT MAJOR DEPRESSIVE DISORDER: ICD-10-CM

## 2025-08-11 PROCEDURE — 90837 PSYTX W PT 60 MINUTES: CPT | Mod: AJ,95 | Performed by: SOCIAL WORKER

## 2025-08-14 ENCOUNTER — APPOINTMENT (OUTPATIENT)
Dept: BEHAVIORAL HEALTH | Facility: CLINIC | Age: 40
End: 2025-08-14
Payer: MEDICARE

## 2025-08-14 ENCOUNTER — PATIENT OUTREACH (OUTPATIENT)
Dept: BEHAVIORAL HEALTH | Facility: CLINIC | Age: 40
End: 2025-08-14

## 2025-08-14 DIAGNOSIS — F17.200 TOBACCO USE DISORDER: ICD-10-CM

## 2025-08-14 DIAGNOSIS — Z79.4 INSULIN-REQUIRING OR DEPENDENT TYPE II DIABETES MELLITUS (MULTI): ICD-10-CM

## 2025-08-14 DIAGNOSIS — F90.2 ATTENTION DEFICIT HYPERACTIVITY DISORDER (ADHD), COMBINED TYPE: ICD-10-CM

## 2025-08-14 DIAGNOSIS — M79.671 FOOT PAIN, RIGHT: ICD-10-CM

## 2025-08-14 DIAGNOSIS — F43.10 COMPLEX POSTTRAUMATIC STRESS DISORDER: ICD-10-CM

## 2025-08-14 DIAGNOSIS — M54.31 SCIATICA OF RIGHT SIDE: ICD-10-CM

## 2025-08-14 DIAGNOSIS — E11.9 INSULIN-REQUIRING OR DEPENDENT TYPE II DIABETES MELLITUS (MULTI): ICD-10-CM

## 2025-08-14 PROCEDURE — 99215 OFFICE O/P EST HI 40 MIN: CPT | Performed by: STUDENT IN AN ORGANIZED HEALTH CARE EDUCATION/TRAINING PROGRAM

## 2025-08-15 ENCOUNTER — PATIENT OUTREACH (OUTPATIENT)
Dept: BEHAVIORAL HEALTH | Facility: CLINIC | Age: 40
End: 2025-08-15
Payer: MEDICARE

## 2025-08-22 ENCOUNTER — SOCIAL WORK (OUTPATIENT)
Dept: BEHAVIORAL HEALTH | Facility: CLINIC | Age: 40
End: 2025-08-22
Payer: MEDICARE

## 2025-08-22 DIAGNOSIS — F43.10 COMPLEX POSTTRAUMATIC STRESS DISORDER: ICD-10-CM

## 2025-08-22 DIAGNOSIS — F33.1 MODERATE EPISODE OF RECURRENT MAJOR DEPRESSIVE DISORDER: ICD-10-CM

## 2025-08-22 PROCEDURE — 90837 PSYTX W PT 60 MINUTES: CPT | Mod: AJ,95 | Performed by: SOCIAL WORKER

## 2025-08-27 ENCOUNTER — APPOINTMENT (OUTPATIENT)
Dept: PODIATRY | Facility: CLINIC | Age: 40
End: 2025-08-27
Payer: MEDICARE

## 2025-08-27 DIAGNOSIS — E11.65 POORLY CONTROLLED TYPE 2 DIABETES MELLITUS (MULTI): Primary | ICD-10-CM

## 2025-08-27 DIAGNOSIS — M79.671 RIGHT FOOT PAIN: ICD-10-CM

## 2025-08-27 DIAGNOSIS — M84.374A STRESS FRACTURE OF RIGHT FOOT, INITIAL ENCOUNTER: ICD-10-CM

## 2025-08-27 PROCEDURE — 3046F HEMOGLOBIN A1C LEVEL >9.0%: CPT | Performed by: PODIATRIST

## 2025-08-27 PROCEDURE — 3060F POS MICROALBUMINURIA REV: CPT | Performed by: PODIATRIST

## 2025-08-27 PROCEDURE — 3050F LDL-C >= 130 MG/DL: CPT | Performed by: PODIATRIST

## 2025-08-27 PROCEDURE — 4004F PT TOBACCO SCREEN RCVD TLK: CPT | Performed by: PODIATRIST

## 2025-08-27 PROCEDURE — 99204 OFFICE O/P NEW MOD 45 MIN: CPT | Performed by: PODIATRIST

## 2025-08-29 ENCOUNTER — APPOINTMENT (OUTPATIENT)
Dept: RADIOLOGY | Facility: HOSPITAL | Age: 40
End: 2025-08-29
Payer: MEDICARE

## 2025-08-29 ENCOUNTER — PHARMACY VISIT (OUTPATIENT)
Dept: PHARMACY | Facility: CLINIC | Age: 40
End: 2025-08-29
Payer: COMMERCIAL

## 2025-08-29 ENCOUNTER — SOCIAL WORK (OUTPATIENT)
Dept: BEHAVIORAL HEALTH | Facility: CLINIC | Age: 40
End: 2025-08-29
Payer: MEDICARE

## 2025-08-29 ENCOUNTER — HOSPITAL ENCOUNTER (EMERGENCY)
Facility: HOSPITAL | Age: 40
Discharge: HOME | End: 2025-08-29
Payer: MEDICARE

## 2025-08-29 DIAGNOSIS — F33.1 MODERATE EPISODE OF RECURRENT MAJOR DEPRESSIVE DISORDER: ICD-10-CM

## 2025-08-29 DIAGNOSIS — F43.10 COMPLEX POSTTRAUMATIC STRESS DISORDER: ICD-10-CM

## 2025-08-29 PROCEDURE — RXMED WILLOW AMBULATORY MEDICATION CHARGE

## 2025-08-29 PROCEDURE — 90837 PSYTX W PT 60 MINUTES: CPT | Mod: AJ,95 | Performed by: SOCIAL WORKER

## 2025-08-29 ASSESSMENT — LIFESTYLE VARIABLES
HAVE YOU EVER FELT YOU SHOULD CUT DOWN ON YOUR DRINKING: NO
TOTAL SCORE: 0
EVER HAD A DRINK FIRST THING IN THE MORNING TO STEADY YOUR NERVES TO GET RID OF A HANGOVER: NO
HAVE PEOPLE ANNOYED YOU BY CRITICIZING YOUR DRINKING: NO
EVER FELT BAD OR GUILTY ABOUT YOUR DRINKING: NO

## 2025-08-29 ASSESSMENT — PAIN DESCRIPTION - ORIENTATION: ORIENTATION: RIGHT

## 2025-08-29 ASSESSMENT — PAIN DESCRIPTION - LOCATION: LOCATION: FOOT

## 2025-08-29 ASSESSMENT — PAIN DESCRIPTION - PAIN TYPE: TYPE: ACUTE PAIN

## 2025-08-29 ASSESSMENT — VISUAL ACUITY: OU: 1

## 2025-08-29 ASSESSMENT — PAIN - FUNCTIONAL ASSESSMENT: PAIN_FUNCTIONAL_ASSESSMENT: 0-10

## 2025-08-29 ASSESSMENT — PAIN SCALES - GENERAL: PAINLEVEL_OUTOF10: 10 - WORST POSSIBLE PAIN

## 2025-09-11 ENCOUNTER — APPOINTMENT (OUTPATIENT)
Dept: BEHAVIORAL HEALTH | Facility: CLINIC | Age: 40
End: 2025-09-11
Payer: MEDICARE

## 2026-09-01 ENCOUNTER — APPOINTMENT (OUTPATIENT)
Dept: PODIATRY | Facility: CLINIC | Age: 41
End: 2026-09-01
Payer: MEDICARE